# Patient Record
Sex: FEMALE | Race: WHITE | NOT HISPANIC OR LATINO | Employment: OTHER | RURAL
[De-identification: names, ages, dates, MRNs, and addresses within clinical notes are randomized per-mention and may not be internally consistent; named-entity substitution may affect disease eponyms.]

---

## 2017-09-20 ENCOUNTER — HISTORICAL (OUTPATIENT)
Dept: ADMINISTRATIVE | Facility: HOSPITAL | Age: 62
End: 2017-09-20

## 2017-09-22 LAB
LAB AP COMMENTS: NORMAL
LAB AP GENERAL CAT - HISTORICAL: NORMAL
LAB AP INTERPRETATION/RESULT - HISTORICAL: NEGATIVE
LAB AP SPECIMEN ADEQUACY - HISTORICAL: NORMAL
LAB AP SPECIMEN SUBMITTED - HISTORICAL: NORMAL

## 2020-02-19 ENCOUNTER — HISTORICAL (OUTPATIENT)
Dept: ADMINISTRATIVE | Facility: HOSPITAL | Age: 65
End: 2020-02-19

## 2020-02-21 LAB
LAB AP GENERAL CAT - HISTORICAL: NORMAL
LAB AP INTERPRETATION/RESULT - HISTORICAL: NEGATIVE
LAB AP SPECIMEN ADEQUACY - HISTORICAL: NORMAL
LAB AP SPECIMEN SUBMITTED - HISTORICAL: NORMAL

## 2020-05-13 ENCOUNTER — HISTORICAL (OUTPATIENT)
Dept: ADMINISTRATIVE | Facility: HOSPITAL | Age: 65
End: 2020-05-13

## 2020-05-19 ENCOUNTER — HISTORICAL (OUTPATIENT)
Dept: ADMINISTRATIVE | Facility: HOSPITAL | Age: 65
End: 2020-05-19

## 2020-05-19 LAB
BILIRUB UR QL STRIP: NEGATIVE MG/DL
BUN SERPL-MCNC: 12 MG/DL (ref 7–18)
CALCIUM SERPL-MCNC: 7.9 MG/DL (ref 8.5–10.1)
CHLORIDE SERPL-SCNC: 104 MMOL/L (ref 98–107)
CLARITY UR: CLEAR
CO2 SERPL-SCNC: 26 MMOL/L (ref 21–32)
COLOR UR: ABNORMAL
CREAT SERPL-MCNC: 0.83 MG/DL (ref 0.55–1.02)
GLUCOSE SERPL-MCNC: 146 MG/DL (ref 74–106)
GLUCOSE UR STRIP-MCNC: NEGATIVE MG/DL
HCT VFR BLD AUTO: 30.5 % (ref 38–47)
HGB BLD-MCNC: 9.8 G/DL (ref 12–16)
KETONES UR STRIP-SCNC: NEGATIVE MG/DL
LEUKOCYTE ESTERASE UR QL STRIP: NEGATIVE LEU/UL
NITRITE UR QL STRIP: NEGATIVE
PH UR STRIP: 6 PH UNITS (ref 5–8)
POTASSIUM SERPL-SCNC: 3.4 MMOL/L (ref 3.5–5.1)
PROT UR QL STRIP: NEGATIVE MG/DL
RBC # UR STRIP: NEGATIVE ERY/UL
SODIUM SERPL-SCNC: 140 MMOL/L (ref 136–145)
SP GR UR STRIP: 1.02 (ref 1–1.03)
UROBILINOGEN UR STRIP-ACNC: 0.2 EU/DL

## 2020-05-20 ENCOUNTER — HISTORICAL (OUTPATIENT)
Dept: ADMINISTRATIVE | Facility: HOSPITAL | Age: 65
End: 2020-05-20

## 2020-05-20 LAB
BASOPHILS # BLD AUTO: 0.01 X10E3/UL (ref 0–0.2)
BASOPHILS NFR BLD AUTO: 0.1 % (ref 0–1)
BUN SERPL-MCNC: 12 MG/DL (ref 7–18)
CALCIUM SERPL-MCNC: 8 MG/DL (ref 8.5–10.1)
CHLORIDE SERPL-SCNC: 103 MMOL/L (ref 98–107)
CO2 SERPL-SCNC: 28 MMOL/L (ref 21–32)
CREAT SERPL-MCNC: 0.98 MG/DL (ref 0.55–1.02)
EOSINOPHIL # BLD AUTO: 0 X10E3/UL (ref 0–0.5)
EOSINOPHIL NFR BLD AUTO: 0 % (ref 1–4)
ERYTHROCYTE [DISTWIDTH] IN BLOOD BY AUTOMATED COUNT: 12.6 % (ref 11.5–14.5)
GLUCOSE SERPL-MCNC: 154 MG/DL (ref 74–106)
HCT VFR BLD AUTO: 30.8 % (ref 38–47)
HGB BLD-MCNC: 9.4 G/DL (ref 12–16)
HYPOCHROMIA BLD QL SMEAR: ABNORMAL
IMM GRANULOCYTES # BLD AUTO: 0.09 X10E3/UL (ref 0–0.04)
IMM GRANULOCYTES NFR BLD: 0.6 % (ref 0–0.4)
LYMPHOCYTES # BLD AUTO: 0.92 X10E3/UL (ref 1–4.8)
LYMPHOCYTES NFR BLD AUTO: 5.9 % (ref 27–41)
LYMPHOCYTES NFR BLD MANUAL: 8 % (ref 27–41)
MCH RBC QN AUTO: 28.7 PG (ref 27–31)
MCHC RBC AUTO-ENTMCNC: 30.5 G/DL (ref 32–36)
MCV RBC AUTO: 94.2 FL (ref 80–96)
MONOCYTES # BLD AUTO: 0.48 X10E3/UL (ref 0–0.8)
MONOCYTES NFR BLD AUTO: 3.1 % (ref 2–6)
MONOCYTES NFR BLD MANUAL: 1 % (ref 2–6)
MPC BLD CALC-MCNC: 11.4 FL (ref 9.4–12.4)
NEUTROPHILS # BLD AUTO: 14.07 X10E3/UL (ref 1.8–7.7)
NEUTROPHILS NFR BLD AUTO: 90.3 % (ref 53–65)
NEUTS BAND NFR BLD MANUAL: 1 % (ref 1–5)
NEUTS SEG NFR BLD MANUAL: 90 % (ref 50–62)
NRBC # BLD AUTO: 0 X10E3/UL (ref 0–0)
NRBC, AUTO (.00): 0 /100 (ref 0–0)
PLATELET # BLD AUTO: 276 X10E3/UL (ref 150–400)
PLATELET MORPHOLOGY: ABNORMAL
POLYCHROMASIA BLD QL SMEAR: ABNORMAL
POTASSIUM SERPL-SCNC: 4.1 MMOL/L (ref 3.5–5.1)
RBC # BLD AUTO: 3.27 X10E6/UL (ref 4.2–5.4)
SODIUM SERPL-SCNC: 139 MMOL/L (ref 136–145)
WBC # BLD AUTO: 15.57 X10E3/UL (ref 4.5–11)

## 2020-12-17 ENCOUNTER — HISTORICAL (OUTPATIENT)
Dept: ADMINISTRATIVE | Facility: HOSPITAL | Age: 65
End: 2020-12-17

## 2021-04-05 ENCOUNTER — TELEPHONE (OUTPATIENT)
Dept: OBSTETRICS AND GYNECOLOGY | Facility: CLINIC | Age: 66
End: 2021-04-05

## 2021-04-13 RX ORDER — LEVOTHYROXINE SODIUM 100 UG/1
100 TABLET ORAL
COMMUNITY

## 2021-04-13 RX ORDER — AZITHROMYCIN 250 MG/1
500 TABLET, FILM COATED ORAL DAILY
COMMUNITY
Start: 2007-08-30 | End: 2021-08-25 | Stop reason: SDUPTHER

## 2021-04-13 RX ORDER — LISINOPRIL 10 MG/1
10 TABLET ORAL DAILY
COMMUNITY
End: 2021-08-25 | Stop reason: CLARIF

## 2021-04-13 RX ORDER — ESTRADIOL 1 MG/1
1 TABLET ORAL DAILY
COMMUNITY
End: 2021-04-14

## 2021-04-13 RX ORDER — METRONIDAZOLE 500 MG/1
500 TABLET ORAL EVERY 12 HOURS
COMMUNITY
Start: 2011-09-20 | End: 2021-08-25 | Stop reason: CLARIF

## 2021-04-13 RX ORDER — OMEPRAZOLE 20 MG/1
20 CAPSULE, DELAYED RELEASE ORAL 2 TIMES DAILY
COMMUNITY
End: 2021-08-25 | Stop reason: CLARIF

## 2021-05-27 ENCOUNTER — OFFICE VISIT (OUTPATIENT)
Dept: OBSTETRICS AND GYNECOLOGY | Facility: CLINIC | Age: 66
End: 2021-05-27
Payer: COMMERCIAL

## 2021-05-27 VITALS
DIASTOLIC BLOOD PRESSURE: 94 MMHG | SYSTOLIC BLOOD PRESSURE: 138 MMHG | HEART RATE: 73 BPM | BODY MASS INDEX: 25.68 KG/M2 | HEIGHT: 69 IN | RESPIRATION RATE: 16 BRPM | WEIGHT: 173.38 LBS | TEMPERATURE: 98 F

## 2021-05-27 DIAGNOSIS — E89.40 SURGICAL MENOPAUSE, ASYMPTOMATIC: ICD-10-CM

## 2021-05-27 DIAGNOSIS — Z01.419 ENCOUNTER FOR ANNUAL ROUTINE GYNECOLOGICAL EXAMINATION: ICD-10-CM

## 2021-05-27 DIAGNOSIS — E03.9 HYPOTHYROIDISM, UNSPECIFIED TYPE: Primary | ICD-10-CM

## 2021-05-27 DIAGNOSIS — K51.00 ULCERATIVE PANCOLITIS WITHOUT COMPLICATION: ICD-10-CM

## 2021-05-27 PROCEDURE — 99396 PREV VISIT EST AGE 40-64: CPT | Mod: ,,, | Performed by: OBSTETRICS & GYNECOLOGY

## 2021-05-27 PROCEDURE — 82270 PR BLOOD OCCULT, BY PEROX, FECES, SINGLE, COLORECT SCRN: ICD-10-PCS | Mod: ,,, | Performed by: OBSTETRICS & GYNECOLOGY

## 2021-05-27 PROCEDURE — 3008F BODY MASS INDEX DOCD: CPT | Mod: ,,, | Performed by: OBSTETRICS & GYNECOLOGY

## 2021-05-27 PROCEDURE — 88142 CYTOPATH C/V THIN LAYER: CPT | Mod: GCY | Performed by: OBSTETRICS & GYNECOLOGY

## 2021-05-27 PROCEDURE — 99396 PR PREVENTIVE VISIT,EST,40-64: ICD-10-PCS | Mod: ,,, | Performed by: OBSTETRICS & GYNECOLOGY

## 2021-05-27 PROCEDURE — 3008F PR BODY MASS INDEX (BMI) DOCUMENTED: ICD-10-PCS | Mod: ,,, | Performed by: OBSTETRICS & GYNECOLOGY

## 2021-05-27 PROCEDURE — 82270 OCCULT BLOOD FECES: CPT | Mod: ,,, | Performed by: OBSTETRICS & GYNECOLOGY

## 2021-05-27 RX ORDER — ROSUVASTATIN CALCIUM 10 MG/1
TABLET, COATED ORAL
COMMUNITY
Start: 2021-02-23

## 2021-05-27 RX ORDER — ESTRADIOL 1 MG/1
1 TABLET ORAL DAILY
Qty: 90 TABLET | Refills: 3 | Status: SHIPPED | OUTPATIENT
Start: 2021-05-27 | End: 2022-10-12

## 2021-05-27 RX ORDER — LISINOPRIL 5 MG/1
5 TABLET ORAL DAILY
COMMUNITY
Start: 2021-03-11

## 2021-05-28 LAB
GH SERPL-MCNC: NORMAL NG/ML
INSULIN SERPL-ACNC: NORMAL U[IU]/ML
LAB AP CLINICAL INFORMATION: NORMAL
LAB AP GYN INTERPRETATION: NEGATIVE
LAB AP PAP DISCLAIMER COMMENTS: NORMAL
RENIN PLAS-CCNC: NORMAL NG/ML/H

## 2021-07-01 ENCOUNTER — PATIENT MESSAGE (OUTPATIENT)
Dept: ADMINISTRATIVE | Facility: OTHER | Age: 66
End: 2021-07-01

## 2021-07-19 ENCOUNTER — HOSPITAL ENCOUNTER (OUTPATIENT)
Dept: RADIOLOGY | Facility: HOSPITAL | Age: 66
Discharge: HOME | End: 2021-07-19
Attending: ORTHOPAEDIC SURGERY
Payer: COMMERCIAL

## 2021-07-19 DIAGNOSIS — Z47.89 ORTHOPEDIC AFTERCARE: ICD-10-CM

## 2021-07-19 PROBLEM — M17.11 PRIMARY OSTEOARTHRITIS OF RIGHT KNEE: Status: ACTIVE | Noted: 2021-07-19

## 2021-07-19 PROCEDURE — 73562 X-RAY EXAM OF KNEE 3: CPT | Mod: TC,50

## 2021-08-23 RX ORDER — MESALAMINE 4 G/60ML
SUSPENSION RECTAL
COMMUNITY
End: 2021-08-25 | Stop reason: CLARIF

## 2021-08-23 RX ORDER — MESALAMINE 800 MG/1
TABLET, DELAYED RELEASE ORAL
COMMUNITY
End: 2021-08-25 | Stop reason: CLARIF

## 2021-08-23 RX ORDER — ALBUTEROL SULFATE 90 UG/1
AEROSOL, METERED RESPIRATORY (INHALATION)
COMMUNITY
Start: 2021-07-07 | End: 2021-08-23

## 2021-08-23 RX ORDER — MELOXICAM 15 MG/1
TABLET ORAL
COMMUNITY
End: 2021-08-25 | Stop reason: CLARIF

## 2021-08-23 RX ORDER — HYDROCODONE BITARTRATE AND ACETAMINOPHEN 7.5; 325 MG/1; MG/1
TABLET ORAL
COMMUNITY
Start: 2021-08-02 | End: 2021-08-25 | Stop reason: CLARIF

## 2021-08-23 RX ORDER — HYDROCORTISONE 100 MG/60ML
SUSPENSION RECTAL
COMMUNITY
End: 2021-08-25 | Stop reason: CLARIF

## 2021-08-23 RX ORDER — TRAMADOL HYDROCHLORIDE 50 MG/1
50 TABLET ORAL 2 TIMES DAILY PRN
COMMUNITY
Start: 2021-07-01 | End: 2021-08-25 | Stop reason: CLARIF

## 2021-08-23 RX ORDER — TRAMADOL HYDROCHLORIDE 50 MG/1
TABLET ORAL
COMMUNITY
End: 2021-08-23

## 2021-08-23 RX ORDER — OMEPRAZOLE 40 MG/1
40 CAPSULE, DELAYED RELEASE ORAL 2 TIMES DAILY
COMMUNITY
Start: 2021-07-01

## 2021-08-23 RX ORDER — OMEPRAZOLE 40 MG/1
CAPSULE, DELAYED RELEASE ORAL
COMMUNITY
End: 2021-08-23

## 2021-08-23 RX ORDER — LISINOPRIL 10 MG/1
TABLET ORAL
COMMUNITY
End: 2021-08-23

## 2021-08-23 RX ORDER — ALBUTEROL SULFATE 90 UG/1
AEROSOL, METERED RESPIRATORY (INHALATION)
COMMUNITY
End: 2021-08-25 | Stop reason: CLARIF

## 2021-08-23 RX ORDER — LEVOTHYROXINE SODIUM 100 UG/1
TABLET ORAL
COMMUNITY
End: 2021-08-23

## 2021-08-23 RX ORDER — CEPHALEXIN 250 MG/1
250 CAPSULE ORAL 2 TIMES DAILY
COMMUNITY
Start: 2021-08-02 | End: 2021-08-25 | Stop reason: CLARIF

## 2021-08-31 ENCOUNTER — ANESTHESIA (OUTPATIENT)
Dept: SURGERY | Facility: HOSPITAL | Age: 66
End: 2021-08-31
Payer: COMMERCIAL

## 2021-08-31 ENCOUNTER — ANESTHESIA EVENT (OUTPATIENT)
Dept: SURGERY | Facility: HOSPITAL | Age: 66
End: 2021-08-31
Payer: COMMERCIAL

## 2021-08-31 ENCOUNTER — HOSPITAL ENCOUNTER (OUTPATIENT)
Facility: HOSPITAL | Age: 66
Discharge: HOME | End: 2021-08-31
Attending: ORTHOPAEDIC SURGERY | Admitting: ORTHOPAEDIC SURGERY
Payer: COMMERCIAL

## 2021-08-31 VITALS
SYSTOLIC BLOOD PRESSURE: 136 MMHG | OXYGEN SATURATION: 96 % | HEART RATE: 75 BPM | RESPIRATION RATE: 16 BRPM | WEIGHT: 164 LBS | DIASTOLIC BLOOD PRESSURE: 80 MMHG | BODY MASS INDEX: 24.22 KG/M2 | TEMPERATURE: 98 F

## 2021-08-31 DIAGNOSIS — M17.11 PRIMARY OSTEOARTHRITIS OF RIGHT KNEE: Primary | ICD-10-CM

## 2021-08-31 LAB
ANION GAP SERPL CALCULATED.3IONS-SCNC: 12 MMOL/L (ref 7–16)
BUN SERPL-MCNC: 8 MG/DL (ref 7–18)
BUN/CREAT SERPL: 11 (ref 6–20)
CALCIUM SERPL-MCNC: 7.9 MG/DL (ref 8.5–10.1)
CHLORIDE SERPL-SCNC: 110 MMOL/L (ref 98–107)
CO2 SERPL-SCNC: 26 MMOL/L (ref 21–32)
CREAT SERPL-MCNC: 0.73 MG/DL (ref 0.55–1.02)
GLUCOSE SERPL-MCNC: 120 MG/DL (ref 74–106)
HCT VFR BLD AUTO: 30.9 % (ref 38–47)
HGB BLD-MCNC: 10 G/DL (ref 12–16)
POTASSIUM SERPL-SCNC: 4.3 MMOL/L (ref 3.5–5.1)
SODIUM SERPL-SCNC: 144 MMOL/L (ref 136–145)

## 2021-08-31 PROCEDURE — 25000003 PHARM REV CODE 250: Performed by: ANESTHESIOLOGY

## 2021-08-31 PROCEDURE — 36000712 HC OR TIME LEV V 1ST 15 MIN: Performed by: ORTHOPAEDIC SURGERY

## 2021-08-31 PROCEDURE — 97165 OT EVAL LOW COMPLEX 30 MIN: CPT

## 2021-08-31 PROCEDURE — 37000008 HC ANESTHESIA 1ST 15 MINUTES: Performed by: ORTHOPAEDIC SURGERY

## 2021-08-31 PROCEDURE — 37000009 HC ANESTHESIA EA ADD 15 MINS: Performed by: ORTHOPAEDIC SURGERY

## 2021-08-31 PROCEDURE — 27000510 HC BLANKET BAIR HUGGER ANY SIZE: Performed by: ANESTHESIOLOGY

## 2021-08-31 PROCEDURE — 36415 COLL VENOUS BLD VENIPUNCTURE: CPT | Performed by: ORTHOPAEDIC SURGERY

## 2021-08-31 PROCEDURE — 71000033 HC RECOVERY, INTIAL HOUR: Performed by: ORTHOPAEDIC SURGERY

## 2021-08-31 PROCEDURE — 71000015 HC POSTOP RECOV 1ST HR: Performed by: ORTHOPAEDIC SURGERY

## 2021-08-31 PROCEDURE — 25000003 PHARM REV CODE 250: Performed by: ORTHOPAEDIC SURGERY

## 2021-08-31 PROCEDURE — 80048 BASIC METABOLIC PNL TOTAL CA: CPT | Performed by: ORTHOPAEDIC SURGERY

## 2021-08-31 PROCEDURE — S0020 INJECTION, BUPIVICAINE HYDRO: HCPCS | Performed by: ANESTHESIOLOGY

## 2021-08-31 PROCEDURE — C1713 ANCHOR/SCREW BN/BN,TIS/BN: HCPCS | Performed by: ORTHOPAEDIC SURGERY

## 2021-08-31 PROCEDURE — 97161 PT EVAL LOW COMPLEX 20 MIN: CPT

## 2021-08-31 PROCEDURE — 71000016 HC POSTOP RECOV ADDL HR: Performed by: ORTHOPAEDIC SURGERY

## 2021-08-31 PROCEDURE — 27100168 OPTIME MED/SURG SUP & DEVICES NON-STERILE SUPPLY: Performed by: ORTHOPAEDIC SURGERY

## 2021-08-31 PROCEDURE — 64447 PR NERVE BLOCK INJ, ANES/STEROID, FEMORAL, INCL IMAG GUIDANCE: ICD-10-PCS | Mod: XU,RT,ICN, | Performed by: ANESTHESIOLOGY

## 2021-08-31 PROCEDURE — C1776 JOINT DEVICE (IMPLANTABLE): HCPCS | Performed by: ORTHOPAEDIC SURGERY

## 2021-08-31 PROCEDURE — 27000716 HC OXISENSOR PROBE, ANY SIZE: Performed by: ANESTHESIOLOGY

## 2021-08-31 PROCEDURE — C1769 GUIDE WIRE: HCPCS | Performed by: ORTHOPAEDIC SURGERY

## 2021-08-31 PROCEDURE — 27201423 OPTIME MED/SURG SUP & DEVICES STERILE SUPPLY: Performed by: ORTHOPAEDIC SURGERY

## 2021-08-31 PROCEDURE — 64447 NJX AA&/STRD FEMORAL NRV IMG: CPT | Mod: XU,RT,ICN, | Performed by: ANESTHESIOLOGY

## 2021-08-31 PROCEDURE — 63600175 PHARM REV CODE 636 W HCPCS: Performed by: ANESTHESIOLOGY

## 2021-08-31 PROCEDURE — D9220A PRA ANESTHESIA: Mod: ICN,,, | Performed by: ANESTHESIOLOGY

## 2021-08-31 PROCEDURE — 27000655: Performed by: ANESTHESIOLOGY

## 2021-08-31 PROCEDURE — 27000177 HC AIRWAY, LARYNGEAL MASK: Performed by: ANESTHESIOLOGY

## 2021-08-31 PROCEDURE — 85018 HEMOGLOBIN: CPT | Performed by: ORTHOPAEDIC SURGERY

## 2021-08-31 PROCEDURE — 85014 HEMATOCRIT: CPT | Performed by: ORTHOPAEDIC SURGERY

## 2021-08-31 PROCEDURE — 25000003 PHARM REV CODE 250: Performed by: NURSE ANESTHETIST, CERTIFIED REGISTERED

## 2021-08-31 PROCEDURE — D9220A PRA ANESTHESIA: ICD-10-PCS | Mod: ICN,,, | Performed by: ANESTHESIOLOGY

## 2021-08-31 PROCEDURE — 27201960 HC SPINAL TRAY: Performed by: ANESTHESIOLOGY

## 2021-08-31 PROCEDURE — 63600175 PHARM REV CODE 636 W HCPCS: Performed by: NURSE ANESTHETIST, CERTIFIED REGISTERED

## 2021-08-31 PROCEDURE — 36000713 HC OR TIME LEV V EA ADD 15 MIN: Performed by: ORTHOPAEDIC SURGERY

## 2021-08-31 DEVICE — IMP COMP TIB TRIATHLON SZ4: Type: IMPLANTABLE DEVICE | Site: KNEE | Status: FUNCTIONAL

## 2021-08-31 DEVICE — CEMENT BONE SMARTSET HV 40G: Type: IMPLANTABLE DEVICE | Site: KNEE | Status: FUNCTIONAL

## 2021-08-31 DEVICE — IMPLANTABLE DEVICE: Type: IMPLANTABLE DEVICE | Site: KNEE | Status: FUNCTIONAL

## 2021-08-31 DEVICE — IMP INSERT TIBIAL BEARING 4X9MM: Type: IMPLANTABLE DEVICE | Site: KNEE | Status: FUNCTIONAL

## 2021-08-31 RX ORDER — OXYCODONE HYDROCHLORIDE 5 MG/1
5 TABLET ORAL
Status: DISCONTINUED | OUTPATIENT
Start: 2021-08-31 | End: 2021-08-31 | Stop reason: HOSPADM

## 2021-08-31 RX ORDER — CEFAZOLIN SODIUM 2 G/50ML
2 SOLUTION INTRAVENOUS
Status: DISCONTINUED | OUTPATIENT
Start: 2021-08-31 | End: 2021-08-31 | Stop reason: HOSPADM

## 2021-08-31 RX ORDER — KETOROLAC TROMETHAMINE 30 MG/ML
INJECTION, SOLUTION INTRAMUSCULAR; INTRAVENOUS
Status: DISCONTINUED | OUTPATIENT
Start: 2021-08-31 | End: 2021-08-31

## 2021-08-31 RX ORDER — FENTANYL CITRATE 50 UG/ML
INJECTION, SOLUTION INTRAMUSCULAR; INTRAVENOUS
Status: DISCONTINUED | OUTPATIENT
Start: 2021-08-31 | End: 2021-08-31

## 2021-08-31 RX ORDER — LIDOCAINE HYDROCHLORIDE 20 MG/ML
INJECTION INTRAVENOUS
Status: DISCONTINUED | OUTPATIENT
Start: 2021-08-31 | End: 2021-08-31

## 2021-08-31 RX ORDER — EPHEDRINE SULFATE 50 MG/ML
INJECTION, SOLUTION INTRAVENOUS
Status: DISCONTINUED | OUTPATIENT
Start: 2021-08-31 | End: 2021-08-31

## 2021-08-31 RX ORDER — CEFAZOLIN SODIUM 1 G/3ML
INJECTION, POWDER, FOR SOLUTION INTRAMUSCULAR; INTRAVENOUS
Status: DISCONTINUED | OUTPATIENT
Start: 2021-08-31 | End: 2021-08-31

## 2021-08-31 RX ORDER — ONDANSETRON 2 MG/ML
4 INJECTION INTRAMUSCULAR; INTRAVENOUS DAILY PRN
Status: DISCONTINUED | OUTPATIENT
Start: 2021-08-31 | End: 2021-08-31 | Stop reason: HOSPADM

## 2021-08-31 RX ORDER — MORPHINE SULFATE 10 MG/ML
4 INJECTION INTRAMUSCULAR; INTRAVENOUS; SUBCUTANEOUS EVERY 5 MIN PRN
Status: DISCONTINUED | OUTPATIENT
Start: 2021-08-31 | End: 2021-08-31 | Stop reason: HOSPADM

## 2021-08-31 RX ORDER — TRANEXAMIC ACID 100 MG/ML
INJECTION, SOLUTION INTRAVENOUS
Status: DISCONTINUED | OUTPATIENT
Start: 2021-08-31 | End: 2021-08-31

## 2021-08-31 RX ORDER — HYDROCODONE BITARTRATE AND ACETAMINOPHEN 10; 325 MG/1; MG/1
1 TABLET ORAL EVERY 4 HOURS PRN
Qty: 30 TABLET | Refills: 0 | Status: SHIPPED | OUTPATIENT
Start: 2021-08-31 | End: 2021-09-08 | Stop reason: DRUGHIGH

## 2021-08-31 RX ORDER — SODIUM CHLORIDE 9 MG/ML
INJECTION, SOLUTION INTRAVENOUS CONTINUOUS
Status: DISCONTINUED | OUTPATIENT
Start: 2021-08-31 | End: 2021-08-31 | Stop reason: HOSPADM

## 2021-08-31 RX ORDER — SCOLOPAMINE TRANSDERMAL SYSTEM 1 MG/1
1 PATCH, EXTENDED RELEASE TRANSDERMAL
Status: DISCONTINUED | OUTPATIENT
Start: 2021-08-31 | End: 2021-08-31 | Stop reason: HOSPADM

## 2021-08-31 RX ORDER — HYDROMORPHONE HYDROCHLORIDE 2 MG/ML
0.5 INJECTION, SOLUTION INTRAMUSCULAR; INTRAVENOUS; SUBCUTANEOUS EVERY 5 MIN PRN
Status: DISCONTINUED | OUTPATIENT
Start: 2021-08-31 | End: 2021-08-31 | Stop reason: HOSPADM

## 2021-08-31 RX ORDER — MEPERIDINE HYDROCHLORIDE 25 MG/ML
25 INJECTION INTRAMUSCULAR; INTRAVENOUS; SUBCUTANEOUS EVERY 10 MIN PRN
Status: DISCONTINUED | OUTPATIENT
Start: 2021-08-31 | End: 2021-08-31 | Stop reason: HOSPADM

## 2021-08-31 RX ORDER — DIPHENHYDRAMINE HYDROCHLORIDE 50 MG/ML
25 INJECTION INTRAMUSCULAR; INTRAVENOUS EVERY 6 HOURS PRN
Status: DISCONTINUED | OUTPATIENT
Start: 2021-08-31 | End: 2021-08-31 | Stop reason: HOSPADM

## 2021-08-31 RX ORDER — MIDAZOLAM HYDROCHLORIDE 1 MG/ML
INJECTION INTRAMUSCULAR; INTRAVENOUS
Status: DISCONTINUED | OUTPATIENT
Start: 2021-08-31 | End: 2021-08-31

## 2021-08-31 RX ORDER — PROPOFOL 10 MG/ML
VIAL (ML) INTRAVENOUS
Status: DISCONTINUED | OUTPATIENT
Start: 2021-08-31 | End: 2021-08-31

## 2021-08-31 RX ADMIN — EPHEDRINE SULFATE 10 MG: 50 INJECTION INTRAVENOUS at 09:08

## 2021-08-31 RX ADMIN — SODIUM CHLORIDE: 9 INJECTION, SOLUTION INTRAVENOUS at 06:08

## 2021-08-31 RX ADMIN — FENTANYL CITRATE 50 MCG: 50 INJECTION INTRAMUSCULAR; INTRAVENOUS at 08:08

## 2021-08-31 RX ADMIN — PROPOFOL 50 MG: 10 INJECTION, EMULSION INTRAVENOUS at 08:08

## 2021-08-31 RX ADMIN — EPHEDRINE SULFATE 10 MG: 50 INJECTION INTRAVENOUS at 08:08

## 2021-08-31 RX ADMIN — BUPIVACAINE HYDROCHLORIDE 1.4 ML: 7.5 INJECTION, SOLUTION EPIDURAL; RETROBULBAR at 08:08

## 2021-08-31 RX ADMIN — MIDAZOLAM HYDROCHLORIDE 2 MG: 1 INJECTION, SOLUTION INTRAMUSCULAR; INTRAVENOUS at 08:08

## 2021-08-31 RX ADMIN — ROPIVACAINE HYDROCHLORIDE 20 ML: 7.5 INJECTION, SOLUTION EPIDURAL; PERINEURAL at 08:08

## 2021-08-31 RX ADMIN — CEFAZOLIN 2 G: 1 INJECTION, POWDER, FOR SOLUTION INTRAMUSCULAR; INTRAVENOUS; PARENTERAL at 08:08

## 2021-08-31 RX ADMIN — DEXAMETHASONE SODIUM PHOSPHATE 10 MG: 10 INJECTION, SOLUTION INTRAMUSCULAR; INTRAVENOUS at 08:08

## 2021-08-31 RX ADMIN — SODIUM CHLORIDE: 9 INJECTION, SOLUTION INTRAVENOUS at 08:08

## 2021-08-31 RX ADMIN — PROPOFOL 125 MG: 10 INJECTION, EMULSION INTRAVENOUS at 08:08

## 2021-08-31 RX ADMIN — SCOPALAMINE 1 PATCH: 1 PATCH, EXTENDED RELEASE TRANSDERMAL at 07:08

## 2021-08-31 RX ADMIN — TRANEXAMIC ACID 1000 MG: 100 INJECTION, SOLUTION INTRAVENOUS at 08:08

## 2021-08-31 RX ADMIN — TRANEXAMIC ACID 1000 MG: 100 INJECTION, SOLUTION INTRAVENOUS at 09:08

## 2021-08-31 RX ADMIN — LIDOCAINE HYDROCHLORIDE 50 MG: 20 INJECTION, SOLUTION INTRAVENOUS at 08:08

## 2021-08-31 RX ADMIN — KETOROLAC TROMETHAMINE 30 MG: 30 INJECTION, SOLUTION INTRAMUSCULAR at 09:08

## 2021-08-31 RX ADMIN — VANCOMYCIN HYDROCHLORIDE 1000 MG: 1 INJECTION, POWDER, LYOPHILIZED, FOR SOLUTION INTRAVENOUS at 08:08

## 2021-09-03 RX ORDER — ROPIVACAINE HYDROCHLORIDE 7.5 MG/ML
INJECTION, SOLUTION EPIDURAL; PERINEURAL
Status: DISCONTINUED | OUTPATIENT
Start: 2021-08-31 | End: 2021-09-03

## 2021-09-03 RX ORDER — DEXAMETHASONE SODIUM PHOSPHATE 10 MG/ML
INJECTION INTRAMUSCULAR; INTRAVENOUS
Status: DISCONTINUED | OUTPATIENT
Start: 2021-08-31 | End: 2021-09-03

## 2021-09-03 RX ORDER — BUPIVACAINE HYDROCHLORIDE 7.5 MG/ML
INJECTION, SOLUTION EPIDURAL; RETROBULBAR
Status: DISCONTINUED | OUTPATIENT
Start: 2021-08-31 | End: 2021-09-03

## 2021-09-08 DIAGNOSIS — Z96.651 STATUS POST TOTAL RIGHT KNEE REPLACEMENT: Primary | ICD-10-CM

## 2021-09-08 RX ORDER — HYDROCODONE BITARTRATE AND ACETAMINOPHEN 10; 325 MG/1; MG/1
1 TABLET ORAL EVERY 6 HOURS PRN
Qty: 24 TABLET | Refills: 0 | Status: SHIPPED | OUTPATIENT
Start: 2021-09-08 | End: 2021-09-15

## 2021-09-15 PROBLEM — Z09 POSTOP CHECK: Status: ACTIVE | Noted: 2021-09-15

## 2021-10-13 ENCOUNTER — HOSPITAL ENCOUNTER (OUTPATIENT)
Dept: RADIOLOGY | Facility: HOSPITAL | Age: 66
Discharge: HOME | End: 2021-10-13
Attending: ORTHOPAEDIC SURGERY
Payer: COMMERCIAL

## 2021-10-13 DIAGNOSIS — Z47.89 ORTHOPEDIC AFTERCARE: ICD-10-CM

## 2021-10-13 PROBLEM — Z96.651 HISTORY OF TOTAL KNEE ARTHROPLASTY, RIGHT: Status: ACTIVE | Noted: 2021-10-13

## 2021-10-13 PROCEDURE — 73562 X-RAY EXAM OF KNEE 3: CPT | Mod: TC,RT

## 2021-12-03 ENCOUNTER — HOSPITAL ENCOUNTER (OUTPATIENT)
Dept: RADIOLOGY | Facility: HOSPITAL | Age: 66
Discharge: HOME | End: 2021-12-03
Attending: ORTHOPAEDIC SURGERY
Payer: MEDICARE

## 2021-12-03 DIAGNOSIS — Z47.89 ORTHOPEDIC AFTERCARE: ICD-10-CM

## 2021-12-03 PROCEDURE — 73562 X-RAY EXAM OF KNEE 3: CPT | Mod: TC,RT

## 2021-12-20 PROBLEM — Z09 POSTOP CHECK: Status: RESOLVED | Noted: 2021-09-15 | Resolved: 2021-12-20

## 2021-12-21 ENCOUNTER — OFFICE VISIT (OUTPATIENT)
Dept: GASTROENTEROLOGY | Facility: CLINIC | Age: 66
End: 2021-12-21
Payer: MEDICARE

## 2021-12-21 VITALS
HEART RATE: 85 BPM | HEIGHT: 68 IN | BODY MASS INDEX: 24.25 KG/M2 | SYSTOLIC BLOOD PRESSURE: 134 MMHG | DIASTOLIC BLOOD PRESSURE: 76 MMHG | WEIGHT: 160 LBS | RESPIRATION RATE: 14 BRPM | OXYGEN SATURATION: 99 %

## 2021-12-21 DIAGNOSIS — K92.1 BLOOD IN STOOL: ICD-10-CM

## 2021-12-21 DIAGNOSIS — K51.311 ULCERATIVE RECTOSIGMOIDITIS WITH RECTAL BLEEDING: Primary | ICD-10-CM

## 2021-12-21 DIAGNOSIS — D64.9 ANEMIA, UNSPECIFIED TYPE: ICD-10-CM

## 2021-12-21 DIAGNOSIS — R19.7 DIARRHEA, UNSPECIFIED TYPE: ICD-10-CM

## 2021-12-21 LAB
C COLI+JEJ+UPSA DNA STL QL NAA+NON-PROBE: NEGATIVE
E COLI SXT1 STL QL IA: NEGATIVE
E COLI SXT2 STL QL IA: NEGATIVE
NOROVIRUS GI+II RNA STL QL NAA+NON-PROBE: NEGATIVE
RVA RNA STL QL NAA+NON-PROBE: NEGATIVE
S ENT+BONG DNA STL QL NAA+NON-PROBE: NEGATIVE
SHIGELLA SPECIES NAT: NEGATIVE
V CHOL+PARA+VUL DNA STL QL NAA+NON-PROBE: NEGATIVE
Y ENTEROCOL DNA STL QL NAA+NON-PROBE: NEGATIVE

## 2021-12-21 PROCEDURE — 87506 ENTERIC PATHOGEN PANEL: ICD-10-PCS | Mod: ,,, | Performed by: CLINICAL MEDICAL LABORATORY

## 2021-12-21 PROCEDURE — 99204 OFFICE O/P NEW MOD 45 MIN: CPT | Mod: ,,, | Performed by: NURSE PRACTITIONER

## 2021-12-21 PROCEDURE — 83993 CALPROTECTIN, STOOL: ICD-10-PCS | Mod: 90,,, | Performed by: CLINICAL MEDICAL LABORATORY

## 2021-12-21 PROCEDURE — 87493 C DIFF AMPLIFIED PROBE: CPT | Mod: 59,,, | Performed by: CLINICAL MEDICAL LABORATORY

## 2021-12-21 PROCEDURE — 85610 PROTHROMBIN TIME: CPT | Performed by: NURSE PRACTITIONER

## 2021-12-21 PROCEDURE — 87506 IADNA-DNA/RNA PROBE TQ 6-11: CPT | Mod: ,,, | Performed by: CLINICAL MEDICAL LABORATORY

## 2021-12-21 PROCEDURE — 99204 PR OFFICE/OUTPT VISIT, NEW, LEVL IV, 45-59 MIN: ICD-10-PCS | Mod: ,,, | Performed by: NURSE PRACTITIONER

## 2021-12-21 PROCEDURE — 87493 C DIFF TOXIN BY PCR: ICD-10-PCS | Mod: 59,,, | Performed by: CLINICAL MEDICAL LABORATORY

## 2021-12-21 PROCEDURE — 83993 ASSAY FOR CALPROTECTIN FECAL: CPT | Mod: 90,,, | Performed by: CLINICAL MEDICAL LABORATORY

## 2021-12-22 LAB — C DIFF TOX A+B STL IA-ACNC: NEGATIVE

## 2021-12-24 LAB — CALPROTECTIN STL-MCNT: 1543 MCG/G

## 2022-01-05 ENCOUNTER — TELEPHONE (OUTPATIENT)
Dept: GASTROENTEROLOGY | Facility: CLINIC | Age: 67
End: 2022-01-05
Payer: MEDICARE

## 2022-01-05 ENCOUNTER — PATIENT MESSAGE (OUTPATIENT)
Dept: GASTROENTEROLOGY | Facility: CLINIC | Age: 67
End: 2022-01-05
Payer: MEDICARE

## 2022-01-05 NOTE — TELEPHONE ENCOUNTER
After talking with AB, will call in steriods for pt considering symptoms, will call in to pharmacy. [facility] at [facility] working on getting zeposia approved. Pt notified and voiced understanding.

## 2022-02-23 ENCOUNTER — TELEPHONE (OUTPATIENT)
Dept: GASTROENTEROLOGY | Facility: CLINIC | Age: 67
End: 2022-02-23
Payer: MEDICARE

## 2022-02-23 DIAGNOSIS — K51.819 OTHER ULCERATIVE COLITIS WITH COMPLICATION: Primary | ICD-10-CM

## 2022-02-23 NOTE — TELEPHONE ENCOUNTER
Pt calls and states zepozia is getting delivered any day now. ekg is ordered and pt can go any day from 9am-3pm. Pt voiced understanding.

## 2022-02-25 ENCOUNTER — HOSPITAL ENCOUNTER (OUTPATIENT)
Dept: CARDIOLOGY | Facility: HOSPITAL | Age: 67
Discharge: HOME | End: 2022-02-25
Attending: NURSE PRACTITIONER
Payer: MEDICARE

## 2022-02-25 DIAGNOSIS — K51.819 OTHER ULCERATIVE COLITIS WITH COMPLICATION: ICD-10-CM

## 2022-02-25 PROCEDURE — 93005 ELECTROCARDIOGRAM TRACING: CPT

## 2022-02-25 PROCEDURE — 93010 ELECTROCARDIOGRAM REPORT: CPT | Mod: ,,, | Performed by: INTERNAL MEDICINE

## 2022-02-25 PROCEDURE — 93010 EKG 12-LEAD: ICD-10-PCS | Mod: ,,, | Performed by: INTERNAL MEDICINE

## 2022-03-03 ENCOUNTER — HOSPITAL ENCOUNTER (OUTPATIENT)
Dept: RADIOLOGY | Facility: HOSPITAL | Age: 67
Discharge: HOME | End: 2022-03-03
Attending: NURSE PRACTITIONER
Payer: MEDICARE

## 2022-03-03 DIAGNOSIS — Z96.651 STATUS POST TOTAL RIGHT KNEE REPLACEMENT: ICD-10-CM

## 2022-03-03 PROCEDURE — 73562 X-RAY EXAM OF KNEE 3: CPT | Mod: TC,RT

## 2022-03-03 PROCEDURE — 73562 XR KNEE AP LAT WITH SUNRISE RIGHT: ICD-10-PCS | Mod: 26,RT,, | Performed by: STUDENT IN AN ORGANIZED HEALTH CARE EDUCATION/TRAINING PROGRAM

## 2022-03-03 PROCEDURE — 73562 X-RAY EXAM OF KNEE 3: CPT | Mod: 26,RT,, | Performed by: STUDENT IN AN ORGANIZED HEALTH CARE EDUCATION/TRAINING PROGRAM

## 2022-03-21 ENCOUNTER — OFFICE VISIT (OUTPATIENT)
Dept: GASTROENTEROLOGY | Facility: CLINIC | Age: 67
End: 2022-03-21
Payer: MEDICARE

## 2022-03-21 VITALS
HEIGHT: 69 IN | BODY MASS INDEX: 24.44 KG/M2 | SYSTOLIC BLOOD PRESSURE: 154 MMHG | WEIGHT: 165 LBS | RESPIRATION RATE: 14 BRPM | OXYGEN SATURATION: 97 % | HEART RATE: 71 BPM | DIASTOLIC BLOOD PRESSURE: 89 MMHG

## 2022-03-21 DIAGNOSIS — Z86.2 HISTORY OF IRON DEFICIENCY ANEMIA: ICD-10-CM

## 2022-03-21 DIAGNOSIS — K51.311 ULCERATIVE RECTOSIGMOIDITIS WITH RECTAL BLEEDING: Primary | ICD-10-CM

## 2022-03-21 DIAGNOSIS — M25.473 ANKLE SWELLING, UNSPECIFIED LATERALITY: ICD-10-CM

## 2022-03-21 PROCEDURE — 99213 PR OFFICE/OUTPT VISIT, EST, LEVL III, 20-29 MIN: ICD-10-PCS | Mod: ,,, | Performed by: NURSE PRACTITIONER

## 2022-03-21 PROCEDURE — 99213 OFFICE O/P EST LOW 20 MIN: CPT | Mod: ,,, | Performed by: NURSE PRACTITIONER

## 2022-03-21 NOTE — PROGRESS NOTES
Pt here for UC f/u. Doing well after high dose tapering steroids for flare. No further abd pain, rectal bleeding, or diarrhea. She is on week 3 of Zeposia. No cardiac or CVA hx per pt or chart. Had NSR EKG (spoke with heart station, who states cardiologist on call read the EKG when it was done- scanned under card proc tab) & CBC, LFTs prior to tx starting. No macular edema, uveitis, or DM hx. C/o ankle swelling after starting Zeposia but spontaneously resolved within about a week or two. Otherwise, doing great with no symptoms of IBD currently or other side effects.  Previous hx of failed mesalamine NV/enemas & Asacol PO when dx about 3 yrs ago @ [facility].   Has not had colonoscopy since 2019 - full colon 2019 and flex sig 2020 - by Tony (full reports scanned in media tab).    Past Medical History:   Diagnosis Date    Bacterial vaginosis     Cystocele with rectocele 11/28/2007    1st degree cystocele; 2nd degree rectocele    Cystocele, midline 12/12/2007    midline    Depressive disorder 09/2004    mild    Dysmenorrhea 2006    severe first day and getting worse    Esophageal reflux     Granulation tissue 08/12/2008    5 - 8 mm long lower post repair granulation tissue - treated with sliver nitrate cautery    Hypercholesterolemia 09/20/2004    cholesterol is 219 mg/dl    Hyperlipidemia 10/26/2011    mile;  TG  71 mg/dl;  HDL  67 mg/dl;  LDL  136 mg/dl;  total cholesterol  217 mg/dl.  10/13/2017:  LDL  163 mg/dl;  total cholesterol  260 mg/dl; triglycerides  186 mg/dl;  HDL  66 mg/dl    Hypertension     Hypertriglyceridemia 10/04/2010    216 mg/dl;  [id number]    Hypertriglyceridemia 10/04/2010    216  mg/dll  02/01/2013 - recent kevek us 238 mg/dl    IBD (inflammatory bowel disease)     Large uterus     Menorrhagia     Polymenorrhea     PONV (postoperative nausea and vomiting)     Post-hysterectomy menopause     Rectocele     Stress incontinence     Ulcerative colitis 08/2020    Ulcerative  colitis confirmed by colonoscopy by Dr. Ho Capellan    Uterine prolapse     Vaginal discharge        Review of Systems   Constitutional: Negative for chills and fever.   Respiratory: Negative for shortness of breath.    Cardiovascular: Negative for chest pain.   Gastrointestinal: Negative for abdominal pain, blood in stool, constipation, diarrhea, melena, nausea and vomiting.   Skin: Negative for rash.   Neurological: Negative for weakness.       Physical Exam  Vitals reviewed. Exam conducted with a chaperone present.   Constitutional:       General: She is not in acute distress.     Appearance: Normal appearance.   HENT:      Head: Normocephalic.   Eyes:      General: No scleral icterus.     Pupils: Pupils are equal, round, and reactive to light.   Cardiovascular:      Rate and Rhythm: Normal rate.   Pulmonary:      Effort: Pulmonary effort is normal.   Abdominal:      General: There is no distension.      Palpations: Abdomen is soft.      Tenderness: There is no abdominal tenderness. There is no guarding or rebound.   Skin:     General: Skin is warm and dry.   Neurological:      General: No focal deficit present.      Mental Status: She is alert and oriented to person, place, and time. Mental status is at baseline.   Psychiatric:         Mood and Affect: Mood normal.         Behavior: Behavior normal.         Thought Content: Thought content normal.         Judgment: Judgment normal.       Plan  -continue Zeposia as directed  -avoid high tyramine foods  -reports any side effects immediately especially any cardiac, infectious, ophthalmic, or hepatic related symptoms  -recheck CBC/CMP at follow up  -repeat colonoscopy in 6 months for surveillance  -recheck inflammatory markers for comparison (was flaring at last check, now not having symptoms)  -echo for h/o ankle swelling after starting Zeposia  -consider ophthalmology follow up if not already established with one for screenings  -RTC 3 months, sooner PRN

## 2022-03-30 ENCOUNTER — HOSPITAL ENCOUNTER (OUTPATIENT)
Dept: CARDIOLOGY | Facility: HOSPITAL | Age: 67
Discharge: HOME | End: 2022-03-30
Attending: NURSE PRACTITIONER
Payer: MEDICARE

## 2022-03-30 VITALS — BODY MASS INDEX: 24.44 KG/M2 | WEIGHT: 165 LBS | HEIGHT: 69 IN

## 2022-03-30 DIAGNOSIS — M25.473 ANKLE SWELLING, UNSPECIFIED LATERALITY: ICD-10-CM

## 2022-03-30 LAB
AV INDEX (PROSTH): 0.67
AV MEAN GRADIENT: 4 MMHG
AV PEAK GRADIENT: 7 MMHG
AV VALVE AREA: 2.48 CM2
AV VELOCITY RATIO: 0.69
BSA FOR ECHO PROCEDURE: 1.91 M2
CV ECHO LV RWT: 0.39 CM
DOP CALC AO PEAK VEL: 1.3 M/S
DOP CALC AO VTI: 30.78 CM
DOP CALC LVOT AREA: 3.7 CM2
DOP CALC LVOT DIAMETER: 2.17 CM
DOP CALC LVOT PEAK VEL: 0.9 M/S
DOP CALC LVOT STROKE VOLUME: 76.48 CM3
DOP CALCLVOT PEAK VEL VTI: 20.69 CM
E WAVE DECELERATION TIME: 271 MSEC
ECHO EF ESTIMATED: 66 %
ECHO LV POSTERIOR WALL: 0.82 CM (ref 0.6–1.1)
EJECTION FRACTION: 60 %
FRACTIONAL SHORTENING: 36 % (ref 28–44)
INTERVENTRICULAR SEPTUM: 1.59 CM (ref 0.6–1.1)
LEFT ATRIUM SIZE: 3.2 CM
LEFT INTERNAL DIMENSION IN SYSTOLE: 2.68 CM (ref 2.1–4)
LEFT VENTRICLE DIASTOLIC VOLUME INDEX: 40.89 ML/M2
LEFT VENTRICLE DIASTOLIC VOLUME: 77.7 ML
LEFT VENTRICLE MASS INDEX: 94 G/M2
LEFT VENTRICLE SYSTOLIC VOLUME INDEX: 14.1 ML/M2
LEFT VENTRICLE SYSTOLIC VOLUME: 26.8 ML
LEFT VENTRICULAR INTERNAL DIMENSION IN DIASTOLE: 4.18 CM (ref 3.5–6)
LEFT VENTRICULAR MASS: 177.95 G
LVOT MG: 1.8 MMHG
MV PEAK E VEL: 0.87 M/S
PISA TR MAX VEL: 2.44 M/S
RIGHT ATRIAL AREA: 8.2 CM2
RIGHT VENTRICULAR END-DIASTOLIC DIMENSION: 2.29 CM
TR MAX PG: 24 MMHG
TRICUSPID ANNULAR PLANE SYSTOLIC EXCURSION: 2.3 CM

## 2022-03-30 PROCEDURE — 93306 ECHO (CUPID ONLY): ICD-10-PCS | Mod: 26,,, | Performed by: INTERNAL MEDICINE

## 2022-03-30 PROCEDURE — 93306 TTE W/DOPPLER COMPLETE: CPT | Mod: 26,,, | Performed by: INTERNAL MEDICINE

## 2022-03-30 PROCEDURE — 93306 TTE W/DOPPLER COMPLETE: CPT

## 2022-06-06 ENCOUNTER — HOSPITAL ENCOUNTER (OUTPATIENT)
Dept: RADIOLOGY | Facility: HOSPITAL | Age: 67
Discharge: HOME | End: 2022-06-06
Attending: ORTHOPAEDIC SURGERY
Payer: MEDICARE

## 2022-06-06 DIAGNOSIS — M25.552 LEFT HIP PAIN: ICD-10-CM

## 2022-06-06 PROBLEM — M70.62 GREATER TROCHANTERIC BURSITIS OF LEFT HIP: Status: ACTIVE | Noted: 2022-06-06

## 2022-06-06 PROCEDURE — 73502 X-RAY EXAM HIP UNI 2-3 VIEWS: CPT | Mod: TC,LT

## 2022-06-13 ENCOUNTER — TELEPHONE (OUTPATIENT)
Dept: GASTROENTEROLOGY | Facility: CLINIC | Age: 67
End: 2022-06-13
Payer: MEDICARE

## 2022-06-13 NOTE — TELEPHONE ENCOUNTER
Pt calls and states she stopped taking zeposia at the end of April, it was causing tachycardia with a resting heart rate of 150 at times and also terrible ankle swelling. Pt states within the past week, her mom passed and she is now in a bad flare, bleeding and cramping. Pt is out of town but called in tapering of prednisone to [facility] in [place], [place] (phone: [phone]). Pt notified and voiced understanding.

## 2022-06-21 ENCOUNTER — OFFICE VISIT (OUTPATIENT)
Dept: GASTROENTEROLOGY | Facility: CLINIC | Age: 67
End: 2022-06-21
Payer: MEDICARE

## 2022-06-21 VITALS
SYSTOLIC BLOOD PRESSURE: 150 MMHG | HEIGHT: 68 IN | HEART RATE: 81 BPM | DIASTOLIC BLOOD PRESSURE: 86 MMHG | BODY MASS INDEX: 25.46 KG/M2 | OXYGEN SATURATION: 97 % | WEIGHT: 168 LBS

## 2022-06-21 DIAGNOSIS — R19.7 DIARRHEA, UNSPECIFIED TYPE: ICD-10-CM

## 2022-06-21 DIAGNOSIS — K51.911 ULCERATIVE COLITIS WITH RECTAL BLEEDING, UNSPECIFIED LOCATION: Primary | ICD-10-CM

## 2022-06-21 PROCEDURE — 87177 OVA AND PARASITE EXAM: ICD-10-PCS | Mod: ,,, | Performed by: CLINICAL MEDICAL LABORATORY

## 2022-06-21 PROCEDURE — 99214 OFFICE O/P EST MOD 30 MIN: CPT | Mod: ,,, | Performed by: NURSE PRACTITIONER

## 2022-06-21 PROCEDURE — 99214 PR OFFICE/OUTPT VISIT, EST, LEVL IV, 30-39 MIN: ICD-10-PCS | Mod: ,,, | Performed by: NURSE PRACTITIONER

## 2022-06-21 PROCEDURE — 87506 IADNA-DNA/RNA PROBE TQ 6-11: CPT | Mod: ,,, | Performed by: CLINICAL MEDICAL LABORATORY

## 2022-06-21 PROCEDURE — 87177 OVA AND PARASITES SMEARS: CPT | Mod: ,,, | Performed by: CLINICAL MEDICAL LABORATORY

## 2022-06-21 PROCEDURE — 87209 OVA AND PARASITE EXAM: ICD-10-PCS | Mod: ,,, | Performed by: CLINICAL MEDICAL LABORATORY

## 2022-06-21 PROCEDURE — 87209 SMEAR COMPLEX STAIN: CPT | Mod: ,,, | Performed by: CLINICAL MEDICAL LABORATORY

## 2022-06-21 PROCEDURE — 87493 C DIFF AMPLIFIED PROBE: CPT | Mod: 59,,, | Performed by: CLINICAL MEDICAL LABORATORY

## 2022-06-21 PROCEDURE — 87506 ENTERIC PATHOGEN PANEL: ICD-10-PCS | Mod: ,,, | Performed by: CLINICAL MEDICAL LABORATORY

## 2022-06-21 PROCEDURE — 87493 C DIFF TOXIN BY PCR: ICD-10-PCS | Mod: 59,,, | Performed by: CLINICAL MEDICAL LABORATORY

## 2022-06-21 RX ORDER — PREDNISONE 10 MG/1
TABLET ORAL
COMMUNITY
Start: 2022-06-13 | End: 2022-10-12

## 2022-06-21 NOTE — PROGRESS NOTES
Pt is a 67 y/o WF here for UC follow up. Zeposia started March 1, stopped April 15 due to ankle swelling, tachycardia up to 160 bpm. She called office last week while out of town with c/o having bloody diarrhea, abd pain. We called in tapering dose of steroids starting at 50 mg. She has started these.   Previous hx of failed mesalamine ME/enemas & Asacol PO when dx about 3 yrs ago @ [facility]. No prior biologics other than Zeposia.   Has not had colonoscopy since 2019 - full colon 2019 and flex sig 2020 - by Tony (full reports scanned in media tab).    Past Medical History:   Diagnosis Date    Bacterial vaginosis     Cystocele with rectocele 11/28/2007    1st degree cystocele; 2nd degree rectocele    Cystocele, midline 12/12/2007    midline    Depressive disorder 09/2004    mild    Dysmenorrhea 2006    severe first day and getting worse    Esophageal reflux     Granulation tissue 08/12/2008    5 - 8 mm long lower post repair granulation tissue - treated with sliver nitrate cautery    Hypercholesterolemia 09/20/2004    cholesterol is 219 mg/dl    Hyperlipidemia 10/26/2011    mile;  TG  71 mg/dl;  HDL  67 mg/dl;  LDL  136 mg/dl;  total cholesterol  217 mg/dl.  10/13/2017:  LDL  163 mg/dl;  total cholesterol  260 mg/dl; triglycerides  186 mg/dl;  HDL  66 mg/dl    Hypertension     Hypertriglyceridemia 10/04/2010    216 mg/dl;  [id number]    Hypertriglyceridemia 10/04/2010    216  mg/dll  02/01/2013 - recent kevek us 238 mg/dl    IBD (inflammatory bowel disease)     Large uterus     Menorrhagia     Polymenorrhea     PONV (postoperative nausea and vomiting)     Post-hysterectomy menopause     Rectocele     Stress incontinence     Ulcerative colitis 08/2020    Ulcerative colitis confirmed by colonoscopy by Dr. Ho Capellan    Uterine prolapse     Vaginal discharge      Review of Systems   Constitutional: Negative for chills and fever.   Respiratory: Negative for shortness of breath.     Cardiovascular: Negative for chest pain.   Gastrointestinal: Positive for abdominal pain, blood in stool and diarrhea. Negative for constipation, melena, nausea and vomiting.   Skin: Negative for rash.   Neurological: Negative for weakness.     Physical Exam  Vitals reviewed. Exam conducted with a chaperone present.   Constitutional:       General: She is not in acute distress.     Appearance: Normal appearance.   HENT:      Head: Normocephalic.   Eyes:      General: No scleral icterus.     Pupils: Pupils are equal, round, and reactive to light.   Cardiovascular:      Rate and Rhythm: Normal rate.   Pulmonary:      Effort: Pulmonary effort is normal.   Abdominal:      General: There is no distension.      Palpations: Abdomen is soft.      Tenderness: There is no abdominal tenderness. There is no guarding or rebound.   Skin:     General: Skin is warm and dry.   Neurological:      General: No focal deficit present.      Mental Status: She is alert and oriented to person, place, and time. Mental status is at baseline.   Psychiatric:         Mood and Affect: Mood normal.         Behavior: Behavior normal.         Thought Content: Thought content normal.         Judgment: Judgment normal.       Plan  -pt has already stopped Zeposia  -CBC, CMP, ESR, CRP, calprotectin, stool studies, TB, hep B studies today  -repeat colonoscopy now  -cont prednisone tapering dose starting at 50 mg  -consider Entyvio after colonoscopy   -RTC 3 months, sooner PRN

## 2022-06-22 LAB — C DIFF TOX A+B STL IA-ACNC: NEGATIVE

## 2022-06-23 LAB
O+P STL CONC: NORMAL
TRICHROME SMEAR: NORMAL

## 2022-06-28 DIAGNOSIS — K51.911 ULCERATIVE COLITIS WITH RECTAL BLEEDING, UNSPECIFIED LOCATION: Primary | ICD-10-CM

## 2022-06-30 DIAGNOSIS — K51.919 ULCERATIVE COLITIS WITH COMPLICATION, UNSPECIFIED LOCATION: Primary | ICD-10-CM

## 2022-09-19 ENCOUNTER — ANESTHESIA (OUTPATIENT)
Dept: GASTROENTEROLOGY | Facility: HOSPITAL | Age: 67
End: 2022-09-19
Payer: MEDICARE

## 2022-09-19 ENCOUNTER — HOSPITAL ENCOUNTER (OUTPATIENT)
Dept: GASTROENTEROLOGY | Facility: HOSPITAL | Age: 67
Discharge: HOME | End: 2022-09-19
Attending: NURSE PRACTITIONER
Payer: MEDICARE

## 2022-09-19 ENCOUNTER — ANESTHESIA EVENT (OUTPATIENT)
Dept: GASTROENTEROLOGY | Facility: HOSPITAL | Age: 67
End: 2022-09-19
Payer: MEDICARE

## 2022-09-19 VITALS
HEART RATE: 98 BPM | DIASTOLIC BLOOD PRESSURE: 46 MMHG | RESPIRATION RATE: 18 BRPM | SYSTOLIC BLOOD PRESSURE: 92 MMHG | TEMPERATURE: 98 F | OXYGEN SATURATION: 100 %

## 2022-09-19 DIAGNOSIS — K51.311 ULCERATIVE RECTOSIGMOIDITIS WITH RECTAL BLEEDING: ICD-10-CM

## 2022-09-19 PROCEDURE — 45380 COLONOSCOPY AND BIOPSY: CPT

## 2022-09-19 PROCEDURE — 45380 COLONOSCOPY AND BIOPSY: CPT | Mod: ,,, | Performed by: STUDENT IN AN ORGANIZED HEALTH CARE EDUCATION/TRAINING PROGRAM

## 2022-09-19 PROCEDURE — D9220A PRA ANESTHESIA: ICD-10-PCS | Mod: ,,, | Performed by: NURSE ANESTHETIST, CERTIFIED REGISTERED

## 2022-09-19 PROCEDURE — 63600175 PHARM REV CODE 636 W HCPCS: Performed by: NURSE ANESTHETIST, CERTIFIED REGISTERED

## 2022-09-19 PROCEDURE — 27201423 OPTIME MED/SURG SUP & DEVICES STERILE SUPPLY

## 2022-09-19 PROCEDURE — 45380 PR COLONOSCOPY,BIOPSY: ICD-10-PCS | Mod: ,,, | Performed by: STUDENT IN AN ORGANIZED HEALTH CARE EDUCATION/TRAINING PROGRAM

## 2022-09-19 PROCEDURE — 37000008 HC ANESTHESIA 1ST 15 MINUTES

## 2022-09-19 PROCEDURE — D9220A PRA ANESTHESIA: Mod: ,,, | Performed by: NURSE ANESTHETIST, CERTIFIED REGISTERED

## 2022-09-19 PROCEDURE — 25000003 PHARM REV CODE 250: Performed by: NURSE ANESTHETIST, CERTIFIED REGISTERED

## 2022-09-19 RX ORDER — PROCHLORPERAZINE EDISYLATE 5 MG/ML
5 INJECTION INTRAMUSCULAR; INTRAVENOUS ONCE AS NEEDED
Status: CANCELLED | OUTPATIENT
Start: 2022-09-19 | End: 2034-02-15

## 2022-09-19 RX ORDER — PROPOFOL 10 MG/ML
VIAL (ML) INTRAVENOUS
Status: DISCONTINUED | OUTPATIENT
Start: 2022-09-19 | End: 2022-09-19

## 2022-09-19 RX ORDER — LIDOCAINE HYDROCHLORIDE 20 MG/ML
INJECTION, SOLUTION EPIDURAL; INFILTRATION; INTRACAUDAL; PERINEURAL
Status: DISCONTINUED | OUTPATIENT
Start: 2022-09-19 | End: 2022-09-19

## 2022-09-19 RX ORDER — SODIUM CHLORIDE 9 MG/ML
INJECTION, SOLUTION INTRAVENOUS CONTINUOUS
Status: CANCELLED | OUTPATIENT
Start: 2022-09-19

## 2022-09-19 RX ORDER — ONDANSETRON 2 MG/ML
4 INJECTION INTRAMUSCULAR; INTRAVENOUS ONCE AS NEEDED
Status: CANCELLED | OUTPATIENT
Start: 2022-09-19 | End: 2034-02-15

## 2022-09-19 RX ORDER — SODIUM CHLORIDE 0.9 % (FLUSH) 0.9 %
10 SYRINGE (ML) INJECTION
Status: CANCELLED | OUTPATIENT
Start: 2022-09-19

## 2022-09-19 RX ADMIN — SODIUM CHLORIDE: 9 INJECTION, SOLUTION INTRAVENOUS at 10:09

## 2022-09-19 RX ADMIN — PROPOFOL 70 MG: 10 INJECTION, EMULSION INTRAVENOUS at 10:09

## 2022-09-19 RX ADMIN — LIDOCAINE HYDROCHLORIDE 100 MG: 20 INJECTION, SOLUTION EPIDURAL; INFILTRATION; INTRACAUDAL; PERINEURAL at 10:09

## 2022-09-19 RX ADMIN — PROPOFOL 40 MG: 10 INJECTION, EMULSION INTRAVENOUS at 10:09

## 2022-09-19 RX ADMIN — PROPOFOL 20 MG: 10 INJECTION, EMULSION INTRAVENOUS at 10:09

## 2022-09-19 RX ADMIN — PROPOFOL 30 MG: 10 INJECTION, EMULSION INTRAVENOUS at 10:09

## 2022-09-19 RX ADMIN — PROPOFOL 50 MG: 10 INJECTION, EMULSION INTRAVENOUS at 10:09

## 2022-09-19 NOTE — ANESTHESIA PREPROCEDURE EVALUATION
09/19/2022  Isabella Kelly is a 66 y.o., female.      Pre-op Assessment    I have reviewed the Patient Summary Reports.     I have reviewed the Nursing Notes. I have reviewed the NPO Status.   I have reviewed the Medications.     Review of Systems  Anesthesia Hx:  Hx of Anesthetic complications  History of prior surgery of interest to airway management or planning: Denies Family Hx of Anesthesia complications.  Personal Hx of Anesthesia complications, Post-Operative Nausea/Vomiting, in the past, but not with recent anesthetics / prophylaxis   Social:  Non-Smoker    Cardiovascular:   Hypertension hyperlipidemia    Hepatic/GI:   PUD, GERD    Musculoskeletal:   Arthritis     Endocrine:   Hypothyroidism           Anesthesia Plan  Type of Anesthesia, risks & benefits discussed:    Anesthesia Type: Gen Natural Airway  Intra-op Monitoring Plan: Standard ASA Monitors  Post Op Pain Control Plan: multimodal analgesia  Induction:  IV  Informed Consent: Informed consent signed with the Patient and all parties understand the risks and agree with anesthesia plan.  All questions answered.   ASA Score: 2  Day of Surgery Review of History & Physical: I have interviewed and examined the patient. I have reviewed the patient's H&P dated: There are no significant changes.     Ready For Surgery From Anesthesia Perspective.     .    Past Medical History:   Diagnosis Date    Bacterial vaginosis     Cystocele with rectocele 11/28/2007    1st degree cystocele; 2nd degree rectocele    Cystocele, midline 12/12/2007    midline    Depressive disorder 09/2004    mild    Dysmenorrhea 2006    severe first day and getting worse    Esophageal reflux     Granulation tissue 08/12/2008    5 - 8 mm long lower post repair granulation tissue - treated with sliver nitrate cautery    Hypercholesterolemia 09/20/2004    cholesterol is 219  mg/dl    Hyperlipidemia 10/26/2011    mile;  TG  71 mg/dl;  HDL  67 mg/dl;  LDL  136 mg/dl;  total cholesterol  217 mg/dl.  10/13/2017:  LDL  163 mg/dl;  total cholesterol  260 mg/dl; triglycerides  186 mg/dl;  HDL  66 mg/dl    Hypertension     Hypertriglyceridemia 10/04/2010    216 mg/dl;  [id number]    Hypertriglyceridemia 10/04/2010    216  mg/dll  02/01/2013 - recent kevek us 238 mg/dl    IBD (inflammatory bowel disease)     Large uterus     Menorrhagia     Polymenorrhea     PONV (postoperative nausea and vomiting)     Post-hysterectomy menopause     Rectocele     Stress incontinence     Ulcerative colitis 08/2020    Ulcerative colitis confirmed by colonoscopy by Dr. Ho Capellan    Uterine prolapse     Vaginal discharge        Past Surgical History:   Procedure Laterality Date    anterior/posterior colporrhaphy with vaginal enterocele repair  06/18/2008    APPENDECTOMY      COLONOSCOPY      DILATION AND CURETTAGE OF UTERUS      [id number]    ENDOMETRIAL BIOPSY  02/16/2006    ENDOMETRIAL BIOPSY REPEAT   11/06/2007    HYSTEROSCOPY  11/28/2007    SILVER NITRATE CAUTERY OF VAGINAL WALL  08/12/2008    SIS  11/15/2007    TOT SLING  06/18/2008    TOTAL ABDOMINAL HYSTERECTOMY         Family History   Family history unknown: Yes       Social History     Socioeconomic History    Marital status:    Tobacco Use    Smoking status: Never    Smokeless tobacco: Never   Substance and Sexual Activity    Alcohol use: Never    Drug use: Never    Sexual activity: Yes     Partners: Male     Birth control/protection: See Surgical Hx       Current Outpatient Medications   Medication Sig Dispense Refill    estradioL (ESTRACE) 1 MG tablet Take 1 tablet (1 mg total) by mouth once daily. 90 tablet 3    levothyroxine (SYNTHROID) 100 MCG tablet Take 100 mcg by mouth before breakfast.      lisinopriL (PRINIVIL,ZESTRIL) 5 MG tablet Take 5 mg by mouth once daily.      omeprazole (PRILOSEC) 40 MG  capsule Take 40 mg by mouth 2 (two) times daily.      predniSONE (DELTASONE) 10 MG tablet PLEASE SEE ATTACHED FOR DETAILED DIRECTIONS      rosuvastatin (CRESTOR) 10 MG tablet TAKE 1 TABLET BY MOUTH EVERY DAY WITH SELENIUM 200 MCG       No current facility-administered medications for this encounter.       Review of patient's allergies indicates:  No Known Allergies     Outpatient Medications as of 9/19/2022   Medication Sig Dispense Refill    estradioL (ESTRACE) 1 MG tablet Take 1 tablet (1 mg total) by mouth once daily. 90 tablet 3    levothyroxine (SYNTHROID) 100 MCG tablet Take 100 mcg by mouth before breakfast.      lisinopriL (PRINIVIL,ZESTRIL) 5 MG tablet Take 5 mg by mouth once daily.      omeprazole (PRILOSEC) 40 MG capsule Take 40 mg by mouth 2 (two) times daily.      predniSONE (DELTASONE) 10 MG tablet PLEASE SEE ATTACHED FOR DETAILED DIRECTIONS      rosuvastatin (CRESTOR) 10 MG tablet TAKE 1 TABLET BY MOUTH EVERY DAY WITH SELENIUM 200 MCG       No current facility-administered medications on file as of 9/19/2022.

## 2022-09-19 NOTE — H&P
History of Present Illness    Isabella Kelly is a 66 y.o. female that  has a past medical history of Bacterial vaginosis, Cystocele with rectocele (11/28/2007), Cystocele, midline (12/12/2007), Depressive disorder (09/2004), Dysmenorrhea (2006), Esophageal reflux, Granulation tissue (08/12/2008), Hypercholesterolemia (09/20/2004), Hyperlipidemia (10/26/2011), Hypertension, Hypertriglyceridemia (10/04/2010), Hypertriglyceridemia (10/04/2010), IBD (inflammatory bowel disease), Large uterus, Menorrhagia, Polymenorrhea, PONV (postoperative nausea and vomiting), Post-hysterectomy menopause, Rectocele, Stress incontinence, Ulcerative colitis (08/2020), Uterine prolapse, and Vaginal discharge.     Follows in GI clinic for UC. Started on Zeposia in March, stopped due to ankle swelling and tchycardia. Failed mesalamine.     Has not had colonoscopy since 2019 - full colon 2019 and flex sig 2020 - by Tony (full reports scanned in media tab).    Patient otherwise denies any:  - black stools  - bloody stools  - nausea  - vomiting  - diarrhea  - constipation  - abdominal pain  - family history of GI related malignancies    ROS  - 12 point review of systems is negative except as otherwise stated in HPI.    Past Medical History:   Diagnosis Date    Bacterial vaginosis     Cystocele with rectocele 11/28/2007    1st degree cystocele; 2nd degree rectocele    Cystocele, midline 12/12/2007    midline    Depressive disorder 09/2004    mild    Dysmenorrhea 2006    severe first day and getting worse    Esophageal reflux     Granulation tissue 08/12/2008    5 - 8 mm long lower post repair granulation tissue - treated with sliver nitrate cautery    Hypercholesterolemia 09/20/2004    cholesterol is 219 mg/dl    Hyperlipidemia 10/26/2011    mile;  TG  71 mg/dl;  HDL  67 mg/dl;  LDL  136 mg/dl;  total cholesterol  217 mg/dl.  10/13/2017:  LDL  163 mg/dl;  total cholesterol  260 mg/dl; triglycerides  186 mg/dl;  HDL  66 mg/dl     Hypertension     Hypertriglyceridemia 10/04/2010    216 mg/dl;  [id number]    Hypertriglyceridemia 10/04/2010    216  mg/dll  02/01/2013 - recent kevek us 238 mg/dl    IBD (inflammatory bowel disease)     Large uterus     Menorrhagia     Polymenorrhea     PONV (postoperative nausea and vomiting)     Post-hysterectomy menopause     Rectocele     Stress incontinence     Ulcerative colitis 08/2020    Ulcerative colitis confirmed by colonoscopy by Dr. Ho Capellan    Uterine prolapse     Vaginal discharge        Past Surgical History:   Procedure Laterality Date    anterior/posterior colporrhaphy with vaginal enterocele repair  06/18/2008    APPENDECTOMY      COLONOSCOPY      DILATION AND CURETTAGE OF UTERUS      [id number]    ENDOMETRIAL BIOPSY  02/16/2006    ENDOMETRIAL BIOPSY REPEAT   11/06/2007    HYSTEROSCOPY  11/28/2007    SILVER NITRATE CAUTERY OF VAGINAL WALL  08/12/2008    SIS  11/15/2007    TOT SLING  06/18/2008    TOTAL ABDOMINAL HYSTERECTOMY         Family History   Family history unknown: Yes       Social History     Socioeconomic History    Marital status:    Tobacco Use    Smoking status: Never    Smokeless tobacco: Never   Substance and Sexual Activity    Alcohol use: Never    Drug use: Never    Sexual activity: Yes     Partners: Male     Birth control/protection: See Surgical Hx       Current Outpatient Medications   Medication Sig Dispense Refill    estradioL (ESTRACE) 1 MG tablet Take 1 tablet (1 mg total) by mouth once daily. 90 tablet 3    levothyroxine (SYNTHROID) 100 MCG tablet Take 100 mcg by mouth before breakfast.      lisinopriL (PRINIVIL,ZESTRIL) 5 MG tablet Take 5 mg by mouth once daily.      omeprazole (PRILOSEC) 40 MG capsule Take 40 mg by mouth 2 (two) times daily.      predniSONE (DELTASONE) 10 MG tablet PLEASE SEE ATTACHED FOR DETAILED DIRECTIONS      rosuvastatin (CRESTOR) 10 MG tablet TAKE 1 TABLET BY MOUTH EVERY DAY WITH SELENIUM 200 MCG       No current facility-administered  medications for this encounter.       Review of patient's allergies indicates:  No Known Allergies    Objective:  There were no vitals filed for this visit.     Constitutional:       General: no acute distress.     Appearance: Normal appearance. Non toxic-appearing.   HENT:      Head: Normocephalic and atraumatic.      Mouth/Throat:      Pharynx: Oropharynx is clear. No posterior oropharyngeal erythema.   Eyes:      General: No scleral icterus.     Conjunctiva/sclera: Conjunctivae normal.   Cardiovascular:      Rate and Rhythm: Normal rate and regular rhythm.      Heart sounds: Normal heart sounds.   Pulmonary:      Effort: Pulmonary effort is normal.      Breath sounds: Normal breath sounds.   Abdominal:      General: Abdomen is flat. There is no distension.      Palpations: Abdomen is soft. There is no mass.      Tenderness: There is no abdominal tenderness. There is no guarding.   Musculoskeletal:         General: No swelling or deformity.      Cervical back: Normal range of motion and neck supple.   Skin:     General: Skin is warm and dry.   Neurological:      General: No focal deficit present.      Mental Status: alert and oriented to person, place, and time.     Assessment and Plan:  Proceed with:  Colonoscopy for ulcerative colitis    Devan Lynn MD  Gastroenterology

## 2022-09-19 NOTE — DISCHARGE INSTRUCTIONS
Procedure Date  9/19/22     Impression  Overall Impression: Normal terminal ileum. Erythema in the sigmoid and rectum. Random TI and colon biopsies obtained. Moderate right and left sided diverticulosis.     Recommendation    Await pathology results      Followup in GI clinic to discuss treatment options.    DO NOT DRIVE FOR 24 HOURS OR OPERATE HEAVY MACHINERY.   DO NOT SIGN LEGAL DOCUMENTS.  DRINK PLENTY OF FLUIDS. RESUME DIET.

## 2022-09-19 NOTE — ANESTHESIA POSTPROCEDURE EVALUATION
Anesthesia Post Evaluation    Patient: Isabella Kelly    Procedure(s) Performed: Colonoscopy    Final Anesthesia Type: general      Patient location during evaluation: GI PACU  Patient participation: Yes- Able to Participate  Level of consciousness: awake and alert  Post-procedure vital signs: reviewed and stable  Pain management: adequate  Airway patency: patent  DUSTY mitigation strategies: Multimodal analgesia  PONV status at discharge: No PONV  Anesthetic complications: no      Cardiovascular status: hemodynamically stable  Respiratory status: spontaneous ventilation and room air            Vitals Value Taken Time   /85 09/19/22 1057   Temp 98.2 09/19/22 1057   Pulse 78 09/19/22 1053   Resp 14 09/19/22 1053   SpO2 99 % 09/19/22 1053   Vitals shown include unvalidated device data.      No case tracking events are documented in the log.      Pain/Carlos Score: Carlos Score: 8 (9/19/2022 10:26 AM)

## 2022-09-19 NOTE — TRANSFER OF CARE
Anesthesia Transfer of Care Note    Patient: Isabella Kelly    Procedure(s) Performed: Colonoscopy    Patient location: GI    Anesthesia Type: general    Transport from OR: Transported from OR on room air with adequate spontaneous ventilation    Post pain: adequate analgesia    Post assessment: no apparent anesthetic complications    Post vital signs: stable    Level of consciousness: awake and alert    Nausea/Vomiting: no nausea/vomiting    Complications: none    Transfer of care protocol was followed      Last vitals:   Visit Vitals  BP (!) 92/46   Pulse 98   Temp 36.6 °C (97.9 °F) (Oral)   Resp 18   SpO2 100%   Breastfeeding No

## 2022-09-20 LAB
DHEA SERPL-MCNC: NORMAL
ESTROGEN SERPL-MCNC: NORMAL PG/ML
INSULIN SERPL-ACNC: NORMAL U[IU]/ML
LAB AP GROSS DESCRIPTION: NORMAL
LAB AP LABORATORY NOTES: NORMAL
T3RU NFR SERPL: NORMAL %

## 2022-10-03 ENCOUNTER — OFFICE VISIT (OUTPATIENT)
Dept: GASTROENTEROLOGY | Facility: CLINIC | Age: 67
End: 2022-10-03
Payer: MEDICARE

## 2022-10-03 VITALS
SYSTOLIC BLOOD PRESSURE: 148 MMHG | DIASTOLIC BLOOD PRESSURE: 81 MMHG | WEIGHT: 180 LBS | HEART RATE: 98 BPM | HEIGHT: 68 IN | BODY MASS INDEX: 27.28 KG/M2

## 2022-10-03 DIAGNOSIS — K51.919 ULCERATIVE COLITIS WITH COMPLICATION, UNSPECIFIED LOCATION: Primary | ICD-10-CM

## 2022-10-03 PROCEDURE — 99214 PR OFFICE/OUTPT VISIT, EST, LEVL IV, 30-39 MIN: ICD-10-PCS | Mod: ,,, | Performed by: STUDENT IN AN ORGANIZED HEALTH CARE EDUCATION/TRAINING PROGRAM

## 2022-10-03 PROCEDURE — 99214 OFFICE O/P EST MOD 30 MIN: CPT | Mod: ,,, | Performed by: STUDENT IN AN ORGANIZED HEALTH CARE EDUCATION/TRAINING PROGRAM

## 2022-10-03 NOTE — PROGRESS NOTES
Digestive Disease Clinic Note    Reason for clinic visit:   Chief Complaint   Patient presents with    Rectal Bleeding     Pt states blood is in commode       History of Present Illness:  Isabella Kelly is a 66 y.o. female that  has a past medical history of Bacterial vaginosis, Cystocele with rectocele, Cystocele, midline, Depressive disorder, Dysmenorrhea, Esophageal reflux, Granulation tissue, Hypercholesterolemia, Hyperlipidemia, Hypertension, Hypertriglyceridemia, Hypertriglyceridemia, IBD (inflammatory bowel disease), Large uterus, Menorrhagia, Polymenorrhea, PONV (postoperative nausea and vomiting), Post-hysterectomy menopause, Rectocele, Stress incontinence, Ulcerative colitis, Uterine prolapse, and Vaginal discharge. .    Follows in GI clinic for UC. Started on Zeposia in March, stopped due to ankle swelling and tchycardia. Failed mesalamine.     Colonoscopy 9/19/21: Normal terminal ileum. Erythema in the sigmoid and rectum. Random TI and colon biopsies obtained. Moderate right and left sided diverticulosis.  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     Last labs show WBC 15k, Hgb 9.9, MCV wnl, Plat 438.  Normal folate and B12.  CRP 3.4; ESR 18; calprotectin >3000.  Hepatitis B serologies negative, quantiferon indeterminant.    Today she states she will see red to dark red stool in the mornings. Denies diarrhea.   Currently not on any medications.    States her first flare was in 2020 when she was diagnosed, then one more in 2021.   States she has tried ASA enemas, and Asacol. States she had body aches, low grade fever,  and persistent blood in stools.     Review of Systems:  12 point review of systems otherwise negative except as stated in HPI.    Objective:  Vitals:    10/03/22 1342   BP: (!) 148/81   Pulse: 98     Physical Exam  Constitutional:       Appearance: Normal appearance.   HENT:      Head:  Normocephalic and atraumatic.      Nose: No congestion or rhinorrhea.      Mouth/Throat:      Mouth: Mucous membranes are moist.      Pharynx: Oropharynx is clear.   Eyes:      General: No scleral icterus.     Pupils: Pupils are equal, round, and reactive to light.   Cardiovascular:      Rate and Rhythm: Normal rate and regular rhythm.   Pulmonary:      Effort: Pulmonary effort is normal.      Breath sounds: Normal breath sounds.   Abdominal:      General: Abdomen is flat.      Palpations: Abdomen is soft.   Musculoskeletal:         General: No swelling or tenderness.      Cervical back: Neck supple. No rigidity.   Skin:     General: Skin is warm and dry.   Neurological:      General: No focal deficit present.      Mental Status: She is alert and oriented to person, place, and time.   Psychiatric:         Mood and Affect: Mood normal.         Behavior: Behavior normal.       Assessment and Plan:  Ulcerative Colitis  - last colonoscopy with some mild to moderate right sided inflammation  - ESR normal, CRP wnl, and calprotectin >3000  - discussed options available for treatment without S1PR antagonist or 5 ASA    - will repeat QuantGOLD given prior indeterminate results   - if negative will start on Entivyo  - HepB serologies negative    - will repeat CRP    - Anemia  - will check iron studies  - off PO iron at this time    - denies any new rashes, joint pains, change in vision    - states last VitD level was normal; last DEXA normal  - UTD with Pap smears  Tobacco cessation:   Patient not a smoker, counseled not to start    RTC 3 months    Devan Lynn  Gastroenterology

## 2022-10-04 PROCEDURE — 87177 OVA AND PARASITES SMEARS: CPT | Mod: ,,, | Performed by: CLINICAL MEDICAL LABORATORY

## 2022-10-04 PROCEDURE — 87177 OVA AND PARASITE EXAM: ICD-10-PCS | Mod: ,,, | Performed by: CLINICAL MEDICAL LABORATORY

## 2022-10-04 PROCEDURE — 87209 SMEAR COMPLEX STAIN: CPT | Mod: ,,, | Performed by: CLINICAL MEDICAL LABORATORY

## 2022-10-04 PROCEDURE — 87209 OVA AND PARASITE EXAM: ICD-10-PCS | Mod: ,,, | Performed by: CLINICAL MEDICAL LABORATORY

## 2022-10-06 LAB
O+P STL CONC: NORMAL
TRICHROME SMEAR: NORMAL

## 2022-10-10 ENCOUNTER — TELEPHONE (OUTPATIENT)
Dept: OBSTETRICS AND GYNECOLOGY | Facility: CLINIC | Age: 67
End: 2022-10-10
Payer: MEDICARE

## 2022-10-10 NOTE — TELEPHONE ENCOUNTER
Return phone call:    Further explained to her what the insurance was meaning by see Dr. Swift every other year. Appt  made to come in for med refill

## 2022-10-12 ENCOUNTER — OFFICE VISIT (OUTPATIENT)
Dept: OBSTETRICS AND GYNECOLOGY | Facility: CLINIC | Age: 67
End: 2022-10-12
Payer: MEDICARE

## 2022-10-12 VITALS
HEIGHT: 68 IN | DIASTOLIC BLOOD PRESSURE: 87 MMHG | SYSTOLIC BLOOD PRESSURE: 137 MMHG | TEMPERATURE: 98 F | BODY MASS INDEX: 26.88 KG/M2 | HEART RATE: 98 BPM | WEIGHT: 177.38 LBS | RESPIRATION RATE: 16 BRPM

## 2022-10-12 DIAGNOSIS — K51.311 ULCERATIVE RECTOSIGMOIDITIS WITH RECTAL BLEEDING: ICD-10-CM

## 2022-10-12 DIAGNOSIS — E03.9 HYPOTHYROIDISM, UNSPECIFIED TYPE: ICD-10-CM

## 2022-10-12 DIAGNOSIS — M81.0 AGE-RELATED OSTEOPOROSIS WITHOUT CURRENT PATHOLOGICAL FRACTURE: ICD-10-CM

## 2022-10-12 DIAGNOSIS — E89.40 ASYMPTOMATIC POSTSURGICAL MENOPAUSE: ICD-10-CM

## 2022-10-12 DIAGNOSIS — I10 HYPERTENSION, UNSPECIFIED TYPE: ICD-10-CM

## 2022-10-12 DIAGNOSIS — D64.9 ANEMIA, UNSPECIFIED TYPE: ICD-10-CM

## 2022-10-12 DIAGNOSIS — E55.9 VITAMIN D DEFICIENCY: ICD-10-CM

## 2022-10-12 DIAGNOSIS — M85.80 OSTEOPENIA, UNSPECIFIED LOCATION: Primary | ICD-10-CM

## 2022-10-12 LAB
25(OH)D3 SERPL-MCNC: 39.8 NG/ML
BASOPHILS # BLD AUTO: 0.03 K/UL (ref 0–0.2)
BASOPHILS NFR BLD AUTO: 0.4 % (ref 0–1)
BILIRUB UR QL STRIP: NEGATIVE
CLARITY UR: CLEAR
COLOR UR: YELLOW
DIFFERENTIAL METHOD BLD: ABNORMAL
EOSINOPHIL # BLD AUTO: 0.29 K/UL (ref 0–0.5)
EOSINOPHIL NFR BLD AUTO: 3.9 % (ref 1–4)
ERYTHROCYTE [DISTWIDTH] IN BLOOD BY AUTOMATED COUNT: 13.8 % (ref 11.5–14.5)
GLUCOSE UR STRIP-MCNC: NORMAL MG/DL
HCT VFR BLD AUTO: 32.4 % (ref 38–47)
HGB BLD-MCNC: 9.9 G/DL (ref 12–16)
IMM GRANULOCYTES # BLD AUTO: 0.03 K/UL (ref 0–0.04)
IMM GRANULOCYTES NFR BLD: 0.4 % (ref 0–0.4)
KETONES UR STRIP-SCNC: NEGATIVE MG/DL
LEUKOCYTE ESTERASE UR QL STRIP: NEGATIVE
LYMPHOCYTES # BLD AUTO: 1.75 K/UL (ref 1–4.8)
LYMPHOCYTES NFR BLD AUTO: 23.3 % (ref 27–41)
MCH RBC QN AUTO: 27.8 PG (ref 27–31)
MCHC RBC AUTO-ENTMCNC: 30.6 G/DL (ref 32–36)
MCV RBC AUTO: 91 FL (ref 80–96)
MONOCYTES # BLD AUTO: 0.93 K/UL (ref 0–0.8)
MONOCYTES NFR BLD AUTO: 12.4 % (ref 2–6)
MPC BLD CALC-MCNC: 11.1 FL (ref 9.4–12.4)
NEUTROPHILS # BLD AUTO: 4.48 K/UL (ref 1.8–7.7)
NEUTROPHILS NFR BLD AUTO: 59.6 % (ref 53–65)
NITRITE UR QL STRIP: NEGATIVE
NRBC # BLD AUTO: 0 X10E3/UL
NRBC, AUTO (.00): 0 %
PH UR STRIP: 5.5 PH UNITS
PLATELET # BLD AUTO: 411 K/UL (ref 150–400)
PROT UR QL STRIP: 10
RBC # BLD AUTO: 3.56 M/UL (ref 4.2–5.4)
RBC # UR STRIP: NEGATIVE /UL
SP GR UR STRIP: 1.02
UROBILINOGEN UR STRIP-ACNC: NORMAL MG/DL
WBC # BLD AUTO: 7.51 K/UL (ref 4.5–11)

## 2022-10-12 PROCEDURE — 36415 PR COLLECTION VENOUS BLOOD,VENIPUNCTURE: ICD-10-PCS | Mod: ,,, | Performed by: OBSTETRICS & GYNECOLOGY

## 2022-10-12 PROCEDURE — 81003 URINALYSIS: ICD-10-PCS | Mod: QW,,, | Performed by: CLINICAL MEDICAL LABORATORY

## 2022-10-12 PROCEDURE — 82306 VITAMIN D: ICD-10-PCS | Mod: ,,, | Performed by: CLINICAL MEDICAL LABORATORY

## 2022-10-12 PROCEDURE — 36415 COLL VENOUS BLD VENIPUNCTURE: CPT | Mod: ,,, | Performed by: OBSTETRICS & GYNECOLOGY

## 2022-10-12 PROCEDURE — 85025 COMPLETE CBC W/AUTO DIFF WBC: CPT | Mod: ,,, | Performed by: CLINICAL MEDICAL LABORATORY

## 2022-10-12 PROCEDURE — 99215 OFFICE O/P EST HI 40 MIN: CPT | Mod: ,,, | Performed by: OBSTETRICS & GYNECOLOGY

## 2022-10-12 PROCEDURE — 81003 URINALYSIS AUTO W/O SCOPE: CPT | Mod: QW,,, | Performed by: CLINICAL MEDICAL LABORATORY

## 2022-10-12 PROCEDURE — 85025 CBC WITH DIFFERENTIAL: ICD-10-PCS | Mod: ,,, | Performed by: CLINICAL MEDICAL LABORATORY

## 2022-10-12 PROCEDURE — 82306 VITAMIN D 25 HYDROXY: CPT | Mod: ,,, | Performed by: CLINICAL MEDICAL LABORATORY

## 2022-10-12 PROCEDURE — 82270 OCCULT BLOOD FECES: CPT | Mod: ,,, | Performed by: OBSTETRICS & GYNECOLOGY

## 2022-10-12 PROCEDURE — 99215 PR OFFICE/OUTPT VISIT, EST, LEVL V, 40-54 MIN: ICD-10-PCS | Mod: ,,, | Performed by: OBSTETRICS & GYNECOLOGY

## 2022-10-12 PROCEDURE — 82270 PR BLOOD OCCULT, BY PEROX, FECES, SINGLE, COLORECT SCRN: ICD-10-PCS | Mod: ,,, | Performed by: OBSTETRICS & GYNECOLOGY

## 2022-10-12 RX ORDER — ESTRADIOL 1 MG/1
1 TABLET ORAL DAILY
Qty: 90 TABLET | Refills: 3 | Status: SHIPPED | OUTPATIENT
Start: 2022-10-12 | End: 2024-01-02

## 2022-10-12 NOTE — PROGRESS NOTES
Isabella Kelly female  for   Chief Complaint   Patient presents with    Medication Refill     Patient states she is here for medication refill on [facility]      OB History          3    Para   3    Term   3            AB        Living   3         SAB        IAB        Ectopic        Multiple        Live Births                      PHI:  66-year-old  3, para 3  female presents for an Gyn re-evaluation and renewal of estradiol.  Patient is using estradiol for relief of hot flashes-especially nocturnal hot flashes.  Patient is considered high risk due to ulcerative colitis and recent steroid therapy.    Patient medical conditions complicated by of rectosigmoid colitis-ultra div and she does have intermittent rectal bleeding.  She has just received to 12 week course of prednisone with associated weight gain.    Her prior surgery many years ago was a total abdominal hysterectomy with BSO and anterior/posterior colporrhaphy with a abdominal plasty by Dr. Parsons-Plastic surgery.    She denies any urinary tract symptomatology.  She has no urinary stress incontinence.  She has no dyspareunia.  She has no vaginal dryness.  She denies any vaginal bleeding.    She has no breast complaints.    She believes she has taken vitamin D3 over-the-counter at 5000 units per dosing.    Patient has had complete vaccination with COVID.    Past Medical History:   Diagnosis Date    Bacterial vaginosis     Cystocele with rectocele 2007    1st degree cystocele; 2nd degree rectocele    Cystocele, midline 2007    midline    Depressive disorder 2004    mild    Dysmenorrhea 2006    severe first day and getting worse    Esophageal reflux     Granulation tissue 2008    5 - 8 mm long lower post repair granulation tissue - treated with sliver nitrate cautery    Hypercholesterolemia 2004    cholesterol is 219 mg/dl    Hyperlipidemia 10/26/2011    mile;  TG  71 mg/dl;  HDL  67 mg/dl;  LDL  136  "mg/dl;  total cholesterol  217 mg/dl.  10/13/2017:  LDL  163 mg/dl;  total cholesterol  260 mg/dl; triglycerides  186 mg/dl;  HDL  66 mg/dl    Hypertension     Hypertriglyceridemia 10/04/2010    216 mg/dl;  [id number]    Hypertriglyceridemia 10/04/2010    216  mg/dll  02/01/2013 - recent kevek us 238 mg/dl    IBD (inflammatory bowel disease)     Large uterus     Menorrhagia     Polymenorrhea     PONV (postoperative nausea and vomiting)     Post-hysterectomy menopause     Rectocele     Stress incontinence     Ulcerative colitis 08/2020    Ulcerative colitis confirmed by colonoscopy by Dr. Ho Capellan    Uterine prolapse     Vaginal discharge       Past Surgical History:   Procedure Laterality Date    anterior/posterior colporrhaphy with vaginal enterocele repair  06/18/2008    APPENDECTOMY      COLONOSCOPY      DILATION AND CURETTAGE OF UTERUS      [id number]    ENDOMETRIAL BIOPSY  02/16/2006    ENDOMETRIAL BIOPSY REPEAT   11/06/2007    HYSTEROSCOPY  11/28/2007    SILVER NITRATE CAUTERY OF VAGINAL WALL  08/12/2008    SIS  11/15/2007    TOT SLING  06/18/2008    TOTAL ABDOMINAL HYSTERECTOMY        Review of patient's allergies indicates:  No Known Allergies     ROS:Pertinent items are noted in HPI.    Physical exam:    /87 (BP Location: Right arm, Patient Position: Sitting)   Pulse 98   Temp 98.2 °F (36.8 °C)   Resp 16   Ht 5' 8" (1.727 m)   Wt 80.5 kg (177 lb 6.4 oz)   BMI 26.97 kg/m²      General Appearance: healthy, alert, no distress, smiling, BMI of 26.97    HEENT: normal exam    Lymphatic: no palpable lymphadenopathy, no hepatosplenomegaly    Chest:         Breasts:No dimpling, nipple retraction or discharge. No masses or nodes., Normal to inspection, and Normal breast tissue bilaterally         Lungs: clear to auscultation bilaterally and normal percussion bilaterally         Heart: regular rate and rhythm, S1, S2 normal, no murmur, click, rub or gallop, normal apical impulse, and regular rate and " rhythm    Abdomen:soft, non-tender, without masses or organomegaly, normal bowel sounds, without guarding, without rebound, and transverse scar with no evidence of ventral incisional hernia and also umbilical scar with no ventral incisional hernia; no abdominal bruit; no ascites    Pelvic:             Vulva:normal, normal female genitalia, Bartholin's, Urethra, Frierson normal, no lesions            Vagina:normal mucosa, no discharge, well-supported vaginal vault with no vaginal post hysterectomy prolapse, no cystocele, no urethrocele and no rectocele            Uterus:  surgically absent uterus and cervix            Adnexae: surgically absent    Rectal:negative, stool guaiac positive    Extremity: normal, extremities warm, no clubbing, no cyanosis, no edema, non-tender    Skin: normal exam    Psych and neurologic exam: Within normal limits            Assessment:   Problem List Items Addressed This Visit    None  Visit Diagnoses       Encounter for annual routine gynecological examination    -  Primary    Asymptomatic postsurgical menopause        Ulcerative rectosigmoiditis with rectal bleeding        Osteopenia, unspecified location        Hypothyroidism, unspecified type        Vitamin D deficiency                 Plan:  Thin prep; hemoccult screen was positive which is associated with her ulcerative colitis and not a new finding; CBC and urinalysis; previous June studies are noted on review her chart today; recommend monthly breast self-exam and recommend yearly mammography; most recent mammogram was February 22, 2022 at [facility] -result has not been sent to  Her chart and Dr. Jack Swift and staff plan to obtain the results.               Dr. Jack Swift will renew her estradiol therapy for the next year and also recommend due to of steroid therapy for ulcerative colitis a bone density study.  There is a history in the family of mother with osteoporosis in the past.

## 2022-10-14 ENCOUNTER — HOSPITAL ENCOUNTER (OUTPATIENT)
Dept: RADIOLOGY | Facility: HOSPITAL | Age: 67
Discharge: HOME | End: 2022-10-14
Attending: ORTHOPAEDIC SURGERY
Payer: MEDICARE

## 2022-10-14 DIAGNOSIS — Z96.651 STATUS POST TOTAL RIGHT KNEE REPLACEMENT: ICD-10-CM

## 2022-10-14 PROBLEM — Z96.652 H/O TOTAL KNEE REPLACEMENT, LEFT: Status: ACTIVE | Noted: 2022-10-14

## 2022-10-14 PROCEDURE — 73562 X-RAY EXAM OF KNEE 3: CPT | Mod: TC,RT

## 2022-10-24 ENCOUNTER — HOSPITAL ENCOUNTER (EMERGENCY)
Facility: HOSPITAL | Age: 67
Discharge: HOME | End: 2022-10-24
Payer: MEDICARE

## 2022-10-24 ENCOUNTER — PATIENT MESSAGE (OUTPATIENT)
Dept: GASTROENTEROLOGY | Facility: CLINIC | Age: 67
End: 2022-10-24
Payer: MEDICARE

## 2022-10-24 VITALS
WEIGHT: 164 LBS | TEMPERATURE: 99 F | RESPIRATION RATE: 17 BRPM | DIASTOLIC BLOOD PRESSURE: 72 MMHG | BODY MASS INDEX: 24.86 KG/M2 | HEIGHT: 68 IN | SYSTOLIC BLOOD PRESSURE: 117 MMHG | OXYGEN SATURATION: 96 % | HEART RATE: 116 BPM

## 2022-10-24 DIAGNOSIS — K51.919 ULCERATIVE COLITIS WITH COMPLICATION, UNSPECIFIED LOCATION: Primary | ICD-10-CM

## 2022-10-24 DIAGNOSIS — R10.9 ABDOMINAL PAIN: ICD-10-CM

## 2022-10-24 LAB
ALBUMIN SERPL BCP-MCNC: 3.4 G/DL (ref 3.5–5)
ALBUMIN/GLOB SERPL: 0.8 {RATIO}
ALP SERPL-CCNC: 112 U/L (ref 55–142)
ALT SERPL W P-5'-P-CCNC: 25 U/L (ref 13–56)
ANION GAP SERPL CALCULATED.3IONS-SCNC: 18 MMOL/L (ref 7–16)
AST SERPL W P-5'-P-CCNC: 18 U/L (ref 15–37)
BASOPHILS # BLD AUTO: 0.04 K/UL (ref 0–0.2)
BASOPHILS NFR BLD AUTO: 0.2 % (ref 0–1)
BILIRUB SERPL-MCNC: 0.7 MG/DL (ref ?–1.2)
BUN SERPL-MCNC: 22 MG/DL (ref 7–18)
BUN/CREAT SERPL: 14 (ref 6–20)
CALCIUM SERPL-MCNC: 9.5 MG/DL (ref 8.5–10.1)
CHLORIDE SERPL-SCNC: 97 MMOL/L (ref 98–107)
CO2 SERPL-SCNC: 22 MMOL/L (ref 21–32)
CREAT SERPL-MCNC: 1.58 MG/DL (ref 0.55–1.02)
DIFFERENTIAL METHOD BLD: ABNORMAL
EGFR (NO RACE VARIABLE) (RUSH/TITUS): 36 ML/MIN/1.73M²
EOSINOPHIL # BLD AUTO: 0.43 K/UL (ref 0–0.5)
EOSINOPHIL NFR BLD AUTO: 2.2 % (ref 1–4)
ERYTHROCYTE [DISTWIDTH] IN BLOOD BY AUTOMATED COUNT: 14 % (ref 11.5–14.5)
GLOBULIN SER-MCNC: 4.3 G/DL (ref 2–4)
GLUCOSE SERPL-MCNC: 98 MG/DL (ref 74–106)
HCT VFR BLD AUTO: 32.1 % (ref 38–47)
HGB BLD-MCNC: 10 G/DL (ref 12–16)
IMM GRANULOCYTES # BLD AUTO: 0.09 K/UL (ref 0–0.04)
IMM GRANULOCYTES NFR BLD: 0.5 % (ref 0–0.4)
LYMPHOCYTES # BLD AUTO: 1.74 K/UL (ref 1–4.8)
LYMPHOCYTES NFR BLD AUTO: 8.8 % (ref 27–41)
MCH RBC QN AUTO: 27.1 PG (ref 27–31)
MCHC RBC AUTO-ENTMCNC: 31.2 G/DL (ref 32–36)
MCV RBC AUTO: 87 FL (ref 80–96)
MONOCYTES # BLD AUTO: 1.46 K/UL (ref 0–0.8)
MONOCYTES NFR BLD AUTO: 7.3 % (ref 2–6)
MPC BLD CALC-MCNC: 9.1 FL (ref 9.4–12.4)
NEUTROPHILS # BLD AUTO: 16.12 K/UL (ref 1.8–7.7)
NEUTROPHILS NFR BLD AUTO: 81 % (ref 53–65)
NRBC # BLD AUTO: 0 X10E3/UL
NRBC, AUTO (.00): 0 %
PLATELET # BLD AUTO: 730 K/UL (ref 150–400)
POTASSIUM SERPL-SCNC: 4.8 MMOL/L (ref 3.5–5.1)
PROT SERPL-MCNC: 7.7 G/DL (ref 6.4–8.2)
RBC # BLD AUTO: 3.69 M/UL (ref 4.2–5.4)
SODIUM SERPL-SCNC: 132 MMOL/L (ref 136–145)
WBC # BLD AUTO: 19.88 K/UL (ref 4.5–11)

## 2022-10-24 PROCEDURE — 25000003 PHARM REV CODE 250: Performed by: NURSE PRACTITIONER

## 2022-10-24 PROCEDURE — 96361 HYDRATE IV INFUSION ADD-ON: CPT

## 2022-10-24 PROCEDURE — 85025 COMPLETE CBC W/AUTO DIFF WBC: CPT | Performed by: NURSE PRACTITIONER

## 2022-10-24 PROCEDURE — 96376 TX/PRO/DX INJ SAME DRUG ADON: CPT

## 2022-10-24 PROCEDURE — 36415 COLL VENOUS BLD VENIPUNCTURE: CPT | Performed by: NURSE PRACTITIONER

## 2022-10-24 PROCEDURE — 63600175 PHARM REV CODE 636 W HCPCS: Performed by: NURSE PRACTITIONER

## 2022-10-24 PROCEDURE — 80053 COMPREHEN METABOLIC PANEL: CPT | Performed by: NURSE PRACTITIONER

## 2022-10-24 PROCEDURE — 99284 PR EMERGENCY DEPT VISIT,LEVEL IV: ICD-10-PCS | Mod: ,,, | Performed by: NURSE PRACTITIONER

## 2022-10-24 PROCEDURE — 99285 EMERGENCY DEPT VISIT HI MDM: CPT | Mod: 25

## 2022-10-24 PROCEDURE — 99284 EMERGENCY DEPT VISIT MOD MDM: CPT | Mod: ,,, | Performed by: NURSE PRACTITIONER

## 2022-10-24 PROCEDURE — 96374 THER/PROPH/DIAG INJ IV PUSH: CPT

## 2022-10-24 RX ORDER — ONDANSETRON 2 MG/ML
4 INJECTION INTRAMUSCULAR; INTRAVENOUS ONCE
Status: COMPLETED | OUTPATIENT
Start: 2022-10-24 | End: 2022-10-24

## 2022-10-24 RX ORDER — PROMETHAZINE HYDROCHLORIDE 25 MG/1
25 TABLET ORAL EVERY 6 HOURS PRN
Qty: 15 TABLET | Refills: 0 | Status: SHIPPED | OUTPATIENT
Start: 2022-10-24 | End: 2022-12-19

## 2022-10-24 RX ORDER — ONDANSETRON 4 MG/1
4 TABLET, FILM COATED ORAL EVERY 6 HOURS
Qty: 12 TABLET | Refills: 0 | Status: SHIPPED | OUTPATIENT
Start: 2022-10-24 | End: 2022-12-19

## 2022-10-24 RX ORDER — ONDANSETRON 2 MG/ML
4 INJECTION INTRAMUSCULAR; INTRAVENOUS
Status: COMPLETED | OUTPATIENT
Start: 2022-10-24 | End: 2022-10-24

## 2022-10-24 RX ADMIN — SODIUM CHLORIDE 1000 ML: 9 INJECTION, SOLUTION INTRAVENOUS at 01:10

## 2022-10-24 RX ADMIN — ONDANSETRON HYDROCHLORIDE 4 MG: 2 SOLUTION INTRAMUSCULAR; INTRAVENOUS at 11:10

## 2022-10-24 RX ADMIN — ONDANSETRON HYDROCHLORIDE 4 MG: 2 SOLUTION INTRAMUSCULAR; INTRAVENOUS at 03:10

## 2022-10-24 RX ADMIN — SODIUM CHLORIDE 1000 ML: 9 INJECTION, SOLUTION INTRAVENOUS at 11:10

## 2022-10-24 NOTE — DISCHARGE INSTRUCTIONS
Drink plenty of fluids. Follow up with Erma Awan NP tomorrow. Return to the emergency department for any increase in symptoms or for any other new or worrisome symptoms. You need to follow up with your PCP to get your platelet count rechecked.

## 2022-10-24 NOTE — PROVIDER PROGRESS NOTES - EMERGENCY DEPT.
Encounter Date: 10/24/2022    ED Physician Progress Notes        Discussed with Erma Awan NP who has also discussed with . They recommend entyvio ASAP, and their office is working on getting it started.

## 2022-10-24 NOTE — ED PROVIDER NOTES
Encounter Date: 10/24/2022       History     Chief Complaint   Patient presents with    Diarrhea    Vomiting     66 year old female presents to the emergency department to be evaluated for diarrhea, nausea and abdominal pain that began 5 days ago. She has vomited a couple of times. She has a history of ulcerative colitis and has an appointment with her gastroenterologist tomorrow. She came to the ED requesting fluids because she feels like she may be getting dehydrated.    The history is provided by the patient.   Diarrhea   This is a recurrent problem. The current episode started several days ago. Associated symptoms include abdominal pain and vomiting. Pertinent negatives include no arthralgias, bloating, chills, coughing, fever, headaches, increased  flatus, myalgias, sweats, URI or weight loss.   Emesis   This is a new problem. Associated symptoms include abdominal pain and diarrhea. Pertinent negatives include no arthralgias, no chills, no cough, no fever, no headaches, no myalgias, no sweats and no URI.   Review of patient's allergies indicates:  No Known Allergies  Past Medical History:   Diagnosis Date    Bacterial vaginosis     Cystocele with rectocele 11/28/2007    1st degree cystocele; 2nd degree rectocele    Cystocele, midline 12/12/2007    midline    Depressive disorder 09/2004    mild    Dysmenorrhea 2006    severe first day and getting worse    Esophageal reflux     Granulation tissue 08/12/2008    5 - 8 mm long lower post repair granulation tissue - treated with sliver nitrate cautery    Hypercholesterolemia 09/20/2004    cholesterol is 219 mg/dl    Hyperlipidemia 10/26/2011    mile;  TG  71 mg/dl;  HDL  67 mg/dl;  LDL  136 mg/dl;  total cholesterol  217 mg/dl.  10/13/2017:  LDL  163 mg/dl;  total cholesterol  260 mg/dl; triglycerides  186 mg/dl;  HDL  66 mg/dl    Hypertension     Hypertriglyceridemia 10/04/2010    216 mg/dl;  [id number]    Hypertriglyceridemia 10/04/2010    216  mg/dll  02/01/2013 -  recent kevek us 238 mg/dl    IBD (inflammatory bowel disease)     Large uterus     Menorrhagia     Polymenorrhea     PONV (postoperative nausea and vomiting)     Post-hysterectomy menopause     Rectocele     Stress incontinence     Ulcerative colitis 08/2020    Ulcerative colitis confirmed by colonoscopy by Dr. Ho Capellan    Uterine prolapse     Vaginal discharge      Past Surgical History:   Procedure Laterality Date    anterior/posterior colporrhaphy with vaginal enterocele repair  06/18/2008    APPENDECTOMY      COLONOSCOPY      DILATION AND CURETTAGE OF UTERUS      [id number]    ENDOMETRIAL BIOPSY  02/16/2006    ENDOMETRIAL BIOPSY REPEAT   11/06/2007    HYSTEROSCOPY  11/28/2007    SILVER NITRATE CAUTERY OF VAGINAL WALL  08/12/2008    SIS  11/15/2007    TOT SLING  06/18/2008    TOTAL ABDOMINAL HYSTERECTOMY       Family History   Family history unknown: Yes     Social History     Tobacco Use    Smoking status: Never    Smokeless tobacco: Never   Substance Use Topics    Alcohol use: Never    Drug use: Never     Review of Systems   Constitutional:  Negative for chills, fever and weight loss.   Respiratory:  Negative for cough.    Gastrointestinal:  Positive for abdominal pain, diarrhea, nausea and vomiting. Negative for bloating and flatus.   Musculoskeletal:  Negative for arthralgias and myalgias.   Neurological:  Negative for headaches.   All other systems reviewed and are negative.    Physical Exam     Initial Vitals   BP Pulse Resp Temp SpO2   10/24/22 1057 10/24/22 1053 10/24/22 1053 10/24/22 1053 10/24/22 1053   102/64 (!) 114 16 97.8 °F (36.6 °C) 100 %      MAP       --                Physical Exam    Vitals reviewed.  Constitutional: She appears well-developed and well-nourished.   Neck: Neck supple.   Cardiovascular:  Normal rate and regular rhythm.           Pulmonary/Chest: Breath sounds normal.   Abdominal: Abdomen is soft. Bowel sounds are normal. She exhibits no distension and no mass. There is  abdominal tenderness (moderate tenderness left lower abdomen). There is no rebound and no guarding.   Musculoskeletal:         General: Normal range of motion.      Cervical back: Neck supple.     Neurological: She is alert and oriented to person, place, and time. She has normal strength. GCS score is 15. GCS eye subscore is 4. GCS verbal subscore is 5. GCS motor subscore is 6.   Skin: Skin is warm and dry. Capillary refill takes less than 2 seconds.   Psychiatric: She has a normal mood and affect.       Medical Screening Exam   See Full Note    ED Course   Procedures  Labs Reviewed   COMPREHENSIVE METABOLIC PANEL - Abnormal; Notable for the following components:       Result Value    Sodium 132 (*)     Chloride 97 (*)     Anion Gap 18 (*)     BUN 22 (*)     Creatinine 1.58 (*)     Albumin 3.4 (*)     Globulin 4.3 (*)     eGFR 36 (*)     All other components within normal limits   CBC WITH DIFFERENTIAL - Abnormal; Notable for the following components:    WBC 19.88 (*)     RBC 3.69 (*)     Hemoglobin 10.0 (*)     Hematocrit 32.1 (*)     MCHC 31.2 (*)     Platelet Count 730 (*)     MPV 9.1 (*)     Neutrophils % 81.0 (*)     Lymphocytes % 8.8 (*)     Monocytes % 7.3 (*)     Immature Granulocytes % 0.5 (*)     Neutrophils, Abs 16.12 (*)     Monocytes, Absolute 1.46 (*)     Immature Granulocytes, Absolute 0.09 (*)     All other components within normal limits   CBC W/ AUTO DIFFERENTIAL    Narrative:     The following orders were created for panel order CBC auto differential.  Procedure                               Abnormality         Status                     ---------                               -----------         ------                     CBC with Differential[[id number]]        Abnormal            Final result                 Please view results for these tests on the individual orders.          Imaging Results              CT Abdomen Pelvis  Without Contrast (Final result)  Result time 10/24/22 13:16:21       Final result by Adrien Hernandez DO (10/24/22 13:16:21)                   Impression:      There is wall thickening of the rectosigmoid as well as distal descending colon with surrounding inflammatory fat stranding and several darci-sigmoid nodular densities suspicious for lymph nodes with the largest measuring up to 1.1 cm. Findings are suspicious for sequela of colitis. Recommend follow-up endoscopy to exclude neoplasm.  Small hiatal hernia.  Prior hysterectomy.  Other/detailed findings as above.    The CT exam was performed using one or more of the following dose    reduction techniques- Automated exposure control, adjustment of the mA    and/or kV according to patient size, and/or use of iterative    reconstructed technique.    Point of Service: [facility]      Electronically signed by: Adrien Hernandez  Date:    10/24/2022  Time:    13:16               Narrative:    EXAMINATION:  CT ABDOMEN PELVIS WITHOUT CONTRAST    CLINICAL HISTORY:  abdominal pain;    COMPARISON:  None    TECHNIQUE:  Multiple axial tomographic images of the abdomen and pelvis were obtained without the use of intravenous contrast.    FINDINGS:  Mild dependent changes of the lungs noted.    No worrisome focal hepatic abnormality demonstrated on submitted images.  Visualized gallbladder grossly unremarkable.  Visualized pancreas appears unremarkable.  Spleen grossly unremarkable.    Bilateral adrenal glands grossly unremarkable.  Bilateral kidneys appear grossly unremarkable.  Urinary bladder incompletely distended.  Prior hysterectomy.    There is wall thickening of the rectosigmoid as well as distal descending colon with surrounding inflammatory fat stranding and several darci-sigmoid nodular densities suspicious for lymph nodes with the largest measuring up to 1.1 cm.  Findings are suspicious for sequela of colitis.  Recommend follow-up endoscopy to exclude neoplasm.  Small hiatal hernia.  Vasculature grossly unremarkable.  Scattered  skeletal degenerative change.  Study limited secondary to lack of intravenous and oral contrast.                                       X-Ray Abdomen Flat And Erect (Final result)  Result time 10/24/22 11:37:48      Final result by Adrien Hernandez DO (10/24/22 11:37:48)                   Impression:      Nonspecific bowel gas pattern.  Suspect cholelithiasis.  Consider right upper quadrant ultrasound.    Point of Service: [facility]      Electronically signed by: Adrien Hernandez  Date:    10/24/2022  Time:    11:37               Narrative:    EXAMINATION:  XR ABDOMEN FLAT AND ERECT    CLINICAL HISTORY:  Unspecified abdominal pain    COMPARISON:  None    TECHNIQUE:  Frontal views of the abdomen in the supine and upright position.    FINDINGS:  Nonspecific nonobstructive bowel gas pattern.  No free intraperitoneal air demonstrated.  Visualized osseous and surrounding soft tissue structures demonstrate no acute abnormality.  Right upper quadrant calcifications suspicious for cholelithiasis.                                       Medications   ondansetron injection 4 mg (has no administration in time range)   sodium chloride 0.9% bolus 1,000 mL (0 mLs Intravenous Stopped 10/24/22 1411)   ondansetron injection 4 mg (4 mg Intravenous Given 10/24/22 1122)   sodium chloride 0.9% bolus 1,000 mL (0 mLs Intravenous Stopped 10/24/22 1552)                       Clinical Impression:   Final diagnoses:  [R10.9] Abdominal pain  [K51.919] Ulcerative colitis with complication, unspecified location (Primary)      ED Disposition Condition    Discharge Stable          ED Prescriptions       Medication Sig Dispense Start Date End Date Auth. Provider    ondansetron (ZOFRAN) 4 MG tablet Take 1 tablet (4 mg total) by mouth every 6 (six) hours. 12 tablet 10/24/2022 -- SAUL Bob    promethazine (PHENERGAN) 25 MG tablet Take 1 tablet (25 mg total) by mouth every 6 (six) hours as needed for Nausea. 15 tablet 10/24/2022 --  Rosanna Hebert, SAUL          Follow-up Information    None          Rosanna Hebert, SAUL  10/24/22 1552       Rosanna Hebert, SAUL  10/24/22 155

## 2022-10-25 ENCOUNTER — OFFICE VISIT (OUTPATIENT)
Dept: GASTROENTEROLOGY | Facility: CLINIC | Age: 67
End: 2022-10-25
Payer: MEDICARE

## 2022-10-25 VITALS — BODY MASS INDEX: 25.36 KG/M2 | WEIGHT: 166.81 LBS

## 2022-10-25 DIAGNOSIS — K51.919 ULCERATIVE COLITIS WITH COMPLICATION, UNSPECIFIED LOCATION: Primary | ICD-10-CM

## 2022-10-25 PROCEDURE — 99214 OFFICE O/P EST MOD 30 MIN: CPT | Mod: ,,, | Performed by: NURSE PRACTITIONER

## 2022-10-25 PROCEDURE — 99214 PR OFFICE/OUTPT VISIT, EST, LEVL IV, 30-39 MIN: ICD-10-PCS | Mod: ,,, | Performed by: NURSE PRACTITIONER

## 2022-10-25 NOTE — PROGRESS NOTES
Pt is a 67 y/o WF here for 1 day f/u after ER visit for UC flare- abd cramping, sludgy bloody stool, denies diarrhea. Awaiting Entyvio start. WBC 19.88 and fever every night up to 101 F. Last calprotectin >3000 on 6/30/22. Failed mesalamine, had to stop Zeposia earlier this yr 2/2 ankle swelling, tachycardia.    Colonoscopy 9/19/21: Normal terminal ileum. Erythema in the sigmoid and rectum. Random TI and colon biopsies obtained. Moderate right and left sided diverticulosis.  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    States her first flare was in 2020 when she was diagnosed, then one more in 2021.   States she has tried ASA enemas, and Asacol. States she had body aches, low grade fever,  and persistent blood in stools.     Past Medical History:   Diagnosis Date    Bacterial vaginosis     Cystocele with rectocele 11/28/2007    1st degree cystocele; 2nd degree rectocele    Cystocele, midline 12/12/2007    midline    Depressive disorder 09/2004    mild    Dysmenorrhea 2006    severe first day and getting worse    Esophageal reflux     Granulation tissue 08/12/2008    5 - 8 mm long lower post repair granulation tissue - treated with sliver nitrate cautery    Hypercholesterolemia 09/20/2004    cholesterol is 219 mg/dl    Hyperlipidemia 10/26/2011    mile;  TG  71 mg/dl;  HDL  67 mg/dl;  LDL  136 mg/dl;  total cholesterol  217 mg/dl.  10/13/2017:  LDL  163 mg/dl;  total cholesterol  260 mg/dl; triglycerides  186 mg/dl;  HDL  66 mg/dl    Hypertension     Hypertriglyceridemia 10/04/2010    216 mg/dl;  [id number]    Hypertriglyceridemia 10/04/2010    216  mg/dll  02/01/2013 - recent kevek us 238 mg/dl    IBD (inflammatory bowel disease)     Large uterus     Menorrhagia     Polymenorrhea     PONV (postoperative nausea and vomiting)     Post-hysterectomy menopause     Rectocele     Stress incontinence     Ulcerative colitis  08/2020    Ulcerative colitis confirmed by colonoscopy by Dr. Ho Capellan    Uterine prolapse     Vaginal discharge      Review of Systems   Constitutional:  Negative for chills and fever.   Respiratory:  Negative for shortness of breath.    Cardiovascular:  Negative for chest pain.   Gastrointestinal:  Positive for abdominal pain, blood in stool, nausea and vomiting. Negative for constipation, diarrhea and melena.   Skin:  Negative for rash.   Neurological:  Negative for weakness.     Physical Exam  Vitals reviewed. Exam conducted with a chaperone present.   Constitutional:       General: She is not in acute distress.     Appearance: Normal appearance.   HENT:      Head: Normocephalic.   Eyes:      General: No scleral icterus.     Pupils: Pupils are equal, round, and reactive to light.   Cardiovascular:      Rate and Rhythm: Normal rate.   Pulmonary:      Effort: Pulmonary effort is normal.   Abdominal:      General: There is no distension.      Palpations: Abdomen is soft.      Tenderness: There is no abdominal tenderness. There is no guarding or rebound.   Skin:     General: Skin is warm and dry.   Neurological:      General: No focal deficit present.      Mental Status: She is alert and oriented to person, place, and time. Mental status is at baseline.   Psychiatric:         Mood and Affect: Mood normal.         Behavior: Behavior normal.         Thought Content: Thought content normal.         Judgment: Judgment normal.     Plan  - last colonoscopy with some mild to moderate right sided inflammation  - ESR normal, CRP wnl at last check, and calprotectin >3000  - discussed options available for treatment without S1PR antagonist or 5 ASA     - neg QuantGOLD given prior indeterminate results              - will start on Entivyo  - HepB serologies negative     - Anemia  - iron studies checked at last visit - ferritin 5, iron 15  - restarted PO iron at this time     - denies any new rashes, joint pains, change  in vision - gets yearly ophthalmology visits & has pending DEXA with PCP/OBGYN     - states last VitD level was normal; last DEXA normal  - UTD with Pap smears  Tobacco cessation:   Patient not a smoker, counseled not to start     - RTC 3 months

## 2022-10-30 ENCOUNTER — HOSPITAL ENCOUNTER (EMERGENCY)
Facility: HOSPITAL | Age: 67
Discharge: HOME | End: 2022-10-30
Payer: MEDICARE

## 2022-10-30 VITALS
TEMPERATURE: 100 F | DIASTOLIC BLOOD PRESSURE: 68 MMHG | SYSTOLIC BLOOD PRESSURE: 119 MMHG | HEIGHT: 68 IN | BODY MASS INDEX: 23.79 KG/M2 | HEART RATE: 110 BPM | RESPIRATION RATE: 18 BRPM | WEIGHT: 157 LBS | OXYGEN SATURATION: 95 %

## 2022-10-30 DIAGNOSIS — K51.818 OTHER ULCERATIVE COLITIS WITH OTHER COMPLICATION: Primary | ICD-10-CM

## 2022-10-30 DIAGNOSIS — K12.0 APHTHOUS ULCER OF MOUTH: ICD-10-CM

## 2022-10-30 LAB
ALBUMIN SERPL BCP-MCNC: 2.6 G/DL (ref 3.5–5)
ALBUMIN/GLOB SERPL: 0.7 {RATIO}
ALP SERPL-CCNC: 108 U/L (ref 55–142)
ALT SERPL W P-5'-P-CCNC: 39 U/L (ref 13–56)
ANION GAP SERPL CALCULATED.3IONS-SCNC: 21 MMOL/L (ref 7–16)
AST SERPL W P-5'-P-CCNC: 45 U/L (ref 15–37)
BASOPHILS # BLD AUTO: 0.03 K/UL (ref 0–0.2)
BASOPHILS NFR BLD AUTO: 0.3 % (ref 0–1)
BILIRUB SERPL-MCNC: 0.4 MG/DL (ref ?–1.2)
BUN SERPL-MCNC: 12 MG/DL (ref 7–18)
BUN/CREAT SERPL: 20 (ref 6–20)
CALCIUM SERPL-MCNC: 8.6 MG/DL (ref 8.5–10.1)
CHLORIDE SERPL-SCNC: 98 MMOL/L (ref 98–107)
CO2 SERPL-SCNC: 24 MMOL/L (ref 21–32)
CREAT SERPL-MCNC: 0.6 MG/DL (ref 0.55–1.02)
DIFFERENTIAL METHOD BLD: ABNORMAL
EGFR (NO RACE VARIABLE) (RUSH/TITUS): 100 ML/MIN/1.73M²
EOSINOPHIL # BLD AUTO: 0.15 K/UL (ref 0–0.5)
EOSINOPHIL NFR BLD AUTO: 1.3 % (ref 1–4)
ERYTHROCYTE [DISTWIDTH] IN BLOOD BY AUTOMATED COUNT: 14.5 % (ref 11.5–14.5)
GLOBULIN SER-MCNC: 4 G/DL (ref 2–4)
GLUCOSE SERPL-MCNC: 96 MG/DL (ref 74–106)
HCT VFR BLD AUTO: 27.1 % (ref 38–47)
HGB BLD-MCNC: 8.5 G/DL (ref 12–16)
IMM GRANULOCYTES # BLD AUTO: 0.08 K/UL (ref 0–0.04)
IMM GRANULOCYTES NFR BLD: 0.7 % (ref 0–0.4)
LYMPHOCYTES # BLD AUTO: 0.87 K/UL (ref 1–4.8)
LYMPHOCYTES NFR BLD AUTO: 7.3 % (ref 27–41)
MCH RBC QN AUTO: 27 PG (ref 27–31)
MCHC RBC AUTO-ENTMCNC: 31.4 G/DL (ref 32–36)
MCV RBC AUTO: 86 FL (ref 80–96)
MONOCYTES # BLD AUTO: 1.32 K/UL (ref 0–0.8)
MONOCYTES NFR BLD AUTO: 11 % (ref 2–6)
MPC BLD CALC-MCNC: 8.9 FL (ref 9.4–12.4)
NEUTROPHILS # BLD AUTO: 9.54 K/UL (ref 1.8–7.7)
NEUTROPHILS NFR BLD AUTO: 79.4 % (ref 53–65)
NRBC # BLD AUTO: 0 X10E3/UL
NRBC, AUTO (.00): 0 %
PLATELET # BLD AUTO: 702 K/UL (ref 150–400)
POC OCCULT BLOOD STOOL: POSITIVE
POTASSIUM SERPL-SCNC: 4.9 MMOL/L (ref 3.5–5.1)
PROT SERPL-MCNC: 6.6 G/DL (ref 6.4–8.2)
RBC # BLD AUTO: 3.15 M/UL (ref 4.2–5.4)
SODIUM SERPL-SCNC: 138 MMOL/L (ref 136–145)
WBC # BLD AUTO: 11.99 K/UL (ref 4.5–11)

## 2022-10-30 PROCEDURE — 80053 COMPREHEN METABOLIC PANEL: CPT | Performed by: NURSE PRACTITIONER

## 2022-10-30 PROCEDURE — 85025 COMPLETE CBC W/AUTO DIFF WBC: CPT | Performed by: NURSE PRACTITIONER

## 2022-10-30 PROCEDURE — C9113 INJ PANTOPRAZOLE SODIUM, VIA: HCPCS | Performed by: NURSE PRACTITIONER

## 2022-10-30 PROCEDURE — 96374 THER/PROPH/DIAG INJ IV PUSH: CPT

## 2022-10-30 PROCEDURE — 25000003 PHARM REV CODE 250: Performed by: NURSE PRACTITIONER

## 2022-10-30 PROCEDURE — 99284 PR EMERGENCY DEPT VISIT,LEVEL IV: ICD-10-PCS | Mod: ,,, | Performed by: NURSE PRACTITIONER

## 2022-10-30 PROCEDURE — 96361 HYDRATE IV INFUSION ADD-ON: CPT

## 2022-10-30 PROCEDURE — 63600175 PHARM REV CODE 636 W HCPCS: Performed by: NURSE PRACTITIONER

## 2022-10-30 PROCEDURE — 96375 TX/PRO/DX INJ NEW DRUG ADDON: CPT

## 2022-10-30 PROCEDURE — 36415 COLL VENOUS BLD VENIPUNCTURE: CPT | Performed by: NURSE PRACTITIONER

## 2022-10-30 PROCEDURE — 99284 EMERGENCY DEPT VISIT MOD MDM: CPT

## 2022-10-30 PROCEDURE — 82272 OCCULT BLD FECES 1-3 TESTS: CPT

## 2022-10-30 PROCEDURE — 99284 EMERGENCY DEPT VISIT MOD MDM: CPT | Mod: ,,, | Performed by: NURSE PRACTITIONER

## 2022-10-30 RX ORDER — MORPHINE SULFATE 2 MG/ML
2 INJECTION, SOLUTION INTRAMUSCULAR; INTRAVENOUS
Status: COMPLETED | OUTPATIENT
Start: 2022-10-30 | End: 2022-10-30

## 2022-10-30 RX ORDER — METHYLPREDNISOLONE SOD SUCC 125 MG
125 VIAL (EA) INJECTION
Status: COMPLETED | OUTPATIENT
Start: 2022-10-30 | End: 2022-10-30

## 2022-10-30 RX ORDER — PREDNISONE 20 MG/1
40 TABLET ORAL DAILY
Qty: 10 TABLET | Refills: 0 | Status: SHIPPED | OUTPATIENT
Start: 2022-10-30 | End: 2022-11-04

## 2022-10-30 RX ORDER — LIDOCAINE HYDROCHLORIDE 20 MG/ML
SOLUTION OROPHARYNGEAL EVERY 6 HOURS PRN
Qty: 100 ML | Refills: 1 | Status: SHIPPED | OUTPATIENT
Start: 2022-10-30 | End: 2022-12-19

## 2022-10-30 RX ORDER — PANTOPRAZOLE SODIUM 40 MG/10ML
40 INJECTION, POWDER, LYOPHILIZED, FOR SOLUTION INTRAVENOUS
Status: COMPLETED | OUTPATIENT
Start: 2022-10-30 | End: 2022-10-30

## 2022-10-30 RX ORDER — ONDANSETRON 2 MG/ML
4 INJECTION INTRAMUSCULAR; INTRAVENOUS
Status: COMPLETED | OUTPATIENT
Start: 2022-10-30 | End: 2022-10-30

## 2022-10-30 RX ORDER — HYDROCODONE BITARTRATE AND ACETAMINOPHEN 7.5; 325 MG/1; MG/1
TABLET ORAL
Qty: 12 TABLET | Refills: 0 | Status: SHIPPED | OUTPATIENT
Start: 2022-10-30 | End: 2022-12-19

## 2022-10-30 RX ADMIN — METHYLPREDNISOLONE SODIUM SUCCINATE 125 MG: 125 INJECTION, POWDER, FOR SOLUTION INTRAMUSCULAR; INTRAVENOUS at 03:10

## 2022-10-30 RX ADMIN — SODIUM CHLORIDE 1000 ML: 9 INJECTION, SOLUTION INTRAVENOUS at 02:10

## 2022-10-30 RX ADMIN — ONDANSETRON HYDROCHLORIDE 4 MG: 2 SOLUTION INTRAMUSCULAR; INTRAVENOUS at 03:10

## 2022-10-30 RX ADMIN — PANTOPRAZOLE SODIUM 40 MG: 40 INJECTION, POWDER, FOR SOLUTION INTRAVENOUS at 03:10

## 2022-10-30 RX ADMIN — MORPHINE SULFATE 2 MG: 2 INJECTION, SOLUTION INTRAMUSCULAR; INTRAVENOUS at 03:10

## 2022-10-30 NOTE — ED PROVIDER NOTES
Encounter Date: 10/30/2022       History     Chief Complaint   Patient presents with    Ulcerative Colitis     66-year-old female presents ambulatory to the emergency department with history of fever that began 2 weeks ago.  Also nausea with vomiting and diarrhea.  Diarrhea has been bloody.  She does have a history of ulcerative colitis.  States is not thrown up in the last 2 days.  She does feel weak and.  She feels she is having muscle spasms as well.  Also oral ulcerations noted.  Approximately 5 mm ulceration noted on the tip of her tongue.  No other located in the left cheek of her mouth.    Review of patient's allergies indicates:  No Known Allergies  Past Medical History:   Diagnosis Date    Bacterial vaginosis     Cystocele with rectocele 11/28/2007    1st degree cystocele; 2nd degree rectocele    Cystocele, midline 12/12/2007    midline    Depressive disorder 09/2004    mild    Dysmenorrhea 2006    severe first day and getting worse    Esophageal reflux     Granulation tissue 08/12/2008    5 - 8 mm long lower post repair granulation tissue - treated with sliver nitrate cautery    Hypercholesterolemia 09/20/2004    cholesterol is 219 mg/dl    Hyperlipidemia 10/26/2011    mile;  TG  71 mg/dl;  HDL  67 mg/dl;  LDL  136 mg/dl;  total cholesterol  217 mg/dl.  10/13/2017:  LDL  163 mg/dl;  total cholesterol  260 mg/dl; triglycerides  186 mg/dl;  HDL  66 mg/dl    Hypertension     Hypertriglyceridemia 10/04/2010    216 mg/dl;  [id number]    Hypertriglyceridemia 10/04/2010    216  mg/dll  02/01/2013 - recent kevek us 238 mg/dl    IBD (inflammatory bowel disease)     Large uterus     Menorrhagia     Polymenorrhea     PONV (postoperative nausea and vomiting)     Post-hysterectomy menopause     Rectocele     Stress incontinence     Ulcerative colitis 08/2020    Ulcerative colitis confirmed by colonoscopy by Dr. Ho Capellan    Uterine prolapse     Vaginal discharge      Past Surgical History:   Procedure Laterality  Date    anterior/posterior colporrhaphy with vaginal enterocele repair  06/18/2008    APPENDECTOMY      COLONOSCOPY      DILATION AND CURETTAGE OF UTERUS      [id number]    ENDOMETRIAL BIOPSY  02/16/2006    ENDOMETRIAL BIOPSY REPEAT   11/06/2007    HYSTEROSCOPY  11/28/2007    SILVER NITRATE CAUTERY OF VAGINAL WALL  08/12/2008    SIS  11/15/2007    TOT SLING  06/18/2008    TOTAL ABDOMINAL HYSTERECTOMY       Family History   Family history unknown: Yes     Social History     Tobacco Use    Smoking status: Never    Smokeless tobacco: Never   Substance Use Topics    Alcohol use: Never    Drug use: Never     Review of Systems   Constitutional:  Positive for chills, fatigue and fever. Negative for activity change.   HENT:  Positive for sore throat.    Respiratory:  Negative for cough, chest tightness and shortness of breath.    Cardiovascular:  Negative for chest pain, palpitations and leg swelling.   Gastrointestinal:  Positive for abdominal pain, blood in stool, diarrhea, nausea and vomiting. Negative for abdominal distention and constipation.   Genitourinary:  Negative for difficulty urinating, dysuria, flank pain and urgency.   Musculoskeletal:  Positive for arthralgias. Negative for back pain, gait problem and joint swelling.   Neurological:  Positive for weakness. Negative for dizziness, syncope, light-headedness and headaches.     Physical Exam     Initial Vitals [10/30/22 1405]   BP Pulse Resp Temp SpO2   101/62 (!) 132 20 99.6 °F (37.6 °C) 98 %      MAP       --         Physical Exam    Constitutional: She appears well-developed and well-nourished.   HENT:   Head: Normocephalic and atraumatic.   Eyes: Conjunctivae are normal.   Neck: Neck supple. No JVD present.   Cardiovascular:  Regular rhythm, normal heart sounds and intact distal pulses.           Pulmonary/Chest: Breath sounds normal. No stridor.   Abdominal: Abdomen is soft. Bowel sounds are normal. There is abdominal tenderness.   Generalized abdominal  tenderness without rebound   Musculoskeletal:         General: Tenderness present.      Cervical back: Neck supple.     Neurological: She is alert and oriented to person, place, and time. GCS score is 15. GCS eye subscore is 4. GCS verbal subscore is 5. GCS motor subscore is 6.   Skin: Skin is warm and dry. Capillary refill takes less than 2 seconds.   Psychiatric: She has a normal mood and affect. Thought content normal.       Medical Screening Exam   See Full Note    ED Course   Procedures  Labs Reviewed   COMPREHENSIVE METABOLIC PANEL - Abnormal; Notable for the following components:       Result Value    Anion Gap 21 (*)     Albumin 2.6 (*)     AST 45 (*)     All other components within normal limits   CBC WITH DIFFERENTIAL - Abnormal; Notable for the following components:    WBC 11.99 (*)     RBC 3.15 (*)     Hemoglobin 8.5 (*)     Hematocrit 27.1 (*)     MCHC 31.4 (*)     Platelet Count 702 (*)     MPV 8.9 (*)     Neutrophils % 79.4 (*)     Lymphocytes % 7.3 (*)     Monocytes % 11.0 (*)     Immature Granulocytes % 0.7 (*)     Neutrophils, Abs 9.54 (*)     Lymphocytes, Absolute 0.87 (*)     Monocytes, Absolute 1.32 (*)     Immature Granulocytes, Absolute 0.08 (*)     All other components within normal limits   CBC W/ AUTO DIFFERENTIAL    Narrative:     The following orders were created for panel order CBC auto differential.  Procedure                               Abnormality         Status                     ---------                               -----------         ------                     CBC with Differential[[id number]]        Abnormal            Final result                 Please view results for these tests on the individual orders.   URINALYSIS, REFLEX TO URINE CULTURE   EXTRA TUBES    Narrative:     The following orders were created for panel order EXTRA TUBES.  Procedure                               Abnormality         Status                     ---------                               -----------          ------                     Light Green Top Hold[[id number]]                             In process                 Pink Top Hold[[id number]]                                                                 Please view results for these tests on the individual orders.   LIGHT GREEN TOP HOLD   POCT OCCULT BLOOD (STOOL)          Imaging Results    None          Medications   sodium chloride 0.9% bolus 1,000 mL (1,000 mLs Intravenous New Bag 10/30/22 1438)   morphine injection 2 mg (2 mg Intravenous Given 10/30/22 1517)   ondansetron injection 4 mg (4 mg Intravenous Given 10/30/22 1517)   pantoprazole injection 40 mg (40 mg Intravenous Given 10/30/22 1516)   methylPREDNISolone sodium succinate injection 125 mg (125 mg Intravenous Given 10/30/22 1516)                 ED Course as of 10/30/22 1558   Sun Oct 30, 2022   1547 Patient reports having a sore on labia.  Chaperone present.  She has a 5 mm full-thickness ulceration on left labia without sign or symptom of infection.  No drainage noted. [CM]      ED Course User Index  [CM] SAUL Everett          Clinical Impression:   Final diagnoses:  [K51.818] Other ulcerative colitis with other complication (Primary)  [K12.0] Aphthous ulcer of mouth      ED Disposition Condition    Discharge Stable          ED Prescriptions       Medication Sig Dispense Start Date End Date Auth. Provider    predniSONE (DELTASONE) 20 MG tablet Take 2 tablets (40 mg total) by mouth once daily. for 5 days 10 tablet 10/30/2022 11/4/2022 SAUL Everett    LIDOcaine HCl 2% (XYLOCAINE) 2 % Soln by Mucous Membrane route every 6 (six) hours as needed (Mouth ulcers). 100 mL 10/30/2022 -- SAUL Everett    HYDROcodone-acetaminophen (NORCO) 7.5-325 mg per tablet 1/2-1 p.o. Q 6 hours p.r.n. ulcer pain 12 tablet 10/30/2022 -- SAUL Everett          Follow-up Information       Follow up With Specialties Details Why Contact Info    Jack Swift MD Gynecology Call in 1  day  [address]  [facility]  [place] [address]  [phone]               Edwardo Munguia, ANGELICAP  10/30/22 [id number]

## 2022-10-30 NOTE — DISCHARGE INSTRUCTIONS
Mix viscous xylocaine in a 1:1: 1 ratio of Mylanta time and liquid Benadryl.  Swish and spit QA see and at HS as needed.  Medications as labeled.  Call primary care provider in the a.m. 10/31/2022.

## 2022-10-31 ENCOUNTER — PATIENT MESSAGE (OUTPATIENT)
Dept: GASTROENTEROLOGY | Facility: CLINIC | Age: 67
End: 2022-10-31
Payer: MEDICARE

## 2022-11-03 ENCOUNTER — TELEPHONE (OUTPATIENT)
Dept: GASTROENTEROLOGY | Facility: CLINIC | Age: 67
End: 2022-11-03
Payer: MEDICARE

## 2022-11-03 ENCOUNTER — PATIENT MESSAGE (OUTPATIENT)
Dept: GASTROENTEROLOGY | Facility: CLINIC | Age: 67
End: 2022-11-03
Payer: MEDICARE

## 2022-11-03 RX ORDER — SODIUM CHLORIDE 0.9 % (FLUSH) 0.9 %
10 SYRINGE (ML) INJECTION
Status: CANCELLED | OUTPATIENT
Start: 2022-11-04

## 2022-11-03 RX ORDER — DIPHENHYDRAMINE HYDROCHLORIDE 50 MG/ML
25 INJECTION INTRAMUSCULAR; INTRAVENOUS
Status: CANCELLED | OUTPATIENT
Start: 2022-11-04

## 2022-11-03 RX ORDER — METHYLPREDNISOLONE SOD SUCC 125 MG
40 VIAL (EA) INJECTION
Status: CANCELLED | OUTPATIENT
Start: 2022-11-04

## 2022-11-03 RX ORDER — CETIRIZINE HYDROCHLORIDE 10 MG/1
10 TABLET ORAL ONCE
Status: CANCELLED | OUTPATIENT
Start: 2022-11-04 | End: 2022-11-04

## 2022-11-03 RX ORDER — ACETAMINOPHEN 325 MG/1
650 TABLET ORAL ONCE
Status: CANCELLED | OUTPATIENT
Start: 2022-11-04 | End: 2022-11-04

## 2022-11-03 RX ORDER — IPRATROPIUM BROMIDE AND ALBUTEROL SULFATE 2.5; .5 MG/3ML; MG/3ML
3 SOLUTION RESPIRATORY (INHALATION)
Status: CANCELLED | OUTPATIENT
Start: 2022-11-04

## 2022-11-03 RX ORDER — EPINEPHRINE 1 MG/ML
0.3 INJECTION, SOLUTION, CONCENTRATE INTRAVENOUS
Status: CANCELLED | OUTPATIENT
Start: 2022-11-04

## 2022-11-03 RX ORDER — ACETAMINOPHEN 325 MG/1
650 TABLET ORAL
Status: CANCELLED | OUTPATIENT
Start: 2022-11-04

## 2022-11-03 NOTE — TELEPHONE ENCOUNTER
Called patient to notify her that I spoke with Landen Hurley who is over the infusion center about getting her Entyvio infusions ordered, since she cannot go through [facility] due to having medicare part B. Informed her that the infusion center will put in the order and once pre cert gets the medication approved they will give her a call to schedule her infusions. Patient verbalized understanding.

## 2022-11-14 ENCOUNTER — TELEPHONE (OUTPATIENT)
Dept: GASTROENTEROLOGY | Facility: CLINIC | Age: 67
End: 2022-11-14
Payer: MEDICARE

## 2022-11-14 NOTE — TELEPHONE ENCOUNTER
Spoke with Landen Maldonado at infusion center to check on status of patients Entyvio. States patient is set up for her first infusion on 11/22/22.

## 2022-11-14 NOTE — TELEPHONE ENCOUNTER
Spoke with Sofia at [facility]. States they were checking PA for Entyvio. Informed her that we were told that she couldn't go through [facility] due to her medicare B, so we were setting her up through the infusion center here at [facility]. Verbalized understanding.            ----- Message from Yasmin Muñiz sent at 11/14/2022 11:40 AM CST -----  [facility] called checking on a PA. Patient of Shane..

## 2022-11-15 ENCOUNTER — TELEPHONE (OUTPATIENT)
Dept: GASTROENTEROLOGY | Facility: CLINIC | Age: 67
End: 2022-11-15
Payer: MEDICARE

## 2022-11-15 NOTE — TELEPHONE ENCOUNTER
Patient called asking if her iron levels could be checked due to fatigue and sob on exertion she has been experiencing over the past week. She states she has been unable to tolerate her oral iron since having a UC flare. Informed patient that I would speak with Erma Awan NP when she returns to clinic in the morning. Verbalized understanding.

## 2022-11-16 ENCOUNTER — PATIENT MESSAGE (OUTPATIENT)
Dept: GASTROENTEROLOGY | Facility: CLINIC | Age: 67
End: 2022-11-16
Payer: MEDICARE

## 2022-11-16 DIAGNOSIS — D50.0 IRON DEFICIENCY ANEMIA DUE TO CHRONIC BLOOD LOSS: Primary | ICD-10-CM

## 2022-11-16 DIAGNOSIS — K51.919 ULCERATIVE COLITIS WITH COMPLICATION, UNSPECIFIED LOCATION: ICD-10-CM

## 2022-11-18 ENCOUNTER — TELEPHONE (OUTPATIENT)
Dept: GASTROENTEROLOGY | Facility: CLINIC | Age: 67
End: 2022-11-18
Payer: MEDICARE

## 2022-11-18 DIAGNOSIS — D50.0 IRON DEFICIENCY ANEMIA DUE TO CHRONIC BLOOD LOSS: Primary | ICD-10-CM

## 2022-11-18 RX ORDER — HYDROCODONE BITARTRATE AND ACETAMINOPHEN 500; 5 MG/1; MG/1
TABLET ORAL ONCE
Status: CANCELLED | OUTPATIENT
Start: 2022-11-18 | End: 2022-11-18

## 2022-11-18 NOTE — TELEPHONE ENCOUNTER
Spoke with outpatient infusion center to set up blood transfusion. States that the patient is scheduled for her Entyvio infusion for Tuesday and did Erma Awan NP want her to have the blood instead on that day, or did she want to have the blood transfusion Monday then Entyvio on Tuesday due to not being able to have both on the same day. Informed nurse that I would speak with Erma Awan NP and let her know.

## 2022-11-21 ENCOUNTER — INFUSION (OUTPATIENT)
Dept: INFUSION THERAPY | Facility: HOSPITAL | Age: 67
End: 2022-11-21
Attending: OBSTETRICS & GYNECOLOGY
Payer: MEDICARE

## 2022-11-21 VITALS
RESPIRATION RATE: 20 BRPM | SYSTOLIC BLOOD PRESSURE: 123 MMHG | TEMPERATURE: 98 F | HEART RATE: 107 BPM | OXYGEN SATURATION: 100 % | DIASTOLIC BLOOD PRESSURE: 75 MMHG

## 2022-11-21 DIAGNOSIS — D50.0 IRON DEFICIENCY ANEMIA DUE TO CHRONIC BLOOD LOSS: ICD-10-CM

## 2022-11-21 LAB
ABO + RH BLD: NORMAL
BLD PROD TYP BPU: NORMAL
BLOOD UNIT EXPIRATION DATE: NORMAL
BLOOD UNIT TYPE CODE: 6200
CROSSMATCH INTERPRETATION: NORMAL
DISPENSE STATUS: NORMAL
INDIRECT COOMBS: NORMAL
RH BLD: NORMAL
UNIT NUMBER: NORMAL

## 2022-11-21 PROCEDURE — 36430 TRANSFUSION BLD/BLD COMPNT: CPT

## 2022-11-21 PROCEDURE — 25000003 PHARM REV CODE 250: Performed by: NURSE PRACTITIONER

## 2022-11-21 PROCEDURE — 86923 COMPATIBILITY TEST ELECTRIC: CPT | Performed by: NURSE PRACTITIONER

## 2022-11-21 PROCEDURE — 86850 RBC ANTIBODY SCREEN: CPT | Performed by: NURSE PRACTITIONER

## 2022-11-21 PROCEDURE — 36415 COLL VENOUS BLD VENIPUNCTURE: CPT

## 2022-11-21 PROCEDURE — P9016 RBC LEUKOCYTES REDUCED: HCPCS | Performed by: NURSE PRACTITIONER

## 2022-11-21 RX ORDER — HYDROCODONE BITARTRATE AND ACETAMINOPHEN 500; 5 MG/1; MG/1
TABLET ORAL ONCE
Status: COMPLETED | OUTPATIENT
Start: 2022-11-21 | End: 2022-11-21

## 2022-11-21 RX ADMIN — SODIUM CHLORIDE: 9 INJECTION, SOLUTION INTRAVENOUS at 09:11

## 2022-11-21 NOTE — PROGRESS NOTES
0800: Pt here blood released for lab.  0810: Blood drawn and sent to lab for T&S.  Lab notified of blood and sent via tubes.  0910: blood ready today and verified with second nurse.  0915: Blood infusing at present voices no needs. Reminded patient of adverse reactions to notify nurse if any. Voiced understanding.  1000: Pt is doing well per her words. No needs at present. Will continue to monitor.  1030: Voices no needs a present. Will continue to monitor.  1100: Pt talking on her phone at present. Will cont to monitor.  1145: VSS asked pt if she needed anything to drink or eat. States she is fine and does not need anything.  1158: PRBC complete. Voices no needs at present. No sxs if adverse reactions. Pt states she is to return tomorrow for her entivyo infusion.  1218: Dc'd #20 ga IV cath pt stated she did not want to go home with her INT today and she would just rather have it restarted in AM when she comes for next infusion. Dc'd home ambul

## 2022-11-22 ENCOUNTER — INFUSION (OUTPATIENT)
Dept: INFUSION THERAPY | Facility: HOSPITAL | Age: 67
End: 2022-11-22
Attending: NURSE PRACTITIONER
Payer: MEDICARE

## 2022-11-22 ENCOUNTER — OFFICE VISIT (OUTPATIENT)
Dept: OTOLARYNGOLOGY | Facility: CLINIC | Age: 67
End: 2022-11-22
Payer: MEDICARE

## 2022-11-22 VITALS — BODY MASS INDEX: 23.34 KG/M2 | WEIGHT: 154 LBS | HEIGHT: 68 IN

## 2022-11-22 VITALS
OXYGEN SATURATION: 96 % | WEIGHT: 154 LBS | DIASTOLIC BLOOD PRESSURE: 82 MMHG | SYSTOLIC BLOOD PRESSURE: 119 MMHG | HEART RATE: 97 BPM | RESPIRATION RATE: 19 BRPM | TEMPERATURE: 99 F | HEIGHT: 68 IN | BODY MASS INDEX: 23.34 KG/M2

## 2022-11-22 DIAGNOSIS — Z96.652 H/O TOTAL KNEE REPLACEMENT, LEFT: Primary | ICD-10-CM

## 2022-11-22 DIAGNOSIS — D50.0 IRON DEFICIENCY ANEMIA DUE TO CHRONIC BLOOD LOSS: Primary | ICD-10-CM

## 2022-11-22 DIAGNOSIS — J34.0 CELLULITIS OF EXTERNAL NOSE: Primary | ICD-10-CM

## 2022-11-22 DIAGNOSIS — R59.1 LYMPHADENOPATHY: ICD-10-CM

## 2022-11-22 PROCEDURE — 63600175 PHARM REV CODE 636 W HCPCS: Mod: JG | Performed by: NURSE PRACTITIONER

## 2022-11-22 PROCEDURE — 96365 THER/PROPH/DIAG IV INF INIT: CPT

## 2022-11-22 PROCEDURE — 25000003 PHARM REV CODE 250: Performed by: NURSE PRACTITIONER

## 2022-11-22 PROCEDURE — 99204 OFFICE O/P NEW MOD 45 MIN: CPT | Mod: S$PBB,,, | Performed by: OTOLARYNGOLOGY

## 2022-11-22 PROCEDURE — 99213 OFFICE O/P EST LOW 20 MIN: CPT | Mod: PBBFAC,25 | Performed by: OTOLARYNGOLOGY

## 2022-11-22 PROCEDURE — 99204 PR OFFICE/OUTPT VISIT, NEW, LEVL IV, 45-59 MIN: ICD-10-PCS | Mod: S$PBB,,, | Performed by: OTOLARYNGOLOGY

## 2022-11-22 RX ORDER — EPINEPHRINE 1 MG/ML
0.3 INJECTION, SOLUTION, CONCENTRATE INTRAVENOUS
Status: CANCELLED | OUTPATIENT
Start: 2023-01-11

## 2022-11-22 RX ORDER — CETIRIZINE HYDROCHLORIDE 10 MG/1
10 TABLET ORAL ONCE
Status: DISCONTINUED | OUTPATIENT
Start: 2022-11-22 | End: 2022-11-22 | Stop reason: HOSPADM

## 2022-11-22 RX ORDER — IPRATROPIUM BROMIDE AND ALBUTEROL SULFATE 2.5; .5 MG/3ML; MG/3ML
3 SOLUTION RESPIRATORY (INHALATION)
Status: CANCELLED | OUTPATIENT
Start: 2023-01-11

## 2022-11-22 RX ORDER — DIPHENHYDRAMINE HYDROCHLORIDE 50 MG/ML
25 INJECTION INTRAMUSCULAR; INTRAVENOUS
Status: CANCELLED | OUTPATIENT
Start: 2023-01-11

## 2022-11-22 RX ORDER — METHYLPREDNISOLONE SOD SUCC 125 MG
40 VIAL (EA) INJECTION
Status: CANCELLED | OUTPATIENT
Start: 2023-01-11

## 2022-11-22 RX ORDER — ACETAMINOPHEN 325 MG/1
650 TABLET ORAL ONCE
Status: DISCONTINUED | OUTPATIENT
Start: 2022-11-22 | End: 2022-11-22 | Stop reason: HOSPADM

## 2022-11-22 RX ORDER — CETIRIZINE HYDROCHLORIDE 10 MG/1
10 TABLET ORAL ONCE
Status: CANCELLED | OUTPATIENT
Start: 2023-01-11 | End: 2023-01-11

## 2022-11-22 RX ORDER — DIPHENHYDRAMINE HYDROCHLORIDE 50 MG/ML
25 INJECTION INTRAMUSCULAR; INTRAVENOUS ONCE
Status: DISCONTINUED | OUTPATIENT
Start: 2022-11-22 | End: 2022-12-19

## 2022-11-22 RX ORDER — CLINDAMYCIN HYDROCHLORIDE 300 MG/1
300 CAPSULE ORAL 2 TIMES DAILY
Qty: 28 CAPSULE | Refills: 0 | Status: SHIPPED | OUTPATIENT
Start: 2022-11-22 | End: 2022-11-23 | Stop reason: ALTCHOICE

## 2022-11-22 RX ORDER — ACETAMINOPHEN 325 MG/1
650 TABLET ORAL ONCE
Status: CANCELLED | OUTPATIENT
Start: 2023-01-11 | End: 2023-01-11

## 2022-11-22 RX ORDER — SODIUM CHLORIDE 0.9 % (FLUSH) 0.9 %
10 SYRINGE (ML) INJECTION
Status: DISCONTINUED | OUTPATIENT
Start: 2022-11-22 | End: 2022-11-22 | Stop reason: HOSPADM

## 2022-11-22 RX ORDER — ACETAMINOPHEN 325 MG/1
650 TABLET ORAL
Status: CANCELLED | OUTPATIENT
Start: 2023-01-11

## 2022-11-22 RX ORDER — SODIUM CHLORIDE 0.9 % (FLUSH) 0.9 %
10 SYRINGE (ML) INJECTION
Status: CANCELLED | OUTPATIENT
Start: 2023-01-11

## 2022-11-22 RX ADMIN — VEDOLIZUMAB 300 MG: 300 INJECTION, POWDER, LYOPHILIZED, FOR SOLUTION INTRAVENOUS at 08:11

## 2022-11-22 NOTE — PROGRESS NOTES
0810 pt here for infusion, resting in recliner, TP released and pharmacy notified  0824 Entyvio started at 500ml/hr  Appt made for 2 weeks  0900 Infusion complete, tolerated well  0930 pt discharged ambulatory with no adverse reactions, notified to go to ER with any problems

## 2022-11-22 NOTE — PROGRESS NOTES
Subjective:       Patient ID: Isabella Kelly is a 67 y.o. female.    Chief Complaint: Other (Patient presents for a possible nasal abscess in the right nostril. )  Tender on dorsum nose and now has tender right MELISSA nodes no better with augmentin   Has UC   HPI  Review of Systems   HENT:  Positive for facial swelling, sinus pain and sore throat.    All other systems reviewed and are negative.    Objective:      Physical Exam  General: NAD  Head: Normocephalic, atraumatic, no facial asymmetry/normal strength,  Ears: Both auricules normal in appearance, w/o deformities tympanic membranes normal external auditory canals normal  Nose: External nose swollen red normal turbinates no drainage or inflammation  Oral Cavity: Lips, gums, floor of mouth, tongue hard palate, and buccal mucosa without mass/lesion  Oropharynx: Mucosa pink and moist, soft palate, posterior pharynx and oropharyngeal wall without mass/lesion  Neck: Supple, symmetric, trachea midline, no palpable mass/lesion, + right  palpable cervical lymphadenopathy  Skin: Warm and dry, no concerning lesions  Respiratory: Respirations even, unlabored   Assessment:       1. Cellulitis of external nose    2. Lymphadenopathy        Plan:       Switch to cleocin   F/u 10 days

## 2022-11-23 RX ORDER — AMOXICILLIN AND CLAVULANATE POTASSIUM 500; 125 MG/1; MG/1
1 TABLET, FILM COATED ORAL 2 TIMES DAILY
Qty: 28 TABLET | Refills: 0 | Status: SHIPPED | OUTPATIENT
Start: 2022-11-23 | End: 2022-12-07

## 2022-11-23 RX ORDER — METHYLPREDNISOLONE 4 MG/1
TABLET ORAL
Qty: 21 TABLET | Refills: 0 | Status: SHIPPED | OUTPATIENT
Start: 2022-11-23 | End: 2022-12-14

## 2022-12-02 ENCOUNTER — OFFICE VISIT (OUTPATIENT)
Dept: OTOLARYNGOLOGY | Facility: CLINIC | Age: 67
End: 2022-12-02
Payer: MEDICARE

## 2022-12-02 VITALS — BODY MASS INDEX: 23.34 KG/M2 | WEIGHT: 154 LBS | HEIGHT: 68 IN

## 2022-12-02 DIAGNOSIS — J34.0 CELLULITIS OF EXTERNAL NOSE: Primary | ICD-10-CM

## 2022-12-02 PROCEDURE — 99213 OFFICE O/P EST LOW 20 MIN: CPT | Mod: PBBFAC | Performed by: OTOLARYNGOLOGY

## 2022-12-02 PROCEDURE — 99214 OFFICE O/P EST MOD 30 MIN: CPT | Mod: S$PBB,,, | Performed by: OTOLARYNGOLOGY

## 2022-12-02 PROCEDURE — 99214 PR OFFICE/OUTPT VISIT, EST, LEVL IV, 30-39 MIN: ICD-10-PCS | Mod: S$PBB,,, | Performed by: OTOLARYNGOLOGY

## 2022-12-02 NOTE — PROGRESS NOTES
Subjective:       Patient ID: Isabella Kelly is a 67 y.o. female.    Chief Complaint: Other (Pt states cellulitis has moved to the left side now, right side of nose swelling is gone and continues to take her abx. )    HPI  Review of Systems   Constitutional:  Negative for chills, fatigue and fever.   HENT:  Positive for nasal congestion. Negative for ear discharge, ear pain, nosebleeds, postnasal drip, rhinorrhea and sinus pressure/congestion.        Objective:      Physical Exam  Constitutional:       Appearance: Normal appearance.   HENT:      Head: Normocephalic.      Right Ear: Tympanic membrane, ear canal and external ear normal.      Left Ear: Tympanic membrane, ear canal and external ear normal.      Nose: Nose normal.      Mouth/Throat:      Lips: Pink.      Mouth: Mucous membranes are moist.      Pharynx: Oropharynx is clear.   Eyes:      General: Lids are normal.      Pupils: Pupils are equal, round, and reactive to light.   Pulmonary:      Effort: Pulmonary effort is normal.   Musculoskeletal:      Cervical back: Normal range of motion.   Skin:     General: Skin is warm and dry.   Neurological:      Mental Status: She is alert and oriented to person, place, and time.   Psychiatric:         Mood and Affect: Mood normal.         Behavior: Behavior is cooperative.       Assessment:       Problem List Items Addressed This Visit    None  Visit Diagnoses       Cellulitis of external nose    -  Primary          Better   Plan:   RTC as needed. Complete antibiotic.

## 2022-12-06 ENCOUNTER — INFUSION (OUTPATIENT)
Dept: INFUSION THERAPY | Facility: HOSPITAL | Age: 67
End: 2022-12-06
Attending: NURSE PRACTITIONER
Payer: MEDICARE

## 2022-12-06 VITALS
TEMPERATURE: 98 F | RESPIRATION RATE: 18 BRPM | SYSTOLIC BLOOD PRESSURE: 148 MMHG | OXYGEN SATURATION: 100 % | DIASTOLIC BLOOD PRESSURE: 86 MMHG | HEART RATE: 80 BPM

## 2022-12-06 DIAGNOSIS — Z96.652 H/O TOTAL KNEE REPLACEMENT, LEFT: Primary | ICD-10-CM

## 2022-12-06 PROCEDURE — 96365 THER/PROPH/DIAG IV INF INIT: CPT

## 2022-12-06 PROCEDURE — 63600175 PHARM REV CODE 636 W HCPCS: Mod: JG | Performed by: NURSE PRACTITIONER

## 2022-12-06 PROCEDURE — 25000003 PHARM REV CODE 250: Performed by: NURSE PRACTITIONER

## 2022-12-06 RX ORDER — ACETAMINOPHEN 325 MG/1
650 TABLET ORAL
Status: CANCELLED | OUTPATIENT
Start: 2023-01-11

## 2022-12-06 RX ORDER — ACETAMINOPHEN 325 MG/1
650 TABLET ORAL ONCE
Status: DISCONTINUED | OUTPATIENT
Start: 2022-12-06 | End: 2022-12-06 | Stop reason: HOSPADM

## 2022-12-06 RX ORDER — CETIRIZINE HYDROCHLORIDE 10 MG/1
10 TABLET ORAL ONCE
Status: DISCONTINUED | OUTPATIENT
Start: 2022-12-06 | End: 2022-12-06 | Stop reason: HOSPADM

## 2022-12-06 RX ORDER — SODIUM CHLORIDE 0.9 % (FLUSH) 0.9 %
10 SYRINGE (ML) INJECTION
Status: DISCONTINUED | OUTPATIENT
Start: 2022-12-06 | End: 2022-12-06 | Stop reason: HOSPADM

## 2022-12-06 RX ORDER — DIPHENHYDRAMINE HYDROCHLORIDE 50 MG/ML
25 INJECTION INTRAMUSCULAR; INTRAVENOUS
Status: CANCELLED | OUTPATIENT
Start: 2023-01-11

## 2022-12-06 RX ORDER — IPRATROPIUM BROMIDE AND ALBUTEROL SULFATE 2.5; .5 MG/3ML; MG/3ML
3 SOLUTION RESPIRATORY (INHALATION)
Status: CANCELLED | OUTPATIENT
Start: 2023-01-11

## 2022-12-06 RX ORDER — CETIRIZINE HYDROCHLORIDE 10 MG/1
10 TABLET ORAL ONCE
Status: CANCELLED | OUTPATIENT
Start: 2023-01-11 | End: 2023-01-11

## 2022-12-06 RX ORDER — ACETAMINOPHEN 325 MG/1
650 TABLET ORAL ONCE
Status: CANCELLED | OUTPATIENT
Start: 2023-01-11 | End: 2023-01-11

## 2022-12-06 RX ORDER — EPINEPHRINE 1 MG/ML
0.3 INJECTION, SOLUTION, CONCENTRATE INTRAVENOUS
Status: CANCELLED | OUTPATIENT
Start: 2023-01-11

## 2022-12-06 RX ORDER — SODIUM CHLORIDE 0.9 % (FLUSH) 0.9 %
10 SYRINGE (ML) INJECTION
Status: CANCELLED | OUTPATIENT
Start: 2023-01-11

## 2022-12-06 RX ORDER — DIPHENHYDRAMINE HYDROCHLORIDE 50 MG/ML
25 INJECTION INTRAMUSCULAR; INTRAVENOUS ONCE
Status: DISCONTINUED | OUTPATIENT
Start: 2022-12-06 | End: 2022-12-19

## 2022-12-06 RX ORDER — METHYLPREDNISOLONE SOD SUCC 125 MG
40 VIAL (EA) INJECTION
Status: CANCELLED | OUTPATIENT
Start: 2023-01-11

## 2022-12-06 RX ADMIN — VEDOLIZUMAB 300 MG: 300 INJECTION, POWDER, LYOPHILIZED, FOR SOLUTION INTRAVENOUS at 09:12

## 2022-12-06 NOTE — PROGRESS NOTES
0838: Pt here for infusion and TP released and Pharm notified.  0903: Entyvio infusion started. Will cont to monitor. VSS. Pt states her back is hurting and that she moved furniture yesterday.   0938: Infusion complete. VSS no adverse reactions noted at present. Next appt given.  1003: Pt dc'd 22 g Iv cath dc'd and pt amb home.

## 2022-12-12 ENCOUNTER — OFFICE VISIT (OUTPATIENT)
Dept: GASTROENTEROLOGY | Facility: CLINIC | Age: 67
End: 2022-12-12
Payer: MEDICARE

## 2022-12-12 VITALS
WEIGHT: 162.19 LBS | HEART RATE: 93 BPM | DIASTOLIC BLOOD PRESSURE: 81 MMHG | SYSTOLIC BLOOD PRESSURE: 147 MMHG | HEIGHT: 68 IN | OXYGEN SATURATION: 97 % | BODY MASS INDEX: 24.58 KG/M2

## 2022-12-12 DIAGNOSIS — K51.919 ULCERATIVE COLITIS WITH COMPLICATION, UNSPECIFIED LOCATION: Primary | ICD-10-CM

## 2022-12-12 DIAGNOSIS — S81.802A NON-HEALING WOUND OF LEFT LOWER EXTREMITY: ICD-10-CM

## 2022-12-12 PROCEDURE — 99214 PR OFFICE/OUTPT VISIT, EST, LEVL IV, 30-39 MIN: ICD-10-PCS | Mod: ,,, | Performed by: NURSE PRACTITIONER

## 2022-12-12 PROCEDURE — 99214 OFFICE O/P EST MOD 30 MIN: CPT | Mod: ,,, | Performed by: NURSE PRACTITIONER

## 2022-12-12 NOTE — PROGRESS NOTES
"    Isabella Kelly is a 67 y.o. female here for Follow-up and Ulcerative Colitis (Recently changed to Entyvio)        PCP: Jack Swift  Referring Provider: No referring provider defined for this encounter.     HPI:  Presents for follow up UC. Patient has had two Entyvio infusions. States that she has had difficulties with frequent infections including skin lesions, sinus infections, and cellulitis in her nose. She continues to have some blood and mucous in her stool but feels that this has improved slightly. Abdominal cramping with BM in the am. CATRACHITA. Required blood transfusion in November. States that since the blood transfusion that she is feeling much better. Appetite has improved. Failed Mesalamine's. Colonoscopy 9/19/22 reviewed, moderate chronic colitis sigmoid, minimal proctitis. She has a dime size round, draining wound on the back of the LLL. States that this has been there for about a month. Has been putting Mupirocin ointment on the area.Denies injury to the LLL. States that the "bumps" randomly appeared. She recently completed a 2 week course of Augmentin. Discussed referral to derm due to UC and immune compromised state for evaluation.    Follow-up  Pertinent negatives include no abdominal pain, change in bowel habit, chest pain, fatigue, fever, nausea or vomiting.       ROS:  Review of Systems   Constitutional:  Negative for appetite change, fatigue, fever and unexpected weight change.   HENT:  Negative for trouble swallowing.    Respiratory:  Negative for shortness of breath.    Cardiovascular:  Negative for chest pain.   Gastrointestinal:  Positive for blood in stool. Negative for abdominal pain, change in bowel habit, constipation, diarrhea (loose), nausea, vomiting, reflux and change in bowel habit.   Musculoskeletal:  Negative for gait problem.   Integumentary:  Positive for wound. Negative for pallor.   Psychiatric/Behavioral:  The patient is not nervous/anxious.         PMHX:  has a past " medical history of Bacterial vaginosis, Cystocele with rectocele (11/28/2007), Cystocele, midline (12/12/2007), Depressive disorder (09/2004), Dysmenorrhea (2006), Esophageal reflux, Granulation tissue (08/12/2008), Hypercholesterolemia (09/20/2004), Hyperlipidemia (10/26/2011), Hypertension, Hypertriglyceridemia (10/04/2010), Hypertriglyceridemia (10/04/2010), IBD (inflammatory bowel disease), Large uterus, Menorrhagia, Non-healing wound of left lower extremity (12/12/2022), Polymenorrhea, PONV (postoperative nausea and vomiting), Post-hysterectomy menopause, Rectocele, Stress incontinence, Ulcerative colitis (08/2020), Uterine prolapse, and Vaginal discharge.    PSHX:  has a past surgical history that includes anterior/posterior colporrhaphy with vaginal enterocele repair (06/18/2008); Appendectomy; Colonoscopy; Dilation and curettage of uterus; Hysteroscopy (11/28/2007); Endometrial biopsy (02/16/2006); ENDOMETRIAL BIOPSY REPEAT  (11/06/2007); SILVER NITRATE CAUTERY OF VAGINAL WALL (08/12/2008); SIS (11/15/2007); Total abdominal hysterectomy; and TOT SLING (06/18/2008).    PFHX: Family history is unknown by patient.    PSlHX:  reports that she has never smoked. She has never used smokeless tobacco. She reports that she does not drink alcohol and does not use drugs.        Review of patient's allergies indicates:  No Known Allergies    Medication List with Changes/Refills   Current Medications    ESTRADIOL (ESTRACE) 1 MG TABLET    Take 1 tablet (1 mg total) by mouth once daily.    FERROUS SULFATE, DRIED (SLOW FE) 160 MG (50 MG IRON) TBSR    Take 160 mg by mouth 2 (two) times a day.    HYDROCODONE-ACETAMINOPHEN (NORCO) 7.5-325 MG PER TABLET    1/2-1 p.o. Q 6 hours p.r.n. ulcer pain    LEVOTHYROXINE (SYNTHROID) 100 MCG TABLET    Take 100 mcg by mouth before breakfast.    LIDOCAINE HCL 2% (XYLOCAINE) 2 % SOLN    by Mucous Membrane route every 6 (six) hours as needed (Mouth ulcers).    LISINOPRIL (PRINIVIL,ZESTRIL) 5  "MG TABLET    Take 5 mg by mouth once daily.    METHYLPREDNISOLONE (MEDROL DOSEPACK) 4 MG TABLET    use as directed    OMEPRAZOLE (PRILOSEC) 40 MG CAPSULE    Take 40 mg by mouth 2 (two) times daily.    ONDANSETRON (ZOFRAN) 4 MG TABLET    Take 1 tablet (4 mg total) by mouth every 6 (six) hours.    PROMETHAZINE (PHENERGAN) 25 MG TABLET    Take 1 tablet (25 mg total) by mouth every 6 (six) hours as needed for Nausea.    ROSUVASTATIN (CRESTOR) 10 MG TABLET    TAKE 1 TABLET BY MOUTH EVERY DAY WITH SELENIUM 200 MCG        Objective Findings:  Vital Signs:  BP (!) 147/81   Pulse 93   Ht 5' 8" (1.727 m)   Wt 73.6 kg (162 lb 3.2 oz)   SpO2 97%   BMI 24.66 kg/m²  Body mass index is 24.66 kg/m².    Physical Exam:  Physical Exam  Vitals and nursing note reviewed.   Constitutional:       General: She is not in acute distress.     Appearance: Normal appearance. She is not ill-appearing.   HENT:      Mouth/Throat:      Mouth: Mucous membranes are moist.   Cardiovascular:      Rate and Rhythm: Normal rate.   Pulmonary:      Effort: Pulmonary effort is normal.      Breath sounds: No wheezing, rhonchi or rales.   Abdominal:      General: Bowel sounds are normal. There is no distension.      Palpations: Abdomen is soft. There is no mass.      Tenderness: There is no abdominal tenderness. There is no guarding.      Hernia: No hernia is present.   Skin:     General: Skin is warm and dry.      Coloration: Skin is not jaundiced or pale.      Findings: Lesion present. No bruising.   Neurological:      Mental Status: She is alert and oriented to person, place, and time.   Psychiatric:         Mood and Affect: Mood normal.        Labs:  Lab Results   Component Value Date    WBC 12.28 (H) 11/17/2022    HGB 7.3 (L) 11/17/2022    HCT 24.8 (L) 11/17/2022    MCV 88.6 11/17/2022    RDW 15.2 (H) 11/17/2022     (H) 11/17/2022    LYMPH 6.8 (L) 11/17/2022    LYMPH 0.84 (L) 11/17/2022    MONO 4.2 11/17/2022    EOS 0.06 11/17/2022    BASO " 0.04 11/17/2022     Lab Results   Component Value Date     10/30/2022    K 4.9 10/30/2022    CL 98 10/30/2022    CO2 24 10/30/2022    GLU 96 10/30/2022    BUN 12 10/30/2022    CREATININE 0.60 10/30/2022    CALCIUM 8.6 10/30/2022    PROT 6.6 10/30/2022    ALBUMIN 2.6 (L) 10/30/2022    BILITOT 0.4 10/30/2022    ALKPHOS 108 10/30/2022    AST 45 (H) 10/30/2022    ALT 39 10/30/2022         Imaging: No results found.      Assessment:  Isabella Kelly is a 67 y.o. female here with:  1. Ulcerative colitis with complication, unspecified location    2. Non-healing wound of left lower extremity          Recommendations:  1. Continue Entyvio   2. Avoid NSAID's  3. Refer to Derm     Follow up in about 2 months (around 2/12/2023).      Order summary:  Orders Placed This Encounter    CBC Auto Differential    Comprehensive Metabolic Panel    Iron and TIBC    Ferritin    C-Reactive Protein    Ambulatory referral/consult to Dermatology       Thank you for allowing me to participate in the care of Isabella Kelly.      LORA NevilleC

## 2022-12-19 ENCOUNTER — HOSPITAL ENCOUNTER (OUTPATIENT)
Dept: GASTROENTEROLOGY | Facility: HOSPITAL | Age: 67
Discharge: HOME | End: 2022-12-19
Attending: NURSE PRACTITIONER
Payer: MEDICARE

## 2022-12-19 VITALS
SYSTOLIC BLOOD PRESSURE: 148 MMHG | RESPIRATION RATE: 17 BRPM | OXYGEN SATURATION: 97 % | DIASTOLIC BLOOD PRESSURE: 89 MMHG | HEART RATE: 87 BPM

## 2022-12-19 DIAGNOSIS — D50.0 IRON DEFICIENCY ANEMIA DUE TO CHRONIC BLOOD LOSS: ICD-10-CM

## 2022-12-19 DIAGNOSIS — K51.011 ULCERATIVE PANCOLITIS WITH RECTAL BLEEDING: Primary | ICD-10-CM

## 2022-12-19 PROCEDURE — 96365 THER/PROPH/DIAG IV INF INIT: CPT | Performed by: INTERNAL MEDICINE

## 2022-12-19 PROCEDURE — 25000003 PHARM REV CODE 250: Performed by: INTERNAL MEDICINE

## 2022-12-19 PROCEDURE — 63600175 PHARM REV CODE 636 W HCPCS: Performed by: INTERNAL MEDICINE

## 2022-12-19 RX ADMIN — SODIUM CHLORIDE 275 MG: 9 INJECTION, SOLUTION INTRAVENOUS at 01:12

## 2022-12-19 RX ADMIN — SODIUM CHLORIDE 25 MG: 9 INJECTION, SOLUTION INTRAVENOUS at 01:12

## 2022-12-19 NOTE — H&P
[facility]  Gastroenterology  H&P    Patient Name: Isabella Kelly  MRN: [id number]  Admission Date: 12/19/2022  Code Status: Prior    Attending Provider: SAUL Jett   Primary Care Physician: Jack Swift MD  Principal Problem:<principal problem not specified>    Subjective:     History of Present Illness: Pt has UC with bleeding and chronic anemia; for IV iron. Anemia has been treated with pRBC transfusion and oral iron.    Past Medical History:   Diagnosis Date    Bacterial vaginosis     Cystocele with rectocele 11/28/2007    1st degree cystocele; 2nd degree rectocele    Cystocele, midline 12/12/2007    midline    Depressive disorder 09/2004    mild    Dysmenorrhea 2006    severe first day and getting worse    Esophageal reflux     Granulation tissue 08/12/2008    5 - 8 mm long lower post repair granulation tissue - treated with sliver nitrate cautery    Hypercholesterolemia 09/20/2004    cholesterol is 219 mg/dl    Hyperlipidemia 10/26/2011    mile;  TG  71 mg/dl;  HDL  67 mg/dl;  LDL  136 mg/dl;  total cholesterol  217 mg/dl.  10/13/2017:  LDL  163 mg/dl;  total cholesterol  260 mg/dl; triglycerides  186 mg/dl;  HDL  66 mg/dl    Hypertension     Hypertriglyceridemia 10/04/2010    216 mg/dl;  [id number]    Hypertriglyceridemia 10/04/2010    216  mg/dll  02/01/2013 - recent kevek us 238 mg/dl    IBD (inflammatory bowel disease)     Large uterus     Menorrhagia     Non-healing wound of left lower extremity 12/12/2022    Polymenorrhea     PONV (postoperative nausea and vomiting)     Post-hysterectomy menopause     Rectocele     Stress incontinence     Ulcerative colitis 08/2020    Ulcerative colitis confirmed by colonoscopy by Dr. Ho Capellan    Uterine prolapse     Vaginal discharge        Past Surgical History:   Procedure Laterality Date    anterior/posterior colporrhaphy with vaginal enterocele repair  06/18/2008    APPENDECTOMY      COLONOSCOPY      DILATION AND CURETTAGE OF UTERUS       [id number]    ENDOMETRIAL BIOPSY  02/16/2006    ENDOMETRIAL BIOPSY REPEAT   11/06/2007    HYSTEROSCOPY  11/28/2007    SILVER NITRATE CAUTERY OF VAGINAL WALL  08/12/2008    SIS  11/15/2007    TOT SLING  06/18/2008    TOTAL ABDOMINAL HYSTERECTOMY         Review of patient's allergies indicates:  No Known Allergies  Family History       Family history is unknown by patient.          Tobacco Use    Smoking status: Never    Smokeless tobacco: Never   Substance and Sexual Activity    Alcohol use: Never    Drug use: Never    Sexual activity: Yes     Partners: Male     Birth control/protection: See Surgical Hx     Review of Systems   Respiratory: Negative.     Cardiovascular: Negative.    Gastrointestinal:  Positive for blood in stool.   Objective:     Vital Signs (Most Recent):  Pulse: 89 (12/19/22 1317)  Resp: 12 (12/19/22 1317)  BP: (!) 142/83 (12/19/22 1317)   Vital Signs (24h Range):  Pulse:  [89] 89  Resp:  [12] 12  BP: (142)/(83) 142/83        There is no height or weight on file to calculate BMI.    No intake or output data in the 24 hours ending 12/19/22 1337    Lines/Drains/Airways       Peripheral Intravenous Line  Duration                  Peripheral IV - Single Lumen 12/19/22 1316 22 G Anterior;Right Wrist <1 day                    Physical Exam  Vitals reviewed.   Constitutional:       General: She is not in acute distress.     Appearance: Normal appearance. She is well-developed. She is not ill-appearing.   HENT:      Head: Normocephalic and atraumatic.      Nose: Nose normal.   Eyes:      Pupils: Pupils are equal, round, and reactive to light.   Cardiovascular:      Rate and Rhythm: Normal rate and regular rhythm.   Pulmonary:      Effort: Pulmonary effort is normal.      Breath sounds: Normal breath sounds. No wheezing.   Abdominal:      General: Abdomen is flat. Bowel sounds are normal. There is no distension.      Palpations: Abdomen is soft.      Tenderness: There is no abdominal tenderness. There is  no guarding.   Skin:     General: Skin is warm and dry.      Coloration: Skin is not jaundiced.   Neurological:      Mental Status: She is alert.   Psychiatric:         Attention and Perception: Attention normal.         Mood and Affect: Affect normal.         Speech: Speech normal.         Behavior: Behavior is cooperative.      Comments: Pt was calm while speaking.       Significant Labs:  CBC: No results for input(s): WBC, HGB, HCT, PLT in the last 48 hours.  CMP: No results for input(s): GLU, CALCIUM, ALBUMIN, PROT, NA, K, CO2, CL, BUN, CREATININE, ALKPHOS, ALT, AST, BILITOT in the last 48 hours.    Significant Imaging:  Imaging results within the past 24 hours have been reviewed.    Assessment/Plan:     There are no hospital problems to display for this patient.        Imp: UC with bleeding and iron def anemia, now on Entyvio\  Plan: 300mg IV iron dextran today.    Chris Estevez MD  Gastroenterology  [facility]

## 2023-01-02 ENCOUNTER — PATIENT MESSAGE (OUTPATIENT)
Dept: GASTROENTEROLOGY | Facility: CLINIC | Age: 68
End: 2023-01-02
Payer: MEDICARE

## 2023-01-03 ENCOUNTER — TELEPHONE (OUTPATIENT)
Dept: GASTROENTEROLOGY | Facility: CLINIC | Age: 68
End: 2023-01-03
Payer: MEDICARE

## 2023-01-03 DIAGNOSIS — K51.011 ULCERATIVE PANCOLITIS WITH RECTAL BLEEDING: Primary | ICD-10-CM

## 2023-01-03 RX ORDER — BUDESONIDE 3 MG/1
9 CAPSULE, COATED PELLETS ORAL DAILY
Qty: 42 CAPSULE | Refills: 1 | Status: SHIPPED | OUTPATIENT
Start: 2023-01-03 | End: 2023-01-17

## 2023-01-04 ENCOUNTER — INFUSION (OUTPATIENT)
Dept: INFUSION THERAPY | Facility: HOSPITAL | Age: 68
End: 2023-01-04
Attending: NURSE PRACTITIONER
Payer: MEDICARE

## 2023-01-04 VITALS
RESPIRATION RATE: 17 BRPM | OXYGEN SATURATION: 100 % | HEART RATE: 89 BPM | SYSTOLIC BLOOD PRESSURE: 142 MMHG | DIASTOLIC BLOOD PRESSURE: 75 MMHG | TEMPERATURE: 98 F

## 2023-01-04 DIAGNOSIS — Z96.652 H/O TOTAL KNEE REPLACEMENT, LEFT: Primary | ICD-10-CM

## 2023-01-04 PROCEDURE — 25000003 PHARM REV CODE 250: Performed by: NURSE PRACTITIONER

## 2023-01-04 PROCEDURE — 96365 THER/PROPH/DIAG IV INF INIT: CPT

## 2023-01-04 PROCEDURE — 63600175 PHARM REV CODE 636 W HCPCS: Mod: JG | Performed by: NURSE PRACTITIONER

## 2023-01-04 RX ORDER — ACETAMINOPHEN 325 MG/1
650 TABLET ORAL ONCE
Status: CANCELLED | OUTPATIENT
Start: 2023-01-11 | End: 2023-01-11

## 2023-01-04 RX ORDER — SODIUM CHLORIDE 0.9 % (FLUSH) 0.9 %
10 SYRINGE (ML) INJECTION
Status: DISCONTINUED | OUTPATIENT
Start: 2023-01-04 | End: 2023-01-04 | Stop reason: HOSPADM

## 2023-01-04 RX ORDER — IPRATROPIUM BROMIDE AND ALBUTEROL SULFATE 2.5; .5 MG/3ML; MG/3ML
3 SOLUTION RESPIRATORY (INHALATION)
Status: CANCELLED | OUTPATIENT
Start: 2023-01-11

## 2023-01-04 RX ORDER — CETIRIZINE HYDROCHLORIDE 10 MG/1
10 TABLET ORAL ONCE
Status: DISCONTINUED | OUTPATIENT
Start: 2023-01-04 | End: 2023-01-04 | Stop reason: HOSPADM

## 2023-01-04 RX ORDER — DIPHENHYDRAMINE HYDROCHLORIDE 50 MG/ML
25 INJECTION INTRAMUSCULAR; INTRAVENOUS ONCE
Status: ACTIVE | OUTPATIENT
Start: 2023-01-04

## 2023-01-04 RX ORDER — EPINEPHRINE 1 MG/ML
0.3 INJECTION, SOLUTION, CONCENTRATE INTRAVENOUS
Status: CANCELLED | OUTPATIENT
Start: 2023-01-11

## 2023-01-04 RX ORDER — ACETAMINOPHEN 325 MG/1
650 TABLET ORAL ONCE
Status: DISCONTINUED | OUTPATIENT
Start: 2023-01-04 | End: 2023-01-04 | Stop reason: HOSPADM

## 2023-01-04 RX ORDER — DIPHENHYDRAMINE HYDROCHLORIDE 50 MG/ML
25 INJECTION INTRAMUSCULAR; INTRAVENOUS
Status: CANCELLED | OUTPATIENT
Start: 2023-01-11

## 2023-01-04 RX ORDER — SODIUM CHLORIDE 0.9 % (FLUSH) 0.9 %
10 SYRINGE (ML) INJECTION
Status: CANCELLED | OUTPATIENT
Start: 2023-01-11

## 2023-01-04 RX ORDER — METHYLPREDNISOLONE SOD SUCC 125 MG
40 VIAL (EA) INJECTION
Status: CANCELLED | OUTPATIENT
Start: 2023-01-11

## 2023-01-04 RX ORDER — CETIRIZINE HYDROCHLORIDE 10 MG/1
10 TABLET ORAL ONCE
Status: CANCELLED | OUTPATIENT
Start: 2023-01-11 | End: 2023-01-11

## 2023-01-04 RX ORDER — ACETAMINOPHEN 325 MG/1
650 TABLET ORAL
Status: CANCELLED | OUTPATIENT
Start: 2023-01-11

## 2023-01-04 RX ADMIN — VEDOLIZUMAB 300 MG: 300 INJECTION, POWDER, LYOPHILIZED, FOR SOLUTION INTRAVENOUS at 09:01

## 2023-01-04 NOTE — PROGRESS NOTES
0840 Pt here for Entyvio infusion, resting in recliner, TP released and pharmacy notified   0907  Entyvio infusion started  0938 Entyvio infusion complete, tolerated well, will continue to monitor  Follow up appt made for 8 weeks  1000 pt discharged ambulatory with no adverse reaction noted, pt notified to go to ER with any adverse reactions, AVS given

## 2023-01-13 ENCOUNTER — OFFICE VISIT (OUTPATIENT)
Dept: FAMILY MEDICINE | Facility: CLINIC | Age: 68
End: 2023-01-13
Payer: MEDICARE

## 2023-01-13 VITALS
BODY MASS INDEX: 24.94 KG/M2 | SYSTOLIC BLOOD PRESSURE: 131 MMHG | WEIGHT: 164 LBS | TEMPERATURE: 98 F | OXYGEN SATURATION: 97 % | HEART RATE: 102 BPM | DIASTOLIC BLOOD PRESSURE: 93 MMHG

## 2023-01-13 DIAGNOSIS — R09.81 SINUS CONGESTION: ICD-10-CM

## 2023-01-13 DIAGNOSIS — R05.9 COUGH, UNSPECIFIED TYPE: ICD-10-CM

## 2023-01-13 DIAGNOSIS — J11.1 INFLUENZA: Primary | ICD-10-CM

## 2023-01-13 PROBLEM — E78.5 HYPERLIPIDEMIA: Status: ACTIVE | Noted: 2023-01-13

## 2023-01-13 PROBLEM — E03.9 ACQUIRED HYPOTHYROIDISM: Status: ACTIVE | Noted: 2023-01-13

## 2023-01-13 PROBLEM — Z00.00 ENCOUNTER FOR GENERAL ADULT MEDICAL EXAMINATION WITHOUT ABNORMAL FINDINGS: Status: ACTIVE | Noted: 2023-01-13

## 2023-01-13 PROBLEM — G89.29 CHRONIC PAIN: Status: ACTIVE | Noted: 2023-01-13

## 2023-01-13 PROBLEM — F41.9 ANXIETY: Status: ACTIVE | Noted: 2023-01-13

## 2023-01-13 PROBLEM — I10 PRIMARY HYPERTENSION: Status: ACTIVE | Noted: 2023-01-13

## 2023-01-13 PROBLEM — M19.90 ARTHRITIS: Status: ACTIVE | Noted: 2023-01-13

## 2023-01-13 PROBLEM — S13.9XXA NECK SPRAIN: Status: ACTIVE | Noted: 2023-01-13

## 2023-01-13 PROBLEM — K51.30 CHRONIC ULCERATIVE RECTOSIGMOIDITIS: Status: ACTIVE | Noted: 2023-01-13

## 2023-01-13 PROBLEM — K57.30 DIVERTICULOSIS OF COLON: Status: ACTIVE | Noted: 2023-01-13

## 2023-01-13 PROBLEM — M17.9 OSTEOARTHRITIS OF KNEE: Status: ACTIVE | Noted: 2023-01-13

## 2023-01-13 PROBLEM — Z79.890 HORMONE REPLACEMENT THERAPY: Status: ACTIVE | Noted: 2023-01-13

## 2023-01-13 LAB
CTP QC/QA: YES
CTP QC/QA: YES
FLUAV AG NPH QL: POSITIVE
FLUBV AG NPH QL: NEGATIVE
SARS-COV-2 AG RESP QL IA.RAPID: NEGATIVE

## 2023-01-13 PROCEDURE — 87804 INFLUENZA ASSAY W/OPTIC: CPT | Mod: 59,RHCUB | Performed by: NURSE PRACTITIONER

## 2023-01-13 PROCEDURE — 87426 SARSCOV CORONAVIRUS AG IA: CPT | Mod: RHCUB | Performed by: NURSE PRACTITIONER

## 2023-01-13 PROCEDURE — 99203 PR OFFICE/OUTPT VISIT, NEW, LEVL III, 30-44 MIN: ICD-10-PCS | Mod: ,,, | Performed by: NURSE PRACTITIONER

## 2023-01-13 PROCEDURE — 99203 OFFICE O/P NEW LOW 30 MIN: CPT | Mod: ,,, | Performed by: NURSE PRACTITIONER

## 2023-01-13 RX ORDER — OSELTAMIVIR PHOSPHATE 75 MG/1
75 CAPSULE ORAL 2 TIMES DAILY
Qty: 10 CAPSULE | Refills: 0 | Status: SHIPPED | OUTPATIENT
Start: 2023-01-13 | End: 2023-01-18

## 2023-01-13 RX ORDER — PROMETHAZINE HYDROCHLORIDE AND DEXTROMETHORPHAN HYDROBROMIDE 6.25; 15 MG/5ML; MG/5ML
5 SYRUP ORAL EVERY 6 HOURS PRN
Qty: 150 ML | Refills: 0 | Status: SHIPPED | OUTPATIENT
Start: 2023-01-13 | End: 2023-01-23

## 2023-01-13 NOTE — PROGRESS NOTES
Subjective:       Patient ID: Isabella Kelly is a 67 y.o. female.    Chief Complaint: Cough    Fever, cough, and congestion for 2 days.  has flu.     Review of Systems   Constitutional:  Positive for chills, fever and malaise/fatigue.   HENT:  Positive for congestion. Negative for ear pain.    Respiratory:  Positive for cough. Negative for hemoptysis, sputum production, shortness of breath and wheezing.    Cardiovascular: Negative.    Musculoskeletal:  Positive for myalgias.   Neurological:  Positive for headaches.        Reviewed family, medical, surgical, and social history.    Objective:      BP (!) 131/93   Pulse 102   Temp 98.3 °F (36.8 °C)   Wt 74.4 kg (164 lb)   SpO2 97%   BMI 24.94 kg/m²   Physical Exam  Vitals and nursing note reviewed.   Constitutional:       General: She is not in acute distress.     Appearance: Normal appearance. She is not ill-appearing, toxic-appearing or diaphoretic.   HENT:      Head: Normocephalic.      Right Ear: Hearing, tympanic membrane, ear canal and external ear normal.      Left Ear: Hearing, tympanic membrane, ear canal and external ear normal.      Nose: Mucosal edema, congestion and rhinorrhea present. Rhinorrhea is clear.      Right Turbinates: Enlarged and swollen.      Left Turbinates: Enlarged and swollen.      Right Sinus: No maxillary sinus tenderness or frontal sinus tenderness.      Left Sinus: No maxillary sinus tenderness or frontal sinus tenderness.      Mouth/Throat:      Lips: Pink.      Mouth: Mucous membranes are moist.      Pharynx: Uvula midline. Posterior oropharyngeal erythema present. No pharyngeal swelling, oropharyngeal exudate or uvula swelling.      Tonsils: No tonsillar exudate or tonsillar abscesses.   Cardiovascular:      Rate and Rhythm: Normal rate and regular rhythm.      Heart sounds: Normal heart sounds.   Pulmonary:      Effort: Pulmonary effort is normal.      Breath sounds: Normal breath sounds.   Skin:     General: Skin  is warm and dry.   Neurological:      Mental Status: She is alert.   Psychiatric:         Mood and Affect: Mood normal.         Behavior: Behavior normal.         Thought Content: Thought content normal.         Judgment: Judgment normal.          Office Visit on 01/13/2023   Component Date Value Ref Range Status    SARS Coronavirus 2 Antigen 01/13/2023 Negative  Negative Final     Acceptable 01/13/2023 Yes   Final    Rapid Influenza A Ag 01/13/2023 Positive (A)  Negative Final    Rapid Influenza B Ag 01/13/2023 Negative  Negative Final     Acceptable 01/13/2023 Yes   Final      Assessment:       1. Influenza    2. Cough, unspecified type    3. Sinus congestion          Plan:       Influenza  -     oseltamivir (TAMIFLU) 75 MG capsule; Take 1 capsule (75 mg total) by mouth 2 (two) times daily. for 5 days  Dispense: 10 capsule; Refill: 0  -     promethazine-dextromethorphan (PROMETHAZINE-DM) 6.25-15 mg/5 mL Syrp; Take 5 mLs by mouth every 6 (six) hours as needed (cough).  Dispense: 150 mL; Refill: 0    Cough, unspecified type  -     SARS Coronavirus 2 Antigen, POCT  -     POCT Influenza A/B    Sinus congestion  -     SARS Coronavirus 2 Antigen, POCT  -     POCT Influenza A/B    Drink plenty of fluids  OTC meds for fever  RTC PRN          Risks, benefits, and side effects were discussed with the patient. All questions were answered to the fullest satisfaction of the patient, and pt verbalized understanding and agreement to treatment plan. Pt was to call with any new or worsening symptoms, or present to the ER.

## 2023-01-17 ENCOUNTER — PATIENT MESSAGE (OUTPATIENT)
Dept: GASTROENTEROLOGY | Facility: CLINIC | Age: 68
End: 2023-01-17
Payer: MEDICARE

## 2023-02-13 ENCOUNTER — PATIENT MESSAGE (OUTPATIENT)
Dept: GASTROENTEROLOGY | Facility: CLINIC | Age: 68
End: 2023-02-13
Payer: MEDICARE

## 2023-02-13 ENCOUNTER — OFFICE VISIT (OUTPATIENT)
Dept: GASTROENTEROLOGY | Facility: CLINIC | Age: 68
End: 2023-02-13
Payer: MEDICARE

## 2023-02-13 VITALS
WEIGHT: 163.63 LBS | OXYGEN SATURATION: 97 % | BODY MASS INDEX: 24.8 KG/M2 | DIASTOLIC BLOOD PRESSURE: 84 MMHG | HEIGHT: 68 IN | HEART RATE: 98 BPM | SYSTOLIC BLOOD PRESSURE: 131 MMHG

## 2023-02-13 DIAGNOSIS — K51.011 ULCERATIVE PANCOLITIS WITH RECTAL BLEEDING: Primary | ICD-10-CM

## 2023-02-13 DIAGNOSIS — D50.9 IRON DEFICIENCY ANEMIA, UNSPECIFIED IRON DEFICIENCY ANEMIA TYPE: ICD-10-CM

## 2023-02-13 PROCEDURE — 99214 PR OFFICE/OUTPT VISIT, EST, LEVL IV, 30-39 MIN: ICD-10-PCS | Mod: S$PBB,,, | Performed by: NURSE PRACTITIONER

## 2023-02-13 PROCEDURE — 99213 OFFICE O/P EST LOW 20 MIN: CPT | Mod: PBBFAC | Performed by: NURSE PRACTITIONER

## 2023-02-13 PROCEDURE — 99214 OFFICE O/P EST MOD 30 MIN: CPT | Mod: S$PBB,,, | Performed by: NURSE PRACTITIONER

## 2023-02-13 NOTE — PROGRESS NOTES
"Isabella Kelly is a 67 y.o. female here for Follow-up        PCP: Jack Swift  Referring Provider: No referring provider defined for this encounter.     HPI:  Presents for follow up for ulcerative colitis. Patient states that she is doing much better. Reports that she feels like a "normal person". No blood in the stool. Energy is increased. Patient did require 4 weeks of budesonide for breakthrough bleeding and diarrhea. She has had three Entyvio infusions. She is due for the next infusion on March 1. She does have joint pain. Appetite is good. No weight loss. Reports that she has a bowel movement every morning without diarrhea. Takes Metamucil daily. Failed Mesalamine's. Colonoscopy 9/19/22 reviewed, moderate chronic colitis sigmoid, minimal proctitis. Iron deficiency anemia. She was referred to dermatology for a non-healing wound on her leg. Reports that this has now improved and that she did not keep the appointment.    Follow-up  Associated symptoms include arthralgias. Pertinent negatives include no abdominal pain, change in bowel habit, chest pain, fatigue, fever, nausea or vomiting.       ROS:  Review of Systems   Constitutional:  Negative for appetite change, fatigue, fever and unexpected weight change.   HENT:  Negative for trouble swallowing.    Respiratory:  Negative for shortness of breath.    Cardiovascular:  Negative for chest pain.   Gastrointestinal:  Negative for abdominal pain, blood in stool (improved), change in bowel habit, constipation, diarrhea, nausea, vomiting, reflux and change in bowel habit.   Musculoskeletal:  Positive for arthralgias. Negative for gait problem.   Integumentary:  Negative for pallor.   Allergic/Immunologic: Immunocompromised state: on Entyvio.   Neurological:  Negative for dizziness and light-headedness.   Hematological:  Does not bruise/bleed easily.   Psychiatric/Behavioral:  The patient is not nervous/anxious.         PMHX:  has a past medical history of " Acquired hypothyroidism (1/13/2023), Bacterial vaginosis, Cystocele with rectocele (11/28/2007), Cystocele, midline (12/12/2007), Depressive disorder (09/2004), Dysmenorrhea (2006), Esophageal reflux, Granulation tissue (08/12/2008), Hypercholesterolemia (09/20/2004), Hyperlipidemia (10/26/2011), Hypertension, Hypertriglyceridemia (10/04/2010), Hypertriglyceridemia (10/04/2010), IBD (inflammatory bowel disease), Iron deficiency anemia due to chronic blood loss (12/19/2022), Large uterus, Menorrhagia, Non-healing wound of left lower extremity (12/12/2022), Polymenorrhea, PONV (postoperative nausea and vomiting), Post-hysterectomy menopause, Rectocele, Stress incontinence, Ulcerative colitis (08/2020), Uterine prolapse, and Vaginal discharge.    PSHX:  has a past surgical history that includes anterior/posterior colporrhaphy with vaginal enterocele repair (06/18/2008); Appendectomy; Colonoscopy; Dilation and curettage of uterus; Hysteroscopy (11/28/2007); Endometrial biopsy (02/16/2006); ENDOMETRIAL BIOPSY REPEAT  (11/06/2007); SILVER NITRATE CAUTERY OF VAGINAL WALL (08/12/2008); SIS (11/15/2007); Total abdominal hysterectomy; and TOT SLING (06/18/2008).    PFHX: Family history is unknown by patient.    PSlHX:  reports that she has never smoked. She has never used smokeless tobacco. She reports that she does not drink alcohol and does not use drugs.        Review of patient's allergies indicates:  No Known Allergies    Medication List with Changes/Refills   Current Medications    ESTRADIOL (ESTRACE) 1 MG TABLET    Take 1 tablet (1 mg total) by mouth once daily.    LEVOTHYROXINE (SYNTHROID) 100 MCG TABLET    Take 100 mcg by mouth before breakfast.    LISINOPRIL (PRINIVIL,ZESTRIL) 5 MG TABLET    Take 5 mg by mouth once daily.    OMEPRAZOLE (PRILOSEC) 40 MG CAPSULE    Take 40 mg by mouth 2 (two) times daily.    ROSUVASTATIN (CRESTOR) 10 MG TABLET    TAKE 1 TABLET BY MOUTH EVERY DAY WITH SELENIUM 200 MCG        Objective  "Findings:  Vital Signs:  /84   Pulse 98   Ht 5' 8" (1.727 m)   Wt 74.2 kg (163 lb 9.6 oz)   SpO2 97%   BMI 24.88 kg/m²  Body mass index is 24.88 kg/m².    Physical Exam:  Physical Exam  Vitals and nursing note reviewed.   Constitutional:       General: She is not in acute distress.     Appearance: Normal appearance.   HENT:      Mouth/Throat:      Mouth: Mucous membranes are moist.   Cardiovascular:      Rate and Rhythm: Normal rate.   Pulmonary:      Effort: Pulmonary effort is normal.      Breath sounds: No wheezing, rhonchi or rales.   Abdominal:      General: Bowel sounds are normal. There is no distension.      Palpations: Abdomen is soft. There is no mass.      Tenderness: There is no abdominal tenderness.   Skin:     General: Skin is warm and dry.      Coloration: Skin is not jaundiced or pale.   Neurological:      Mental Status: She is alert and oriented to person, place, and time.   Psychiatric:         Mood and Affect: Mood normal.        Labs:  Lab Results   Component Value Date    WBC 10.26 12/12/2022    HGB 8.7 (L) 02/13/2023    HCT 29.8 (L) 02/13/2023    MCV 86.0 12/12/2022    RDW 15.2 (H) 12/12/2022     (H) 12/12/2022    LYMPH 20.4 (L) 12/12/2022    LYMPH 2.09 12/12/2022    MONO 7.4 (H) 12/12/2022    EOS 0.23 12/12/2022    BASO 0.03 12/12/2022     Lab Results   Component Value Date     12/12/2022    K 3.8 12/12/2022     12/12/2022    CO2 28 12/12/2022     12/12/2022    BUN 7 12/12/2022    CREATININE 0.55 12/12/2022    CALCIUM 9.1 12/12/2022    PROT 7.8 12/12/2022    ALBUMIN 3.0 (L) 12/12/2022    BILITOT 0.2 12/12/2022    ALKPHOS 107 12/12/2022    AST 7 (L) 12/12/2022    ALT 13 12/12/2022         Imaging: No results found.      Assessment:  Isabellachito Kelly is a 67 y.o. female here with:  1. Ulcerative pancolitis with rectal bleeding    2. Iron deficiency anemia, unspecified iron deficiency anemia type          Recommendations:  1. Iron studies today, Hgb and " Hct, CRP  2. Avoid NSAID's  3. Continue Entyvio infusions  4. Call back if diarrhea or rectal bleeding returns      Follow up in about 3 months (around 5/13/2023).      Order summary:  Orders Placed This Encounter    Iron and TIBC    Ferritin    Hemoglobin and Hematocrit    C-Reactive Protein       Thank you for allowing me to participate in the care of Isabella MOE Kelly.      LORA NevilleC

## 2023-02-14 ENCOUNTER — PATIENT MESSAGE (OUTPATIENT)
Dept: GASTROENTEROLOGY | Facility: CLINIC | Age: 68
End: 2023-02-14
Payer: MEDICARE

## 2023-02-14 RX ORDER — SODIUM CHLORIDE 9 MG/ML
10 INJECTION, SOLUTION INTRAMUSCULAR; INTRAVENOUS; SUBCUTANEOUS
Status: CANCELLED | OUTPATIENT
Start: 2023-02-14

## 2023-02-14 RX ORDER — HEPARIN 100 UNIT/ML
500 SYRINGE INTRAVENOUS
Status: CANCELLED | OUTPATIENT
Start: 2023-02-14

## 2023-02-23 ENCOUNTER — INFUSION (OUTPATIENT)
Dept: INFUSION THERAPY | Facility: HOSPITAL | Age: 68
End: 2023-02-23
Attending: NURSE PRACTITIONER
Payer: MEDICARE

## 2023-02-23 VITALS
OXYGEN SATURATION: 100 % | HEART RATE: 71 BPM | DIASTOLIC BLOOD PRESSURE: 88 MMHG | TEMPERATURE: 98 F | SYSTOLIC BLOOD PRESSURE: 155 MMHG | RESPIRATION RATE: 18 BRPM

## 2023-02-23 DIAGNOSIS — D50.0 IRON DEFICIENCY ANEMIA DUE TO CHRONIC BLOOD LOSS: Primary | ICD-10-CM

## 2023-02-23 DIAGNOSIS — M25.552 LEFT HIP PAIN: Primary | ICD-10-CM

## 2023-02-23 PROCEDURE — 25000003 PHARM REV CODE 250: Performed by: NURSE PRACTITIONER

## 2023-02-23 PROCEDURE — 63600175 PHARM REV CODE 636 W HCPCS: Mod: JG | Performed by: NURSE PRACTITIONER

## 2023-02-23 PROCEDURE — 96365 THER/PROPH/DIAG IV INF INIT: CPT

## 2023-02-23 PROCEDURE — 96366 THER/PROPH/DIAG IV INF ADDON: CPT

## 2023-02-23 RX ORDER — SODIUM CHLORIDE 9 MG/ML
10 INJECTION, SOLUTION INTRAMUSCULAR; INTRAVENOUS; SUBCUTANEOUS
Status: DISCONTINUED | OUTPATIENT
Start: 2023-02-23 | End: 2023-02-23 | Stop reason: HOSPADM

## 2023-02-23 RX ORDER — SODIUM CHLORIDE 9 MG/ML
10 INJECTION, SOLUTION INTRAMUSCULAR; INTRAVENOUS; SUBCUTANEOUS
Status: CANCELLED | OUTPATIENT
Start: 2023-02-23

## 2023-02-23 RX ORDER — HEPARIN 100 UNIT/ML
500 SYRINGE INTRAVENOUS
Status: CANCELLED | OUTPATIENT
Start: 2023-02-23

## 2023-02-23 RX ADMIN — SODIUM CHLORIDE 25 MG: 9 INJECTION, SOLUTION INTRAVENOUS at 09:02

## 2023-02-23 RX ADMIN — SODIUM CHLORIDE 300 MG: 9 INJECTION, SOLUTION INTRAVENOUS at 10:02

## 2023-02-23 NOTE — PROGRESS NOTES
0900: Pt here for Infed infusion, resting in recliner, TP released and pharmacy notified   0927: Infed test dose infusing at 600 ml/hr over 10 mins per guardrails.  0936: Infed complete. Inst pt to monitor for any adverse reactions. Voiced understanding.  Pharm notified of test dose completion.  1003: no reaction noted at present. Pharm notified that the patient is ready for her full dose.  1018:Infed infusing.  1237:Infed complete.  1253:pt discharged ambulatory with no adverse reaction noted, pt notified to go to ER with any adverse reactions, AVS given along with medication information.

## 2023-02-27 ENCOUNTER — OFFICE VISIT (OUTPATIENT)
Dept: ORTHOPEDICS | Facility: CLINIC | Age: 68
End: 2023-02-27
Payer: MEDICARE

## 2023-02-27 ENCOUNTER — HOSPITAL ENCOUNTER (OUTPATIENT)
Dept: RADIOLOGY | Facility: HOSPITAL | Age: 68
Discharge: HOME | End: 2023-02-27
Attending: ORTHOPAEDIC SURGERY
Payer: MEDICARE

## 2023-02-27 DIAGNOSIS — M70.62 TROCHANTERIC BURSITIS OF LEFT HIP: Primary | ICD-10-CM

## 2023-02-27 DIAGNOSIS — M25.552 LEFT HIP PAIN: ICD-10-CM

## 2023-02-27 PROCEDURE — 99214 OFFICE O/P EST MOD 30 MIN: CPT | Mod: S$PBB,25,, | Performed by: NURSE PRACTITIONER

## 2023-02-27 PROCEDURE — 20610 DRAIN/INJ JOINT/BURSA W/O US: CPT | Mod: PBBFAC,LT

## 2023-02-27 PROCEDURE — 96372 THER/PROPH/DIAG INJ SC/IM: CPT | Mod: PBBFAC | Performed by: NURSE PRACTITIONER

## 2023-02-27 PROCEDURE — 99213 OFFICE O/P EST LOW 20 MIN: CPT | Mod: PBBFAC,25 | Performed by: NURSE PRACTITIONER

## 2023-02-27 PROCEDURE — 99214 PR OFFICE/OUTPT VISIT, EST, LEVL IV, 30-39 MIN: ICD-10-PCS | Mod: S$PBB,25,, | Performed by: NURSE PRACTITIONER

## 2023-02-27 PROCEDURE — 73502 X-RAY EXAM HIP UNI 2-3 VIEWS: CPT | Mod: TC,LT

## 2023-02-27 PROCEDURE — 73502 X-RAY EXAM HIP UNI 2-3 VIEWS: CPT | Mod: 26,LT,, | Performed by: ORTHOPAEDIC SURGERY

## 2023-02-27 PROCEDURE — 73502 XR HIP WITH PELVIS WHEN PERFORMED, 2 OR 3 VIEWS LEFT: ICD-10-PCS | Mod: 26,LT,, | Performed by: ORTHOPAEDIC SURGERY

## 2023-02-27 RX ORDER — BUPIVACAINE HYDROCHLORIDE 2.5 MG/ML
1 INJECTION, SOLUTION INFILTRATION; PERINEURAL
Status: COMPLETED | OUTPATIENT
Start: 2023-02-27 | End: 2023-02-27

## 2023-02-27 RX ORDER — TRIAMCINOLONE ACETONIDE 40 MG/ML
40 INJECTION, SUSPENSION INTRA-ARTICULAR; INTRAMUSCULAR
Status: COMPLETED | OUTPATIENT
Start: 2023-02-27 | End: 2023-02-27

## 2023-02-27 RX ADMIN — BUPIVACAINE HYDROCHLORIDE 2.5 MG: 2.5 INJECTION, SOLUTION INFILTRATION; PERINEURAL at 10:02

## 2023-02-27 RX ADMIN — TRIAMCINOLONE ACETONIDE 40 MG: 40 INJECTION, SUSPENSION INTRA-ARTICULAR; INTRAMUSCULAR at 10:02

## 2023-02-27 NOTE — PATIENT INSTRUCTIONS
Left trochanteric bursa injection.  Left lateral thigh prepped with Betadine.  Intrabursal triamcinolone 1 cc 40 milligrams/milliliter and bupivacaine 0.25% 1 mL injected with 22 gauge needle without difficulty.  Patient tolerated procedure without difficulty.  Band-Aid applied.  Follow-up orthopedic clinic as needed for left trochanteric bursa.  Return to orthopedic clinic in 6 months bilateral knee x-rays.  May return sooner as needed.

## 2023-02-27 NOTE — PROGRESS NOTES
67-year-old female presents ambulatory to orthopedic clinic requesting intrabursal injection of the left trochanteric bursa.  She is known to orthopedic clinic being followed for trochanteric bursitis.  It has been greater than 4 months since her last injection.  States she was at [facility] with the grandson in walking has caused her to have discomfort and pain.  She denies fever, chills or any sign or symptom flexion.  She is also known to orthopedic clinic having had bilateral total knee replacement arthroplasties in the past.  States she is doing well respect to her knees.  She is well pleased with results.      X-ray: X-rays today left hip two views AP and lateral view reviewed by Dr. Langston.      Review of the two view x-ray AP lateral left hip shows mild DJD changes.  No evidence acute fracture, dislocation or pathologic bone noted.      PE:  She is noted be ambulating independently with no perceptible limp.  Physical exam left lower extremity leg lengths appear equal.  She has good motion left hip without elicitation of pain.  There is mild tenderness to palpation over the lateral thigh in the area of the greater trochanter.    Impression:  Trochanteric bursitis-left     Plan:  Left trochanteric bursa injection.  Left lateral thigh prepped with Betadine.  Intrabursal triamcinolone 1 cc 40 milligrams/milliliter and bupivacaine 0.25% 1 mL injected with 22 gauge needle without difficulty.  Patient tolerated procedure without difficulty.  Band-Aid applied.  Follow-up orthopedic clinic as needed for left trochanteric bursa.  Return to orthopedic clinic in 6 months bilateral knee x-rays.  May return sooner as needed.   difficulty breathing

## 2023-03-02 ENCOUNTER — INFUSION (OUTPATIENT)
Dept: INFUSION THERAPY | Facility: HOSPITAL | Age: 68
End: 2023-03-02
Attending: NURSE PRACTITIONER
Payer: MEDICARE

## 2023-03-02 VITALS
RESPIRATION RATE: 18 BRPM | DIASTOLIC BLOOD PRESSURE: 84 MMHG | HEART RATE: 76 BPM | TEMPERATURE: 98 F | SYSTOLIC BLOOD PRESSURE: 151 MMHG | OXYGEN SATURATION: 97 %

## 2023-03-02 DIAGNOSIS — Z96.652 H/O TOTAL KNEE REPLACEMENT, LEFT: Primary | ICD-10-CM

## 2023-03-02 PROCEDURE — 63600175 PHARM REV CODE 636 W HCPCS: Mod: JZ,JG | Performed by: NURSE PRACTITIONER

## 2023-03-02 PROCEDURE — 25000003 PHARM REV CODE 250: Performed by: NURSE PRACTITIONER

## 2023-03-02 PROCEDURE — 96365 THER/PROPH/DIAG IV INF INIT: CPT

## 2023-03-02 RX ORDER — SODIUM CHLORIDE 0.9 % (FLUSH) 0.9 %
10 SYRINGE (ML) INJECTION
Status: DISCONTINUED | OUTPATIENT
Start: 2023-03-02 | End: 2023-03-02 | Stop reason: HOSPADM

## 2023-03-02 RX ORDER — CETIRIZINE HYDROCHLORIDE 10 MG/1
10 TABLET ORAL ONCE
Status: CANCELLED | OUTPATIENT
Start: 2023-03-09 | End: 2023-03-09

## 2023-03-02 RX ORDER — DIPHENHYDRAMINE HYDROCHLORIDE 50 MG/ML
25 INJECTION INTRAMUSCULAR; INTRAVENOUS ONCE
Status: ACTIVE | OUTPATIENT
Start: 2023-03-02

## 2023-03-02 RX ORDER — SODIUM CHLORIDE 0.9 % (FLUSH) 0.9 %
10 SYRINGE (ML) INJECTION
Status: CANCELLED | OUTPATIENT
Start: 2023-03-09

## 2023-03-02 RX ORDER — IPRATROPIUM BROMIDE AND ALBUTEROL SULFATE 2.5; .5 MG/3ML; MG/3ML
3 SOLUTION RESPIRATORY (INHALATION)
Status: CANCELLED | OUTPATIENT
Start: 2023-03-09

## 2023-03-02 RX ORDER — ACETAMINOPHEN 325 MG/1
650 TABLET ORAL ONCE
Status: DISCONTINUED | OUTPATIENT
Start: 2023-03-02 | End: 2023-03-02 | Stop reason: HOSPADM

## 2023-03-02 RX ORDER — ACETAMINOPHEN 325 MG/1
650 TABLET ORAL ONCE
Status: CANCELLED | OUTPATIENT
Start: 2023-03-09 | End: 2023-03-09

## 2023-03-02 RX ORDER — CETIRIZINE HYDROCHLORIDE 10 MG/1
10 TABLET ORAL ONCE
Status: DISCONTINUED | OUTPATIENT
Start: 2023-03-02 | End: 2023-03-02 | Stop reason: HOSPADM

## 2023-03-02 RX ORDER — ACETAMINOPHEN 325 MG/1
650 TABLET ORAL
Status: CANCELLED | OUTPATIENT
Start: 2023-03-09

## 2023-03-02 RX ORDER — METHYLPREDNISOLONE SOD SUCC 125 MG
40 VIAL (EA) INJECTION
Status: CANCELLED | OUTPATIENT
Start: 2023-03-09

## 2023-03-02 RX ORDER — EPINEPHRINE 1 MG/ML
0.3 INJECTION, SOLUTION, CONCENTRATE INTRAVENOUS
Status: CANCELLED | OUTPATIENT
Start: 2023-03-09

## 2023-03-02 RX ORDER — DIPHENHYDRAMINE HYDROCHLORIDE 50 MG/ML
25 INJECTION INTRAMUSCULAR; INTRAVENOUS
Status: CANCELLED | OUTPATIENT
Start: 2023-03-09

## 2023-03-02 RX ADMIN — VEDOLIZUMAB 300 MG: 300 INJECTION, POWDER, LYOPHILIZED, FOR SOLUTION INTRAVENOUS at 08:03

## 2023-03-02 NOTE — PROGRESS NOTES
0810-Pt here for entyvio  infusion started infusion, resting in recliner, TP released and pharmacy notified   0825-entyvio infusion started   0855- entyvio  0910- pt discharged ambulatory with no adverse reaction noted, pt notified to go to ER with any adverse reactions, AVS given along with medication information infusion complete, tolerated well, will continue to monitor

## 2023-03-20 PROBLEM — K51.011 ULCERATIVE PANCOLITIS WITH RECTAL BLEEDING: Status: RESOLVED | Noted: 2022-12-19 | Resolved: 2023-03-20

## 2023-04-17 PROBLEM — Z00.00 ENCOUNTER FOR GENERAL ADULT MEDICAL EXAMINATION WITHOUT ABNORMAL FINDINGS: Status: RESOLVED | Noted: 2023-01-13 | Resolved: 2023-04-17

## 2023-04-27 ENCOUNTER — INFUSION (OUTPATIENT)
Dept: INFUSION THERAPY | Facility: HOSPITAL | Age: 68
End: 2023-04-27
Attending: NURSE PRACTITIONER
Payer: MEDICARE

## 2023-04-27 VITALS
TEMPERATURE: 98 F | RESPIRATION RATE: 17 BRPM | DIASTOLIC BLOOD PRESSURE: 86 MMHG | OXYGEN SATURATION: 97 % | HEART RATE: 79 BPM | SYSTOLIC BLOOD PRESSURE: 158 MMHG

## 2023-04-27 DIAGNOSIS — Z96.652 H/O TOTAL KNEE REPLACEMENT, LEFT: Primary | ICD-10-CM

## 2023-04-27 PROCEDURE — 25000003 PHARM REV CODE 250: Performed by: NURSE PRACTITIONER

## 2023-04-27 PROCEDURE — 63600175 PHARM REV CODE 636 W HCPCS: Mod: JZ,JG | Performed by: NURSE PRACTITIONER

## 2023-04-27 PROCEDURE — 96365 THER/PROPH/DIAG IV INF INIT: CPT

## 2023-04-27 RX ORDER — CETIRIZINE HYDROCHLORIDE 10 MG/1
10 TABLET ORAL ONCE
Status: DISCONTINUED | OUTPATIENT
Start: 2023-04-27 | End: 2023-04-27 | Stop reason: HOSPADM

## 2023-04-27 RX ORDER — METHYLPREDNISOLONE SOD SUCC 125 MG
40 VIAL (EA) INJECTION
Status: CANCELLED | OUTPATIENT
Start: 2023-06-22

## 2023-04-27 RX ORDER — ACETAMINOPHEN 325 MG/1
650 TABLET ORAL
Status: CANCELLED | OUTPATIENT
Start: 2023-06-22

## 2023-04-27 RX ORDER — EPINEPHRINE 1 MG/ML
0.3 INJECTION, SOLUTION, CONCENTRATE INTRAVENOUS
Status: CANCELLED | OUTPATIENT
Start: 2023-06-22

## 2023-04-27 RX ORDER — DIPHENHYDRAMINE HYDROCHLORIDE 50 MG/ML
25 INJECTION INTRAMUSCULAR; INTRAVENOUS ONCE
Status: ACTIVE | OUTPATIENT
Start: 2023-04-27

## 2023-04-27 RX ORDER — ACETAMINOPHEN 325 MG/1
650 TABLET ORAL ONCE
Status: CANCELLED | OUTPATIENT
Start: 2023-06-22 | End: 2023-06-22

## 2023-04-27 RX ORDER — DIPHENHYDRAMINE HYDROCHLORIDE 50 MG/ML
25 INJECTION INTRAMUSCULAR; INTRAVENOUS
Status: CANCELLED | OUTPATIENT
Start: 2023-06-22

## 2023-04-27 RX ORDER — SODIUM CHLORIDE 0.9 % (FLUSH) 0.9 %
10 SYRINGE (ML) INJECTION
Status: CANCELLED | OUTPATIENT
Start: 2023-06-22

## 2023-04-27 RX ORDER — CETIRIZINE HYDROCHLORIDE 10 MG/1
10 TABLET ORAL ONCE
Status: CANCELLED | OUTPATIENT
Start: 2023-06-22 | End: 2023-06-22

## 2023-04-27 RX ORDER — ACETAMINOPHEN 325 MG/1
650 TABLET ORAL ONCE
Status: DISCONTINUED | OUTPATIENT
Start: 2023-04-27 | End: 2023-04-27 | Stop reason: HOSPADM

## 2023-04-27 RX ORDER — IPRATROPIUM BROMIDE AND ALBUTEROL SULFATE 2.5; .5 MG/3ML; MG/3ML
3 SOLUTION RESPIRATORY (INHALATION)
Status: CANCELLED | OUTPATIENT
Start: 2023-06-22

## 2023-04-27 RX ORDER — SODIUM CHLORIDE 0.9 % (FLUSH) 0.9 %
10 SYRINGE (ML) INJECTION
Status: DISCONTINUED | OUTPATIENT
Start: 2023-04-27 | End: 2023-04-27 | Stop reason: HOSPADM

## 2023-04-27 RX ADMIN — VEDOLIZUMAB 300 MG: 300 INJECTION, POWDER, LYOPHILIZED, FOR SOLUTION INTRAVENOUS at 08:04

## 2023-04-27 NOTE — PROGRESS NOTES
0830 Pt here for Entyvio infusion, resting in recliner, TP released and pharmacy notified   0858 Entyvio infusion started to go in over 30 min  0937 Entyvio complete and flushed, tolerated well, will continue to monitor  0945 pt discharged ambulatory with no adverse reaction noted, pt notified to go to ER with any adverse reactions, AVS given along with medication information  Follow up appt made for 8 weeks

## 2023-04-27 NOTE — PATIENT INSTRUCTIONS
Return to Emergency Room if you feel short of breath, any tongue swelling, or feel as though your throat is closing up.

## 2023-05-15 ENCOUNTER — OFFICE VISIT (OUTPATIENT)
Dept: GASTROENTEROLOGY | Facility: CLINIC | Age: 68
End: 2023-05-15
Payer: MEDICARE

## 2023-05-15 VITALS
SYSTOLIC BLOOD PRESSURE: 153 MMHG | WEIGHT: 174.38 LBS | HEIGHT: 68 IN | BODY MASS INDEX: 26.43 KG/M2 | DIASTOLIC BLOOD PRESSURE: 82 MMHG

## 2023-05-15 DIAGNOSIS — K51.30 CHRONIC ULCERATIVE RECTOSIGMOIDITIS WITHOUT COMPLICATIONS: Primary | ICD-10-CM

## 2023-05-15 PROCEDURE — 99214 OFFICE O/P EST MOD 30 MIN: CPT | Mod: S$PBB,,, | Performed by: NURSE PRACTITIONER

## 2023-05-15 PROCEDURE — 99213 OFFICE O/P EST LOW 20 MIN: CPT | Mod: PBBFAC | Performed by: NURSE PRACTITIONER

## 2023-05-15 PROCEDURE — 99214 PR OFFICE/OUTPT VISIT, EST, LEVL IV, 30-39 MIN: ICD-10-PCS | Mod: S$PBB,,, | Performed by: NURSE PRACTITIONER

## 2023-05-15 NOTE — PROGRESS NOTES
Isabella Kelly is a 67 y.o. female here for No chief complaint on file.        PCP: Jack Swift  Referring Provider: No referring provider defined for this encounter.     HPI:  Presents for follow-up due to ulcerative colitis.  Patient reports that she is doing much better without any sign of flares of UC.  Patient is now on Entyvio infusions every 8 weeks.  Denies any side effects from Entyvio.  Last colonoscopy 09/19/2022 she did have moderate chronic colitis.  She does have iron-deficiency anemia and is currently taking oral iron.  Labs from 02/13/2023, reviewed, HGB 8.7 and HCT 29.8, iron 17 and saturation 4%, ferritin 5.  She did have an IV iron infusion.  Denies rectal bleeding, weight loss, and abdominal pain.  No diarrhea.  No NSAID use.        ROS:  Review of Systems   Constitutional:  Negative for appetite change, fatigue, fever and unexpected weight change.   HENT:  Negative for trouble swallowing.    Respiratory:  Negative for shortness of breath.    Cardiovascular:  Negative for chest pain.   Gastrointestinal:  Negative for abdominal pain, blood in stool, change in bowel habit, constipation, diarrhea, nausea, vomiting, reflux and change in bowel habit.   Musculoskeletal:  Negative for gait problem.   Integumentary:  Negative for pallor.   Allergic/Immunologic: Immunocompromised state: on Entyvio infusions for UC, immunosuppression.   Neurological:  Negative for light-headedness.   Psychiatric/Behavioral:  The patient is not nervous/anxious.         PMHX:  has a past medical history of Acquired hypothyroidism (1/13/2023), Bacterial vaginosis, Cystocele with rectocele (11/28/2007), Cystocele, midline (12/12/2007), Depressive disorder (09/2004), Dysmenorrhea (2006), Esophageal reflux, Granulation tissue (08/12/2008), Hypercholesterolemia (09/20/2004), Hyperlipidemia (10/26/2011), Hypertension, Hypertriglyceridemia (10/04/2010), Hypertriglyceridemia (10/04/2010), IBD (inflammatory bowel disease),  "Iron deficiency anemia due to chronic blood loss (12/19/2022), Large uterus, Menorrhagia, Non-healing wound of left lower extremity (12/12/2022), Polymenorrhea, PONV (postoperative nausea and vomiting), Post-hysterectomy menopause, Rectocele, Stress incontinence, Ulcerative colitis (08/2020), Uterine prolapse, and Vaginal discharge.    PSHX:  has a past surgical history that includes anterior/posterior colporrhaphy with vaginal enterocele repair (06/18/2008); Appendectomy; Colonoscopy; Dilation and curettage of uterus; Hysteroscopy (11/28/2007); Endometrial biopsy (02/16/2006); ENDOMETRIAL BIOPSY REPEAT  (11/06/2007); SILVER NITRATE CAUTERY OF VAGINAL WALL (08/12/2008); SIS (11/15/2007); Total abdominal hysterectomy; and TOT SLING (06/18/2008).    PFHX: Family history is unknown by patient.    PSlHX:  reports that she has never smoked. She has never used smokeless tobacco. She reports that she does not drink alcohol and does not use drugs.        Review of patient's allergies indicates:  No Known Allergies    Medication List with Changes/Refills   Current Medications    ESTRADIOL (ESTRACE) 1 MG TABLET    Take 1 tablet (1 mg total) by mouth once daily.    LEVOTHYROXINE (SYNTHROID) 100 MCG TABLET    Take 100 mcg by mouth before breakfast.    LISINOPRIL (PRINIVIL,ZESTRIL) 5 MG TABLET    Take 5 mg by mouth once daily.    OMEPRAZOLE (PRILOSEC) 40 MG CAPSULE    Take 40 mg by mouth 2 (two) times daily.    ROSUVASTATIN (CRESTOR) 10 MG TABLET    TAKE 1 TABLET BY MOUTH EVERY DAY WITH SELENIUM 200 MCG        Objective Findings:  Vital Signs:  BP (!) 153/82   Ht 5' 8" (1.727 m)   Wt 79.1 kg (174 lb 6.4 oz)   BMI 26.52 kg/m²  Body mass index is 26.52 kg/m².    Physical Exam:  Physical Exam  Vitals and nursing note reviewed.   Constitutional:       General: She is not in acute distress.     Appearance: Normal appearance.   HENT:      Mouth/Throat:      Mouth: Mucous membranes are moist.   Cardiovascular:      Rate and Rhythm: " Normal rate.   Pulmonary:      Effort: Pulmonary effort is normal.      Breath sounds: No wheezing, rhonchi or rales.   Abdominal:      General: Bowel sounds are normal. There is no distension.      Palpations: Abdomen is soft. There is no mass.      Tenderness: There is no abdominal tenderness. There is no guarding.   Skin:     General: Skin is warm and dry.      Coloration: Skin is not jaundiced or pale.   Neurological:      Mental Status: She is alert and oriented to person, place, and time.   Psychiatric:         Mood and Affect: Mood normal.        Labs:  Lab Results   Component Value Date    WBC 6.45 05/15/2023    HGB 11.5 (L) 05/15/2023    HCT 36.7 (L) 05/15/2023    MCV 92.0 05/15/2023    RDW 17.4 (H) 05/15/2023     05/15/2023    LYMPH 31.9 05/15/2023    LYMPH 2.06 05/15/2023    MONO 9.0 (H) 05/15/2023    EOS 0.22 05/15/2023    BASO 0.02 05/15/2023     Lab Results   Component Value Date     12/12/2022    K 3.8 12/12/2022     12/12/2022    CO2 28 12/12/2022     12/12/2022    BUN 7 12/12/2022    CREATININE 0.55 12/12/2022    CALCIUM 9.1 12/12/2022    PROT 7.8 12/12/2022    ALBUMIN 3.0 (L) 12/12/2022    BILITOT 0.2 12/12/2022    ALKPHOS 107 12/12/2022    AST 7 (L) 12/12/2022    ALT 13 12/12/2022         Imaging: No results found.      Assessment:  Isabella Kelly is a 67 y.o. female here with:  1. Chronic ulcerative rectosigmoiditis without complications          Recommendations:  1. CBC, CRP, CMP, Iron studies  2. No NSAID'S  3. Continue Entyvio infusion  4. Continue oral iron    Follow up in about 6 months (around 11/15/2023).      Order summary:  Orders Placed This Encounter    C-Reactive Protein    Iron and TIBC    Ferritin    CBC Auto Differential    Comprehensive Metabolic Panel       Thank you for allowing me to participate in the care of Isabella Kelly.      VANDANA Neville

## 2023-06-07 ENCOUNTER — OFFICE VISIT (OUTPATIENT)
Dept: ORTHOPEDICS | Facility: CLINIC | Age: 68
End: 2023-06-07
Payer: MEDICARE

## 2023-06-07 VITALS
OXYGEN SATURATION: 95 % | HEIGHT: 68 IN | SYSTOLIC BLOOD PRESSURE: 122 MMHG | WEIGHT: 174 LBS | HEART RATE: 90 BPM | DIASTOLIC BLOOD PRESSURE: 76 MMHG | BODY MASS INDEX: 26.37 KG/M2 | TEMPERATURE: 98 F | RESPIRATION RATE: 18 BRPM

## 2023-06-07 DIAGNOSIS — G89.29 CHRONIC LEFT-SIDED LOW BACK PAIN WITHOUT SCIATICA: Primary | ICD-10-CM

## 2023-06-07 DIAGNOSIS — M54.50 CHRONIC LEFT-SIDED LOW BACK PAIN WITHOUT SCIATICA: Primary | ICD-10-CM

## 2023-06-07 PROCEDURE — 99214 OFFICE O/P EST MOD 30 MIN: CPT | Mod: S$PBB,,, | Performed by: ORTHOPAEDIC SURGERY

## 2023-06-07 PROCEDURE — 99214 OFFICE O/P EST MOD 30 MIN: CPT | Mod: PBBFAC | Performed by: ORTHOPAEDIC SURGERY

## 2023-06-07 PROCEDURE — 99214 PR OFFICE/OUTPT VISIT, EST, LEVL IV, 30-39 MIN: ICD-10-PCS | Mod: S$PBB,,, | Performed by: ORTHOPAEDIC SURGERY

## 2023-06-07 NOTE — PROGRESS NOTES
CLINIC NOTE       Chief Complaint   Patient presents with    Left Hip - Pain        Isabella Kelly is a 67 y.o. female seen today for evaluation of left hip pain.  She is been treated intermittently for left greater trochanteric bursitis.  Her pain recently however has been more posterior in the buttocks and sciatic notch region.  She was seen by Edwardo HUGHES and underwent corticosteroid injection left greater trochanteric bursa without relief of symptoms.  She is unable to take oral anti-inflammatory medications due to ulcerative colitis.  She denies sciatica like symptoms or sensory changes in lower extremities.  No groin pain.  Previously had x-rays of the pelvis showing no evidence of any arthritis involving either hip on 02/23/2023.  She is known to me having undergone bilateral total knee replacement arthroplasties and has done well.    Past Medical History:   Diagnosis Date    Acquired hypothyroidism 1/13/2023    Bacterial vaginosis     Cystocele with rectocele 11/28/2007    1st degree cystocele; 2nd degree rectocele    Cystocele, midline 12/12/2007    midline    Depressive disorder 09/2004    mild    Dysmenorrhea 2006    severe first day and getting worse    Esophageal reflux     Granulation tissue 08/12/2008    5 - 8 mm long lower post repair granulation tissue - treated with sliver nitrate cautery    Hypercholesterolemia 09/20/2004    cholesterol is 219 mg/dl    Hyperlipidemia 10/26/2011    mile;  TG  71 mg/dl;  HDL  67 mg/dl;  LDL  136 mg/dl;  total cholesterol  217 mg/dl.  10/13/2017:  LDL  163 mg/dl;  total cholesterol  260 mg/dl; triglycerides  186 mg/dl;  HDL  66 mg/dl    Hypertension     Hypertriglyceridemia 10/04/2010    216 mg/dl;  [id number]    Hypertriglyceridemia 10/04/2010    216  mg/dll  02/01/2013 - recent kevek us 238 mg/dl    IBD (inflammatory bowel disease)     Iron deficiency anemia due to chronic blood loss 12/19/2022    Large uterus     Menorrhagia     Non-healing wound  of left lower extremity 12/12/2022    Polymenorrhea     PONV (postoperative nausea and vomiting)     Post-hysterectomy menopause     Rectocele     Stress incontinence     Ulcerative colitis 08/2020    Ulcerative colitis confirmed by colonoscopy by Dr. Ho Capellan    Uterine prolapse     Vaginal discharge      Family History   Family history unknown: Yes     Current Outpatient Medications on File Prior to Visit   Medication Sig Dispense Refill    estradioL (ESTRACE) 1 MG tablet Take 1 tablet (1 mg total) by mouth once daily. 90 tablet 3    levothyroxine (SYNTHROID) 100 MCG tablet Take 100 mcg by mouth before breakfast.      lisinopriL (PRINIVIL,ZESTRIL) 5 MG tablet Take 5 mg by mouth once daily.      omeprazole (PRILOSEC) 40 MG capsule Take 40 mg by mouth 2 (two) times daily.      rosuvastatin (CRESTOR) 10 MG tablet TAKE 1 TABLET BY MOUTH EVERY DAY WITH SELENIUM 200 MCG       Current Facility-Administered Medications on File Prior to Visit   Medication Dose Route Frequency Provider Last Rate Last Admin    diphenhydrAMINE injection 25 mg  25 mg Intravenous Once Erma L Lv, FNP        diphenhydrAMINE injection 25 mg  25 mg Intravenous Once Erma L Lv, FNP        diphenhydrAMINE injection 25 mg  25 mg Intravenous Once Erma L Lv, FNP           ROS     Vitals:    06/07/23 1327   BP: 122/76   Pulse: 90   Resp: 18   Temp: 97.9 °F (36.6 °C)       Past Surgical History:   Procedure Laterality Date    anterior/posterior colporrhaphy with vaginal enterocele repair  06/18/2008    APPENDECTOMY      COLONOSCOPY      DILATION AND CURETTAGE OF UTERUS      [id number]    ENDOMETRIAL BIOPSY  02/16/2006    ENDOMETRIAL BIOPSY REPEAT   11/06/2007    HYSTEROSCOPY  11/28/2007    SILVER NITRATE CAUTERY OF VAGINAL WALL  08/12/2008    SIS  11/15/2007    TOT SLING  06/18/2008    TOTAL ABDOMINAL HYSTERECTOMY          Review of patient's allergies indicates:  No Known Allergies     Ortho Exam well-developed well-nourished   female no acute distress.  Alert oriented cooperative.  She ambulates independently with no perceptible limp.  Leg lengths are equal.  No significant tenderness palpation over the left GT bursal region.  There is tenderness palpation of the left sciatic notch and lumbosacral region.  Radiographic Examination:    Technique:    Findings:    Impression:   See Above    Assessment and Plan  Patient Active Problem List    Diagnosis Date Noted    Acquired hypothyroidism 01/13/2023    Anxiety 01/13/2023    Chronic pain 01/13/2023    Chronic ulcerative rectosigmoiditis 01/13/2023    Diverticulosis of colon 01/13/2023    Hyperlipidemia 01/13/2023    Neck sprain 01/13/2023    Hormone replacement therapy 01/13/2023    Primary hypertension 01/13/2023    Arthritis 01/13/2023    Osteoarthritis of knee 01/13/2023    Iron deficiency anemia due to chronic blood loss 12/19/2022    Ulcerative colitis with complication 12/12/2022    Non-healing wound of left lower extremity 12/12/2022    H/O total knee replacement, left 10/14/2022    Trochanteric bursitis of left hip 06/06/2022    History of total right knee replacement 10/13/2021    Primary osteoarthritis of right knee 07/19/2021    Impression:  Low back pain  Plan:  Discussed the option for Pain Center referral and epidural steroid injections.  She is not feel that her symptoms are frequent or intense enough to consider injections at this time.  She may in the experiences pain when she 1st gets up to walk and does not have significant restriction of desired activities of daily living    Deep Langston M.D.

## 2023-06-28 ENCOUNTER — INFUSION (OUTPATIENT)
Dept: INFUSION THERAPY | Facility: HOSPITAL | Age: 68
End: 2023-06-28
Attending: NURSE PRACTITIONER
Payer: MEDICARE

## 2023-06-28 VITALS
TEMPERATURE: 98 F | DIASTOLIC BLOOD PRESSURE: 84 MMHG | RESPIRATION RATE: 17 BRPM | HEART RATE: 85 BPM | SYSTOLIC BLOOD PRESSURE: 141 MMHG | OXYGEN SATURATION: 98 %

## 2023-06-28 DIAGNOSIS — Z96.652 H/O TOTAL KNEE REPLACEMENT, LEFT: Primary | ICD-10-CM

## 2023-06-28 PROCEDURE — 25000003 PHARM REV CODE 250: Performed by: NURSE PRACTITIONER

## 2023-06-28 PROCEDURE — 96365 THER/PROPH/DIAG IV INF INIT: CPT

## 2023-06-28 PROCEDURE — 63600175 PHARM REV CODE 636 W HCPCS: Mod: JZ,JG | Performed by: NURSE PRACTITIONER

## 2023-06-28 RX ORDER — EPINEPHRINE 1 MG/ML
0.3 INJECTION, SOLUTION, CONCENTRATE INTRAVENOUS
Status: CANCELLED | OUTPATIENT
Start: 2023-08-16

## 2023-06-28 RX ORDER — METHYLPREDNISOLONE SOD SUCC 125 MG
40 VIAL (EA) INJECTION
Status: CANCELLED | OUTPATIENT
Start: 2023-08-16

## 2023-06-28 RX ORDER — IPRATROPIUM BROMIDE AND ALBUTEROL SULFATE 2.5; .5 MG/3ML; MG/3ML
3 SOLUTION RESPIRATORY (INHALATION)
Status: CANCELLED | OUTPATIENT
Start: 2023-08-16

## 2023-06-28 RX ORDER — CETIRIZINE HYDROCHLORIDE 10 MG/1
10 TABLET ORAL ONCE
Status: DISCONTINUED | OUTPATIENT
Start: 2023-06-28 | End: 2023-06-28 | Stop reason: HOSPADM

## 2023-06-28 RX ORDER — DIPHENHYDRAMINE HYDROCHLORIDE 50 MG/ML
25 INJECTION INTRAMUSCULAR; INTRAVENOUS
Status: CANCELLED | OUTPATIENT
Start: 2023-08-16

## 2023-06-28 RX ORDER — ACETAMINOPHEN 325 MG/1
650 TABLET ORAL
Status: CANCELLED | OUTPATIENT
Start: 2023-08-16

## 2023-06-28 RX ORDER — ACETAMINOPHEN 325 MG/1
650 TABLET ORAL ONCE
Status: DISCONTINUED | OUTPATIENT
Start: 2023-06-28 | End: 2023-06-28 | Stop reason: HOSPADM

## 2023-06-28 RX ORDER — SODIUM CHLORIDE 0.9 % (FLUSH) 0.9 %
10 SYRINGE (ML) INJECTION
Status: CANCELLED | OUTPATIENT
Start: 2023-08-16

## 2023-06-28 RX ORDER — SODIUM CHLORIDE 0.9 % (FLUSH) 0.9 %
10 SYRINGE (ML) INJECTION
Status: DISCONTINUED | OUTPATIENT
Start: 2023-06-28 | End: 2023-06-28 | Stop reason: HOSPADM

## 2023-06-28 RX ORDER — CETIRIZINE HYDROCHLORIDE 10 MG/1
10 TABLET ORAL ONCE
Status: CANCELLED | OUTPATIENT
Start: 2023-08-16 | End: 2023-08-16

## 2023-06-28 RX ORDER — ACETAMINOPHEN 325 MG/1
650 TABLET ORAL ONCE
Status: CANCELLED | OUTPATIENT
Start: 2023-08-16 | End: 2023-08-16

## 2023-06-28 RX ADMIN — VEDOLIZUMAB 300 MG: 300 INJECTION, POWDER, LYOPHILIZED, FOR SOLUTION INTRAVENOUS at 09:06

## 2023-06-28 NOTE — PROGRESS NOTES
0835 Pt here for Entyvio infusion, resting in recliner, TP released and pharmacy notified   0901 Entyvio infusion started to go in over 30 min  0935 Entyvio infusion complete, tolerated well, will continue to monitor  0950 pt discharged ambulatory with no adverse reaction noted, pt notified to go to ER with any adverse reactions, AVS given along with delayed transfusion reaction information, pt verbalized understanding  Follow up appt made for 8 weeks

## 2023-08-23 ENCOUNTER — INFUSION (OUTPATIENT)
Dept: INFUSION THERAPY | Facility: HOSPITAL | Age: 68
End: 2023-08-23
Attending: NURSE PRACTITIONER
Payer: MEDICARE

## 2023-08-23 VITALS
SYSTOLIC BLOOD PRESSURE: 148 MMHG | RESPIRATION RATE: 17 BRPM | HEART RATE: 80 BPM | DIASTOLIC BLOOD PRESSURE: 90 MMHG | TEMPERATURE: 98 F | OXYGEN SATURATION: 100 %

## 2023-08-23 DIAGNOSIS — Z96.652 H/O TOTAL KNEE REPLACEMENT, LEFT: Primary | ICD-10-CM

## 2023-08-23 PROCEDURE — 63600175 PHARM REV CODE 636 W HCPCS: Mod: JZ,JG | Performed by: NURSE PRACTITIONER

## 2023-08-23 PROCEDURE — 25000003 PHARM REV CODE 250: Performed by: NURSE PRACTITIONER

## 2023-08-23 PROCEDURE — 96365 THER/PROPH/DIAG IV INF INIT: CPT

## 2023-08-23 RX ORDER — IPRATROPIUM BROMIDE AND ALBUTEROL SULFATE 2.5; .5 MG/3ML; MG/3ML
3 SOLUTION RESPIRATORY (INHALATION)
Status: CANCELLED | OUTPATIENT
Start: 2023-10-18

## 2023-08-23 RX ORDER — ACETAMINOPHEN 325 MG/1
650 TABLET ORAL
Status: CANCELLED | OUTPATIENT
Start: 2023-10-18

## 2023-08-23 RX ORDER — SODIUM CHLORIDE 0.9 % (FLUSH) 0.9 %
10 SYRINGE (ML) INJECTION
Status: CANCELLED | OUTPATIENT
Start: 2023-10-18

## 2023-08-23 RX ORDER — METHYLPREDNISOLONE SOD SUCC 125 MG
40 VIAL (EA) INJECTION
Status: CANCELLED | OUTPATIENT
Start: 2023-10-18

## 2023-08-23 RX ORDER — CETIRIZINE HYDROCHLORIDE 10 MG/1
10 TABLET ORAL ONCE
Status: DISCONTINUED | OUTPATIENT
Start: 2023-08-23 | End: 2023-08-23 | Stop reason: HOSPADM

## 2023-08-23 RX ORDER — EPINEPHRINE 1 MG/ML
0.3 INJECTION, SOLUTION, CONCENTRATE INTRAVENOUS
Status: CANCELLED | OUTPATIENT
Start: 2023-10-18

## 2023-08-23 RX ORDER — ACETAMINOPHEN 325 MG/1
650 TABLET ORAL ONCE
Status: DISCONTINUED | OUTPATIENT
Start: 2023-08-23 | End: 2023-08-23 | Stop reason: HOSPADM

## 2023-08-23 RX ORDER — ACETAMINOPHEN 325 MG/1
650 TABLET ORAL ONCE
Status: CANCELLED | OUTPATIENT
Start: 2023-10-18 | End: 2023-10-18

## 2023-08-23 RX ORDER — CETIRIZINE HYDROCHLORIDE 10 MG/1
10 TABLET ORAL ONCE
Status: CANCELLED | OUTPATIENT
Start: 2023-10-18 | End: 2023-10-18

## 2023-08-23 RX ORDER — SODIUM CHLORIDE 0.9 % (FLUSH) 0.9 %
10 SYRINGE (ML) INJECTION
Status: DISCONTINUED | OUTPATIENT
Start: 2023-08-23 | End: 2023-08-23 | Stop reason: HOSPADM

## 2023-08-23 RX ORDER — DIPHENHYDRAMINE HYDROCHLORIDE 50 MG/ML
25 INJECTION INTRAMUSCULAR; INTRAVENOUS
Status: CANCELLED | OUTPATIENT
Start: 2023-10-18

## 2023-08-23 RX ADMIN — VEDOLIZUMAB 300 MG: 300 INJECTION, POWDER, LYOPHILIZED, FOR SOLUTION INTRAVENOUS at 09:08

## 2023-08-23 NOTE — PROGRESS NOTES
0830 Pt here for Entyvio infusion, resting in recliner, TP released and pharmacy notified   0919 Entyvio infusion started to go in over 30 min  0955 Entyvio infusion complete, tolerated well, will continue to monitor  1005 pt discharged ambulatory with no adverse reaction noted, pt notified to go to ER with any adverse reactions, AVS given along with medication information  Follow up appt made for 8 weeks

## 2023-08-24 DIAGNOSIS — Z47.89 ORTHOPEDIC AFTERCARE: Primary | ICD-10-CM

## 2023-08-28 ENCOUNTER — OFFICE VISIT (OUTPATIENT)
Dept: ORTHOPEDICS | Facility: CLINIC | Age: 68
End: 2023-08-28
Payer: MEDICARE

## 2023-08-28 ENCOUNTER — HOSPITAL ENCOUNTER (OUTPATIENT)
Dept: RADIOLOGY | Facility: HOSPITAL | Age: 68
Discharge: HOME | End: 2023-08-28
Attending: ORTHOPAEDIC SURGERY
Payer: MEDICARE

## 2023-08-28 DIAGNOSIS — Z47.89 ORTHOPEDIC AFTERCARE: ICD-10-CM

## 2023-08-28 DIAGNOSIS — M70.62 GREATER TROCHANTERIC BURSITIS OF LEFT HIP: Primary | ICD-10-CM

## 2023-08-28 PROCEDURE — 73562 XR KNEE AP LAT WITH SUNRISE BILAT: ICD-10-PCS | Mod: 26,50,, | Performed by: ORTHOPAEDIC SURGERY

## 2023-08-28 PROCEDURE — 20610 DRAIN/INJ JOINT/BURSA W/O US: CPT | Mod: PBBFAC,LT | Performed by: ORTHOPAEDIC SURGERY

## 2023-08-28 PROCEDURE — 99999PBSHW PR PBB SHADOW TECHNICAL ONLY FILED TO HB: ICD-10-PCS | Mod: PBBFAC,,,

## 2023-08-28 PROCEDURE — 73562 X-RAY EXAM OF KNEE 3: CPT | Mod: 26,50,, | Performed by: ORTHOPAEDIC SURGERY

## 2023-08-28 PROCEDURE — 20610 PR DRAIN/INJECT LARGE JOINT/BURSA: ICD-10-PCS | Mod: S$PBB,LT,, | Performed by: ORTHOPAEDIC SURGERY

## 2023-08-28 PROCEDURE — 20610 DRAIN/INJ JOINT/BURSA W/O US: CPT | Mod: S$PBB,LT,, | Performed by: ORTHOPAEDIC SURGERY

## 2023-08-28 PROCEDURE — 73562 X-RAY EXAM OF KNEE 3: CPT | Mod: TC,50

## 2023-08-28 PROCEDURE — 99214 OFFICE O/P EST MOD 30 MIN: CPT | Mod: S$PBB,25,, | Performed by: ORTHOPAEDIC SURGERY

## 2023-08-28 PROCEDURE — 99213 OFFICE O/P EST LOW 20 MIN: CPT | Mod: PBBFAC,25 | Performed by: ORTHOPAEDIC SURGERY

## 2023-08-28 PROCEDURE — 99999PBSHW PR PBB SHADOW TECHNICAL ONLY FILED TO HB: Mod: PBBFAC,,,

## 2023-08-28 PROCEDURE — 99214 PR OFFICE/OUTPT VISIT, EST, LEVL IV, 30-39 MIN: ICD-10-PCS | Mod: S$PBB,25,, | Performed by: ORTHOPAEDIC SURGERY

## 2023-08-28 RX ORDER — BUPIVACAINE HYDROCHLORIDE 2.5 MG/ML
1 INJECTION, SOLUTION INFILTRATION; PERINEURAL
Status: COMPLETED | OUTPATIENT
Start: 2023-08-28 | End: 2023-08-28

## 2023-08-28 RX ORDER — TRIAMCINOLONE ACETONIDE 40 MG/ML
40 INJECTION, SUSPENSION INTRA-ARTICULAR; INTRAMUSCULAR
Status: COMPLETED | OUTPATIENT
Start: 2023-08-28 | End: 2023-08-28

## 2023-08-28 RX ADMIN — BUPIVACAINE HYDROCHLORIDE 2.5 MG: 2.5 INJECTION, SOLUTION INFILTRATION; PERINEURAL at 10:08

## 2023-08-28 RX ADMIN — TRIAMCINOLONE ACETONIDE 40 MG: 40 INJECTION, SUSPENSION INTRA-ARTICULAR; INTRAMUSCULAR at 10:08

## 2023-08-28 NOTE — PROGRESS NOTES
CLINIC NOTE       Chief Complaint   Patient presents with    Left Knee - Follow-up    Right Knee - Follow-up        Isabella Kelly is a 67 y.o. female seen today for recheck of both knees.  She is known to me having undergone left total knee replacement arthroplasty in May of 2020 and right total knee replacement arthroplasty in August of 2021.  She continues do well with respect to both knees.  She ambulates independently with no perceptible limp.  She is here for a yearly recheck of her knees with x-rays.  Additionally however she is having some recurrent pain over the left GT bursal region.  She did have a corticosteroid injection in the bursa in the remote past.  She also had an episode in the past of lumbosacral pain.    Past Medical History:   Diagnosis Date    Acquired hypothyroidism 1/13/2023    Bacterial vaginosis     Cystocele with rectocele 11/28/2007    1st degree cystocele; 2nd degree rectocele    Cystocele, midline 12/12/2007    midline    Depressive disorder 09/2004    mild    Dysmenorrhea 2006    severe first day and getting worse    Esophageal reflux     Granulation tissue 08/12/2008    5 - 8 mm long lower post repair granulation tissue - treated with sliver nitrate cautery    Hypercholesterolemia 09/20/2004    cholesterol is 219 mg/dl    Hyperlipidemia 10/26/2011    mile;  TG  71 mg/dl;  HDL  67 mg/dl;  LDL  136 mg/dl;  total cholesterol  217 mg/dl.  10/13/2017:  LDL  163 mg/dl;  total cholesterol  260 mg/dl; triglycerides  186 mg/dl;  HDL  66 mg/dl    Hypertension     Hypertriglyceridemia 10/04/2010    216 mg/dl;  [id number]    Hypertriglyceridemia 10/04/2010    216  mg/dll  02/01/2013 - recent kevek us 238 mg/dl    IBD (inflammatory bowel disease)     Iron deficiency anemia due to chronic blood loss 12/19/2022    Large uterus     Menorrhagia     Non-healing wound of left lower extremity 12/12/2022    Polymenorrhea     PONV (postoperative nausea and vomiting)     Post-hysterectomy  menopause     Rectocele     Stress incontinence     Ulcerative colitis 08/2020    Ulcerative colitis confirmed by colonoscopy by Dr. Ho Capellan    Uterine prolapse     Vaginal discharge      Family History   Family history unknown: Yes     Current Outpatient Medications on File Prior to Visit   Medication Sig Dispense Refill    estradioL (ESTRACE) 1 MG tablet Take 1 tablet (1 mg total) by mouth once daily. 90 tablet 3    levothyroxine (SYNTHROID) 100 MCG tablet Take 100 mcg by mouth before breakfast.      lisinopriL (PRINIVIL,ZESTRIL) 5 MG tablet Take 5 mg by mouth once daily.      omeprazole (PRILOSEC) 40 MG capsule Take 40 mg by mouth 2 (two) times daily.      rosuvastatin (CRESTOR) 10 MG tablet TAKE 1 TABLET BY MOUTH EVERY DAY WITH SELENIUM 200 MCG       Current Facility-Administered Medications on File Prior to Visit   Medication Dose Route Frequency Provider Last Rate Last Admin    diphenhydrAMINE injection 25 mg  25 mg Intravenous Once Erma Awan, SAUL        diphenhydrAMINE injection 25 mg  25 mg Intravenous Once Erma Awan, SAUL        diphenhydrAMINE injection 25 mg  25 mg Intravenous Once Erma Awan FNP           ROS     There were no vitals filed for this visit.    Past Surgical History:   Procedure Laterality Date    anterior/posterior colporrhaphy with vaginal enterocele repair  06/18/2008    APPENDECTOMY      COLONOSCOPY      DILATION AND CURETTAGE OF UTERUS      [id number]    ENDOMETRIAL BIOPSY  02/16/2006    ENDOMETRIAL BIOPSY REPEAT   11/06/2007    HYSTEROSCOPY  11/28/2007    SILVER NITRATE CAUTERY OF VAGINAL WALL  08/12/2008    SIS  11/15/2007    TOT SLING  06/18/2008    TOTAL ABDOMINAL HYSTERECTOMY          Review of patient's allergies indicates:  No Known Allergies     Ortho Exam :  Well-developed well-nourished  female no acute distress.  She is alert oriented cooperative.  She ambulates independently with no perceptible limp.  Neck is supple without JVD.  Breathing  is regular nonlabored.  Skin is warm and dry no lesions seen.  Leg lengths are equal.  Right knee incision well healed without signs of infection.  Knee motion is 0-130 degrees of flexion.  She has excellent medial and lateral ligamentous balance and stability.  Patella tracks well in the midline.  Left knee shows no effusion or signs of infection.  Well-healed anterior midline incision.  Knee motion 0-130 degrees of flexion.  Knee ligaments stable and well balanced clinically.  Patella tracks well in the midline.  She is tender to palpation over the left GT bursal region reproducing symptoms.  No significant pain with hip flexion internal rotation.  No groin pain elicited.    Radiographic Examination:  Right knee 08/28/2023    Technique:  Three views AP lateral and patellar    Findings:  Bones well mineralized.  Metallic arthroplasty components are in expected position alignment for total knee replacement arthroplasty.  Patella seen midline sulcus on sunrise view.    Impression:   See Above  Radiographic examination: Left knee :  08/28/2023  Technique:  Three views AP lateral patellar  Findings: The bones are well mineralized.  Metallic arthroplasty components are in expected position alignment for total knee replacement arthroplasty.  The patella seen midline sulcus on sunrise view.  Impression: See above  Assessment and Plan  Patient Active Problem List    Diagnosis Date Noted    Acquired hypothyroidism 01/13/2023    Anxiety 01/13/2023    Chronic left-sided low back pain without sciatica 01/13/2023    Chronic ulcerative rectosigmoiditis 01/13/2023    Diverticulosis of colon 01/13/2023    Hyperlipidemia 01/13/2023    Neck sprain 01/13/2023    Hormone replacement therapy 01/13/2023    Primary hypertension 01/13/2023    Arthritis 01/13/2023    Osteoarthritis of knee 01/13/2023    Iron deficiency anemia due to chronic blood loss 12/19/2022    Ulcerative colitis with complication 12/12/2022    Non-healing wound of  left lower extremity 12/12/2022    H/O total knee replacement, left 10/14/2022    Trochanteric bursitis of left hip 06/06/2022    History of total right knee replacement 10/13/2021    Primary osteoarthritis of right knee 07/19/2021    Impression:  1. Status post bilateral TKR doing well 2.  Left GT bursitis  Plan:  Left proximal lateral thigh was prepped with Betadine and GT bursal injection performed with triamcinolone and Marcaine 1 cc each.  She is to avoid sleeping on the left side or 80 duction of the left hip.  She has a history of ulcerative colitis and avoiding oral anti-inflammatory medications.  Voltaren gel however is topical option.  She recheck on a yearly basis repeat x-rays both knees.    Deep Langston M.D.

## 2023-10-18 ENCOUNTER — INFUSION (OUTPATIENT)
Dept: INFUSION THERAPY | Facility: HOSPITAL | Age: 68
End: 2023-10-18
Attending: NURSE PRACTITIONER
Payer: MEDICARE

## 2023-10-18 VITALS
TEMPERATURE: 98 F | RESPIRATION RATE: 17 BRPM | OXYGEN SATURATION: 100 % | SYSTOLIC BLOOD PRESSURE: 151 MMHG | DIASTOLIC BLOOD PRESSURE: 89 MMHG | HEART RATE: 78 BPM

## 2023-10-18 DIAGNOSIS — K51.919 ULCERATIVE COLITIS WITH COMPLICATION, UNSPECIFIED LOCATION: ICD-10-CM

## 2023-10-18 DIAGNOSIS — Z96.652 H/O TOTAL KNEE REPLACEMENT, LEFT: Primary | ICD-10-CM

## 2023-10-18 PROCEDURE — 96365 THER/PROPH/DIAG IV INF INIT: CPT

## 2023-10-18 PROCEDURE — 25000003 PHARM REV CODE 250: Performed by: NURSE PRACTITIONER

## 2023-10-18 PROCEDURE — 63600175 PHARM REV CODE 636 W HCPCS: Mod: JZ,JG | Performed by: NURSE PRACTITIONER

## 2023-10-18 RX ORDER — ACETAMINOPHEN 325 MG/1
650 TABLET ORAL ONCE
Status: DISCONTINUED | OUTPATIENT
Start: 2023-10-18 | End: 2023-10-18 | Stop reason: HOSPADM

## 2023-10-18 RX ORDER — CETIRIZINE HYDROCHLORIDE 10 MG/1
10 TABLET ORAL ONCE
Status: CANCELLED | OUTPATIENT
Start: 2023-12-13 | End: 2023-12-13

## 2023-10-18 RX ORDER — SODIUM CHLORIDE 0.9 % (FLUSH) 0.9 %
10 SYRINGE (ML) INJECTION
Status: CANCELLED | OUTPATIENT
Start: 2023-12-13

## 2023-10-18 RX ORDER — EPINEPHRINE 1 MG/ML
0.3 INJECTION, SOLUTION, CONCENTRATE INTRAVENOUS
Status: CANCELLED | OUTPATIENT
Start: 2023-12-13

## 2023-10-18 RX ORDER — ACETAMINOPHEN 325 MG/1
650 TABLET ORAL
Status: CANCELLED | OUTPATIENT
Start: 2023-12-13

## 2023-10-18 RX ORDER — ACETAMINOPHEN 325 MG/1
650 TABLET ORAL ONCE
Status: CANCELLED | OUTPATIENT
Start: 2023-12-13 | End: 2023-12-13

## 2023-10-18 RX ORDER — METHYLPREDNISOLONE SOD SUCC 125 MG
40 VIAL (EA) INJECTION
Status: CANCELLED | OUTPATIENT
Start: 2023-12-13

## 2023-10-18 RX ORDER — DIPHENHYDRAMINE HYDROCHLORIDE 50 MG/ML
25 INJECTION INTRAMUSCULAR; INTRAVENOUS
Status: CANCELLED | OUTPATIENT
Start: 2023-12-13

## 2023-10-18 RX ORDER — IPRATROPIUM BROMIDE AND ALBUTEROL SULFATE 2.5; .5 MG/3ML; MG/3ML
3 SOLUTION RESPIRATORY (INHALATION)
Status: CANCELLED | OUTPATIENT
Start: 2023-12-13

## 2023-10-18 RX ORDER — CETIRIZINE HYDROCHLORIDE 10 MG/1
10 TABLET ORAL ONCE
Status: DISCONTINUED | OUTPATIENT
Start: 2023-10-18 | End: 2023-10-18 | Stop reason: HOSPADM

## 2023-10-18 RX ORDER — SODIUM CHLORIDE 0.9 % (FLUSH) 0.9 %
10 SYRINGE (ML) INJECTION
Status: DISCONTINUED | OUTPATIENT
Start: 2023-10-18 | End: 2023-10-18 | Stop reason: HOSPADM

## 2023-10-18 RX ADMIN — VEDOLIZUMAB 300 MG: 300 INJECTION, POWDER, LYOPHILIZED, FOR SOLUTION INTRAVENOUS at 08:10

## 2023-10-18 NOTE — PROGRESS NOTES
0828 Patient arrived for entyvio infusion today ambulatory to [facility]. Therapy plan released and pharmacy notified.  0850 infusion started  0920 infusion finished  0921 Int flushed   0935 Patient discharged , given AVS and told to watch for signs and symptoms of adverse reactions , if had any to go to the ER. Patient given upcoming appointment .

## 2023-11-07 ENCOUNTER — OFFICE VISIT (OUTPATIENT)
Dept: GASTROENTEROLOGY | Facility: CLINIC | Age: 68
End: 2023-11-07
Payer: MEDICARE

## 2023-11-07 VITALS
HEART RATE: 80 BPM | DIASTOLIC BLOOD PRESSURE: 91 MMHG | HEIGHT: 68 IN | SYSTOLIC BLOOD PRESSURE: 144 MMHG | BODY MASS INDEX: 26.98 KG/M2 | WEIGHT: 178 LBS

## 2023-11-07 DIAGNOSIS — K51.919 ULCERATIVE COLITIS WITH COMPLICATION, UNSPECIFIED LOCATION: Primary | ICD-10-CM

## 2023-11-07 PROCEDURE — 99214 OFFICE O/P EST MOD 30 MIN: CPT | Mod: S$PBB,,, | Performed by: NURSE PRACTITIONER

## 2023-11-07 PROCEDURE — 99213 OFFICE O/P EST LOW 20 MIN: CPT | Mod: PBBFAC | Performed by: NURSE PRACTITIONER

## 2023-11-07 PROCEDURE — 99214 PR OFFICE/OUTPT VISIT, EST, LEVL IV, 30-39 MIN: ICD-10-PCS | Mod: S$PBB,,, | Performed by: NURSE PRACTITIONER

## 2023-11-07 RX ORDER — BUDESONIDE 3 MG/1
9 CAPSULE, COATED PELLETS ORAL DAILY
Qty: 90 CAPSULE | Refills: 0 | Status: SHIPPED | OUTPATIENT
Start: 2023-11-07 | End: 2023-11-27

## 2023-11-07 NOTE — PROGRESS NOTES
Isabella Kelly is a 67 y.o. female here for Follow-up        PCP: Jack Swift  Referring Provider: No referring provider defined for this encounter.     HPI:  Presents for follow-up due to ulcerative colitis.  Patient is receiving Entyvio infusions.  Last Entyvio infusion was 10/18/2023.  Overall patient has had much improvement in her UC symptoms.  Over the last 2-3 weeks she has been noticing bright red blood in the stool.  Stools are formed.  No diarrhea.  Does endorse abdominal cramping with bowel movements.  We will add a course of budesonide.  She does have a history of iron-deficiency anemia.  Has required iron infusions.  Colonoscopy on 09/19/2022, moderate colitis.    Follow-up  Associated symptoms include abdominal pain. Pertinent negatives include no change in bowel habit, chest pain, fatigue, fever, nausea or vomiting.         ROS:  Review of Systems   Constitutional:  Negative for appetite change, fatigue, fever and unexpected weight change.   HENT:  Negative for trouble swallowing.    Respiratory:  Negative for shortness of breath.    Cardiovascular:  Negative for chest pain.   Gastrointestinal:  Positive for abdominal pain and blood in stool. Negative for change in bowel habit, constipation, diarrhea, nausea, vomiting and reflux.   Musculoskeletal:  Negative for gait problem.   Integumentary:  Negative for pallor.   Neurological:  Negative for light-headedness.   Psychiatric/Behavioral:  The patient is not nervous/anxious.           PMHX:  has a past medical history of Acquired hypothyroidism (1/13/2023), Bacterial vaginosis, Cystocele with rectocele (11/28/2007), Cystocele, midline (12/12/2007), Depressive disorder (09/2004), Dysmenorrhea (2006), Esophageal reflux, Granulation tissue (08/12/2008), Hypercholesterolemia (09/20/2004), Hyperlipidemia (10/26/2011), Hypertension, Hypertriglyceridemia (10/04/2010), Hypertriglyceridemia (10/04/2010), IBD (inflammatory bowel disease), Iron  "deficiency anemia due to chronic blood loss (12/19/2022), Large uterus, Menorrhagia, Non-healing wound of left lower extremity (12/12/2022), Polymenorrhea, PONV (postoperative nausea and vomiting), Post-hysterectomy menopause, Rectocele, Stress incontinence, Ulcerative colitis (08/2020), Uterine prolapse, and Vaginal discharge.    PSHX:  has a past surgical history that includes anterior/posterior colporrhaphy with vaginal enterocele repair (06/18/2008); Appendectomy; Colonoscopy; Dilation and curettage of uterus; Hysteroscopy (11/28/2007); Endometrial biopsy (02/16/2006); ENDOMETRIAL BIOPSY REPEAT  (11/06/2007); SILVER NITRATE CAUTERY OF VAGINAL WALL (08/12/2008); SIS (11/15/2007); Total abdominal hysterectomy; and TOT SLING (06/18/2008).    PFHX: Family history is unknown by patient.    PSlHX:  reports that she has never smoked. She has never used smokeless tobacco. She reports that she does not drink alcohol and does not use drugs.        Review of patient's allergies indicates:  No Known Allergies    Medication List with Changes/Refills   New Medications    BUDESONIDE (ENTOCORT EC) 3 MG CAPSULE    Take 3 capsules (9 mg total) by mouth once daily.   Current Medications    ESTRADIOL (ESTRACE) 1 MG TABLET    Take 1 tablet (1 mg total) by mouth once daily.    LEVOTHYROXINE (SYNTHROID) 100 MCG TABLET    Take 100 mcg by mouth before breakfast.    LISINOPRIL (PRINIVIL,ZESTRIL) 5 MG TABLET    Take 5 mg by mouth once daily.    OMEPRAZOLE (PRILOSEC) 40 MG CAPSULE    Take 40 mg by mouth 2 (two) times daily.    ROSUVASTATIN (CRESTOR) 10 MG TABLET    TAKE 1 TABLET BY MOUTH EVERY DAY WITH SELENIUM 200 MCG        Objective Findings:  Vital Signs:  BP (!) 144/91   Pulse 80   Ht 5' 8" (1.727 m)   Wt 80.7 kg (178 lb)   BMI 27.06 kg/m²  Body mass index is 27.06 kg/m².    Physical Exam:  Physical Exam  Vitals and nursing note reviewed.   Constitutional:       General: She is not in acute distress.     Appearance: Normal " appearance.   HENT:      Mouth/Throat:      Mouth: Mucous membranes are moist.   Cardiovascular:      Rate and Rhythm: Normal rate.   Pulmonary:      Effort: Pulmonary effort is normal.      Breath sounds: No wheezing, rhonchi or rales.   Abdominal:      General: Bowel sounds are normal. There is no distension.      Palpations: Abdomen is soft. There is no mass.      Tenderness: There is no abdominal tenderness. There is no guarding.      Hernia: No hernia is present.   Skin:     General: Skin is warm and dry.      Coloration: Skin is not jaundiced or pale.   Neurological:      Mental Status: She is alert and oriented to person, place, and time.   Psychiatric:         Mood and Affect: Mood normal.          Labs:  Lab Results   Component Value Date    WBC 7.65 11/07/2023    HGB 11.9 (L) 11/07/2023    HCT 35.3 (L) 11/07/2023    MCV 93.1 11/07/2023    RDW 12.7 11/07/2023     11/07/2023    LYMPH 29.0 11/07/2023    LYMPH 2.22 11/07/2023    MONO 9.7 (H) 11/07/2023    EOS 0.30 11/07/2023    BASO 0.02 11/07/2023     Lab Results   Component Value Date     05/15/2023    K 4.2 05/15/2023     05/15/2023    CO2 31 05/15/2023    GLU 89 05/15/2023    BUN 18 05/15/2023    CREATININE 0.70 05/15/2023    CALCIUM 9.1 05/15/2023    PROT 7.2 05/15/2023    ALBUMIN 3.6 05/15/2023    BILITOT 0.3 05/15/2023    ALKPHOS 81 05/15/2023    AST 12 (L) 05/15/2023    ALT 15 05/15/2023         Imaging: No results found.      Assessment:  Isabella Kelly is a 67 y.o. female here with:  1. Ulcerative colitis with complication, unspecified location          Recommendations:  1. Budesonide 9 mg daily for 2 weeks then reduced to 6 mg for 1 week then reduce to 3 mg and discontinue  2. Avoid NSAID's  3. Continue Entyvio infusions  4. CBC, CRP, iron studies  5. Calprotectin stool in 3 months  6. We will add hepatitis B core antibody and TB gold at next visit. Consider bone density study    Follow up in about 3 months (around  2/7/2024).      Order summary:  Orders Placed This Encounter    Iron and TIBC    Ferritin    CBC Auto Differential    C-Reactive Protein    Calprotectin, Stool    budesonide (ENTOCORT EC) 3 mg capsule       Thank you for allowing me to participate in the care of Isabella Kelly.      Luisana Suazo, SAUL-C

## 2023-11-08 ENCOUNTER — TELEPHONE (OUTPATIENT)
Dept: GASTROENTEROLOGY | Facility: CLINIC | Age: 68
End: 2023-11-08
Payer: MEDICARE

## 2023-11-08 DIAGNOSIS — D53.9 NUTRITIONAL ANEMIA, UNSPECIFIED: ICD-10-CM

## 2023-11-08 DIAGNOSIS — K51.919 ULCERATIVE COLITIS WITH COMPLICATION, UNSPECIFIED LOCATION: Primary | ICD-10-CM

## 2023-11-08 NOTE — TELEPHONE ENCOUNTER
[facility] and able to add all labs needed except TB gold. Ordered per Luisana Suazo NP request and had Rosanna in clinic lab add these on.

## 2023-11-21 ENCOUNTER — PATIENT MESSAGE (OUTPATIENT)
Dept: GASTROENTEROLOGY | Facility: CLINIC | Age: 68
End: 2023-11-21
Payer: MEDICARE

## 2023-11-21 DIAGNOSIS — K51.919 ULCERATIVE COLITIS WITH COMPLICATION, UNSPECIFIED LOCATION: Primary | ICD-10-CM

## 2023-11-21 RX ORDER — METRONIDAZOLE 500 MG/1
500 TABLET ORAL EVERY 12 HOURS
Qty: 28 TABLET | Refills: 0 | Status: SHIPPED | OUTPATIENT
Start: 2023-11-21 | End: 2023-12-05

## 2023-11-22 DIAGNOSIS — K51.919 ULCERATIVE COLITIS WITH COMPLICATION, UNSPECIFIED LOCATION: Primary | ICD-10-CM

## 2023-11-27 DIAGNOSIS — K51.919 ULCERATIVE COLITIS WITH COMPLICATION, UNSPECIFIED LOCATION: ICD-10-CM

## 2023-11-27 DIAGNOSIS — K51.919 ULCERATIVE COLITIS WITH COMPLICATION, UNSPECIFIED LOCATION: Primary | ICD-10-CM

## 2023-11-27 RX ORDER — BUDESONIDE 3 MG/1
9 CAPSULE, COATED PELLETS ORAL DAILY
Qty: 90 CAPSULE | Refills: 0 | Status: SHIPPED | OUTPATIENT
Start: 2023-11-27 | End: 2023-12-27

## 2023-12-06 ENCOUNTER — PATIENT MESSAGE (OUTPATIENT)
Dept: GASTROENTEROLOGY | Facility: CLINIC | Age: 68
End: 2023-12-06
Payer: MEDICARE

## 2023-12-06 DIAGNOSIS — K51.919 ULCERATIVE COLITIS WITH COMPLICATION, UNSPECIFIED LOCATION: Primary | ICD-10-CM

## 2023-12-06 RX ORDER — PREDNISONE 20 MG/1
20 TABLET ORAL DAILY
Qty: 7 TABLET | Refills: 0 | Status: SHIPPED | OUTPATIENT
Start: 2023-12-06 | End: 2023-12-13

## 2023-12-11 ENCOUNTER — INFUSION (OUTPATIENT)
Dept: INFUSION THERAPY | Facility: HOSPITAL | Age: 68
End: 2023-12-11
Attending: OBSTETRICS & GYNECOLOGY
Payer: MEDICARE

## 2023-12-11 ENCOUNTER — PATIENT MESSAGE (OUTPATIENT)
Dept: GASTROENTEROLOGY | Facility: CLINIC | Age: 68
End: 2023-12-11
Payer: MEDICARE

## 2023-12-11 VITALS
DIASTOLIC BLOOD PRESSURE: 89 MMHG | HEART RATE: 75 BPM | SYSTOLIC BLOOD PRESSURE: 145 MMHG | TEMPERATURE: 99 F | RESPIRATION RATE: 17 BRPM | OXYGEN SATURATION: 96 %

## 2023-12-11 DIAGNOSIS — Z96.652 H/O TOTAL KNEE REPLACEMENT, LEFT: Primary | ICD-10-CM

## 2023-12-11 DIAGNOSIS — K51.919 ULCERATIVE COLITIS WITH COMPLICATION, UNSPECIFIED LOCATION: ICD-10-CM

## 2023-12-11 PROCEDURE — 63600175 PHARM REV CODE 636 W HCPCS: Mod: JZ,JG | Performed by: NURSE PRACTITIONER

## 2023-12-11 PROCEDURE — 25000003 PHARM REV CODE 250: Performed by: NURSE PRACTITIONER

## 2023-12-11 PROCEDURE — 96365 THER/PROPH/DIAG IV INF INIT: CPT

## 2023-12-11 RX ORDER — CETIRIZINE HYDROCHLORIDE 10 MG/1
10 TABLET ORAL ONCE
Status: DISCONTINUED | OUTPATIENT
Start: 2023-12-11 | End: 2023-12-11 | Stop reason: HOSPADM

## 2023-12-11 RX ORDER — ACETAMINOPHEN 325 MG/1
650 TABLET ORAL
Status: ACTIVE | OUTPATIENT
Start: 2023-12-11 | End: 2023-12-11

## 2023-12-11 RX ORDER — EPINEPHRINE 0.3 MG/.3ML
0.3 INJECTION SUBCUTANEOUS
Status: ACTIVE | OUTPATIENT
Start: 2023-12-11 | End: 2023-12-11

## 2023-12-11 RX ORDER — METHYLPREDNISOLONE SOD SUCC 125 MG
40 VIAL (EA) INJECTION
Status: ACTIVE | OUTPATIENT
Start: 2023-12-11 | End: 2023-12-11

## 2023-12-11 RX ORDER — SODIUM CHLORIDE 0.9 % (FLUSH) 0.9 %
10 SYRINGE (ML) INJECTION
Status: DISCONTINUED | OUTPATIENT
Start: 2023-12-11 | End: 2023-12-11 | Stop reason: HOSPADM

## 2023-12-11 RX ORDER — ACETAMINOPHEN 325 MG/1
650 TABLET ORAL ONCE
Status: DISCONTINUED | OUTPATIENT
Start: 2023-12-11 | End: 2023-12-11 | Stop reason: HOSPADM

## 2023-12-11 RX ORDER — IPRATROPIUM BROMIDE AND ALBUTEROL SULFATE 2.5; .5 MG/3ML; MG/3ML
3 SOLUTION RESPIRATORY (INHALATION)
Status: ACTIVE | OUTPATIENT
Start: 2023-12-11 | End: 2023-12-11

## 2023-12-11 RX ORDER — DIPHENHYDRAMINE HYDROCHLORIDE 50 MG/ML
25 INJECTION INTRAMUSCULAR; INTRAVENOUS
Status: DISCONTINUED | OUTPATIENT
Start: 2023-12-11 | End: 2023-12-11

## 2023-12-11 RX ADMIN — VEDOLIZUMAB 300 MG: 300 INJECTION, POWDER, LYOPHILIZED, FOR SOLUTION INTRAVENOUS at 09:12

## 2023-12-11 NOTE — PROGRESS NOTES
0840 Pt here for Entyvio  infusion, resting in recliner, TP released and pharmacy notified    0923 Entyvio infusion started to infuse over 30 min  0955 Entyvio infusion complete, tolerated well, will continue to monitor  1010 pt discharged ambulatory with no adverse reaction noted, pt notified to go to ER with any adverse reactions, AVS given along with medication information  Follow up appt made for 8 weeks

## 2024-01-03 ENCOUNTER — PATIENT MESSAGE (OUTPATIENT)
Dept: GASTROENTEROLOGY | Facility: CLINIC | Age: 69
End: 2024-01-03
Payer: MEDICARE

## 2024-01-16 ENCOUNTER — OFFICE VISIT (OUTPATIENT)
Dept: GASTROENTEROLOGY | Facility: CLINIC | Age: 69
End: 2024-01-16
Payer: MEDICARE

## 2024-01-16 VITALS
BODY MASS INDEX: 26.88 KG/M2 | WEIGHT: 177.38 LBS | DIASTOLIC BLOOD PRESSURE: 95 MMHG | HEIGHT: 68 IN | HEART RATE: 92 BPM | SYSTOLIC BLOOD PRESSURE: 161 MMHG

## 2024-01-16 DIAGNOSIS — K51.919 ULCERATIVE COLITIS WITH COMPLICATION, UNSPECIFIED LOCATION: Primary | ICD-10-CM

## 2024-01-16 DIAGNOSIS — D50.9 IRON DEFICIENCY ANEMIA, UNSPECIFIED IRON DEFICIENCY ANEMIA TYPE: ICD-10-CM

## 2024-01-16 PROCEDURE — 99214 OFFICE O/P EST MOD 30 MIN: CPT | Mod: S$PBB,,, | Performed by: NURSE PRACTITIONER

## 2024-01-16 PROCEDURE — 99213 OFFICE O/P EST LOW 20 MIN: CPT | Mod: PBBFAC | Performed by: NURSE PRACTITIONER

## 2024-01-16 NOTE — PROGRESS NOTES
Isabella Kelly is a 68 y.o. female here for No chief complaint on file.        PCP: Jack Swift  Referring Provider: No referring provider defined for this encounter.     HPI:  Presents in follow-up due to ulcerative colitis.  Patient has had rectal bleeding that started in November.  She did take a 1 week course of prednisone as well as budesonide and Flagyl.  No improvement in symptoms.  She is currently receiving Entyvio infusions.  Last Entyvio infusion was 12/11/2023.  Patient reports rectal bleeding that is accompanied by urgency and abdominal cramping.  Reports that at times she only passes blood and mucus without stool.  Calprotectin one-month ago was 703.  Last colonoscopy was 09/19/2022, per Dr. Lynn.  Report reviewed, moderate sigmoid colitis with focally active minimal proctitis.  Patient has previously failed mesalamine, treatments budesonide, zeposia, and has now failed Entyvio.  Discussed patient with Dr. Estevez.  Recommend repeat colonoscopy for evaluation then a change in biologic therapy.  We did discuss Rinvoq as well as Remicade.  She does have a history of iron-deficiency anemia.  Is unable to tolerate oral iron and requires IV infusions. Iron studies normal in November. CRP mildly elevated at 1.12.  No B12 deficiency.  We will need bone density scan.  Appetite is reported as good.          ROS:  Review of Systems   Constitutional:  Negative for appetite change, fatigue, fever and unexpected weight change.   HENT:  Negative for trouble swallowing.    Cardiovascular:  Negative for chest pain.   Gastrointestinal:  Positive for abdominal pain (cramping), blood in stool and diarrhea. Negative for change in bowel habit, constipation, nausea, vomiting and reflux.   Musculoskeletal:  Negative for gait problem.   Integumentary:  Negative for pallor.   Psychiatric/Behavioral:  The patient is not nervous/anxious.           PMHX:  has a past medical history of Acquired hypothyroidism  (1/13/2023), Bacterial vaginosis, Cystocele with rectocele (11/28/2007), Cystocele, midline (12/12/2007), Depressive disorder (09/2004), Dysmenorrhea (2006), Esophageal reflux, Granulation tissue (08/12/2008), Hypercholesterolemia (09/20/2004), Hyperlipidemia (10/26/2011), Hypertension, Hypertriglyceridemia (10/04/2010), Hypertriglyceridemia (10/04/2010), IBD (inflammatory bowel disease), Iron deficiency anemia due to chronic blood loss (12/19/2022), Large uterus, Menorrhagia, Non-healing wound of left lower extremity (12/12/2022), Polymenorrhea, PONV (postoperative nausea and vomiting), Post-hysterectomy menopause, Rectocele, Stress incontinence, Ulcerative colitis (08/2020), Uterine prolapse, and Vaginal discharge.    PSHX:  has a past surgical history that includes anterior/posterior colporrhaphy with vaginal enterocele repair (06/18/2008); Appendectomy; Colonoscopy; Dilation and curettage of uterus; Hysteroscopy (11/28/2007); Endometrial biopsy (02/16/2006); ENDOMETRIAL BIOPSY REPEAT  (11/06/2007); SILVER NITRATE CAUTERY OF VAGINAL WALL (08/12/2008); SIS (11/15/2007); Total abdominal hysterectomy; and TOT SLING (06/18/2008).    PFHX: Family history is unknown by patient.    PSlHX:  reports that she has never smoked. She has never used smokeless tobacco. She reports that she does not drink alcohol and does not use drugs.        Review of patient's allergies indicates:  No Known Allergies    Medication List with Changes/Refills   Current Medications    ESTRADIOL (ESTRACE) 1 MG TABLET    Take 1 tablet (1 mg total) by mouth once daily.    LEVOTHYROXINE (SYNTHROID) 100 MCG TABLET    Take 100 mcg by mouth before breakfast.    LISINOPRIL (PRINIVIL,ZESTRIL) 5 MG TABLET    Take 5 mg by mouth once daily.    OMEPRAZOLE (PRILOSEC) 40 MG CAPSULE    Take 40 mg by mouth 2 (two) times daily.    ROSUVASTATIN (CRESTOR) 10 MG TABLET    TAKE 1 TABLET BY MOUTH EVERY DAY WITH SELENIUM 200 MCG        Objective Findings:  Vital  "Signs:  BP (!) 161/95   Pulse 92   Ht 5' 8" (1.727 m)   Wt 80.5 kg (177 lb 6.4 oz)   BMI 26.97 kg/m²  Body mass index is 26.97 kg/m².    Physical Exam:  Physical Exam  Vitals and nursing note reviewed.   Constitutional:       General: She is not in acute distress.     Appearance: Normal appearance.   HENT:      Mouth/Throat:      Mouth: Mucous membranes are moist.   Cardiovascular:      Rate and Rhythm: Normal rate.   Pulmonary:      Effort: Pulmonary effort is normal.      Breath sounds: No wheezing, rhonchi or rales.   Abdominal:      General: Bowel sounds are normal. There is no distension.      Palpations: Abdomen is soft. There is no mass.      Tenderness: There is no abdominal tenderness. There is no guarding.   Skin:     General: Skin is warm and dry.      Coloration: Skin is not jaundiced or pale.   Neurological:      Mental Status: She is alert and oriented to person, place, and time.   Psychiatric:         Mood and Affect: Mood normal.          Labs:  Lab Results   Component Value Date    WBC 11.49 (H) 01/16/2024    HGB 11.4 (L) 01/16/2024    HCT 34.8 (L) 01/16/2024    MCV 93.0 01/16/2024    RDW 11.9 01/16/2024     (H) 01/16/2024    LYMPH 16.3 (L) 01/16/2024    LYMPH 1.87 01/16/2024    MONO 8.8 (H) 01/16/2024    EOS 0.49 01/16/2024    BASO 0.04 01/16/2024     Lab Results   Component Value Date     05/15/2023    K 4.2 05/15/2023     05/15/2023    CO2 31 05/15/2023    GLU 89 05/15/2023    BUN 18 05/15/2023    CREATININE 0.70 05/15/2023    CALCIUM 9.1 05/15/2023    PROT 7.2 05/15/2023    ALBUMIN 3.6 05/15/2023    BILITOT 0.3 05/15/2023    ALKPHOS 81 05/15/2023    AST 12 (L) 05/15/2023    ALT 15 05/15/2023         Imaging: No results found.      Assessment:  Isabellachito Kelly is a 68 y.o. female here with:  1. Ulcerative colitis with complication, unspecified location    2. Iron deficiency anemia, unspecified iron deficiency anemia type          Recommendations:  1. Labs below " today  2. Add on colonoscopy tomorrow, Patient had breakfast. Dr. Estevez is aware.  3. Avoid NSAID's  4. Plan for bone density scan  5. Follow up after colonoscopy    No follow-ups on file.      Order summary:  Orders Placed This Encounter    CBC Auto Differential    Iron and TIBC    Ferritin    C-Reactive Protein    Comprehensive Metabolic Panel    Quantiferon Gold TB    Hepatitis B Surface Antigen    Hepatitis B Core Antibody, IgM    Colonoscopy       Thank you for allowing me to participate in the care of Isabella Kelly.      LORA NevilleC

## 2024-01-17 ENCOUNTER — ANESTHESIA EVENT (OUTPATIENT)
Dept: GASTROENTEROLOGY | Facility: HOSPITAL | Age: 69
End: 2024-01-17
Payer: MEDICARE

## 2024-01-17 ENCOUNTER — ANESTHESIA (OUTPATIENT)
Dept: GASTROENTEROLOGY | Facility: HOSPITAL | Age: 69
End: 2024-01-17
Payer: MEDICARE

## 2024-01-17 ENCOUNTER — HOSPITAL ENCOUNTER (OUTPATIENT)
Dept: GASTROENTEROLOGY | Facility: HOSPITAL | Age: 69
Discharge: HOME | End: 2024-01-17
Attending: NURSE PRACTITIONER
Payer: MEDICARE

## 2024-01-17 VITALS
RESPIRATION RATE: 10 BRPM | OXYGEN SATURATION: 99 % | SYSTOLIC BLOOD PRESSURE: 127 MMHG | HEART RATE: 87 BPM | TEMPERATURE: 99 F | DIASTOLIC BLOOD PRESSURE: 74 MMHG

## 2024-01-17 DIAGNOSIS — K57.30 DIVERTICULOSIS OF COLON: ICD-10-CM

## 2024-01-17 DIAGNOSIS — D50.9 IRON DEFICIENCY ANEMIA, UNSPECIFIED IRON DEFICIENCY ANEMIA TYPE: ICD-10-CM

## 2024-01-17 DIAGNOSIS — D12.2 ADENOMATOUS POLYP OF ASCENDING COLON: ICD-10-CM

## 2024-01-17 DIAGNOSIS — K63.5 POLYP OF TRANSVERSE COLON, UNSPECIFIED TYPE: Primary | ICD-10-CM

## 2024-01-17 DIAGNOSIS — K51.919 ULCERATIVE COLITIS WITH COMPLICATION, UNSPECIFIED LOCATION: ICD-10-CM

## 2024-01-17 LAB
HCV AB SER QL: NORMAL
HIV 1+O+2 AB SERPL QL: NORMAL

## 2024-01-17 PROCEDURE — 27201423 OPTIME MED/SURG SUP & DEVICES STERILE SUPPLY

## 2024-01-17 PROCEDURE — 45385 COLONOSCOPY W/LESION REMOVAL: CPT | Mod: ,,, | Performed by: INTERNAL MEDICINE

## 2024-01-17 PROCEDURE — 86803 HEPATITIS C AB TEST: CPT | Performed by: INTERNAL MEDICINE

## 2024-01-17 PROCEDURE — 88305 TISSUE EXAM BY PATHOLOGIST: CPT | Mod: TC,SUR | Performed by: INTERNAL MEDICINE

## 2024-01-17 PROCEDURE — 25000003 PHARM REV CODE 250: Performed by: NURSE ANESTHETIST, CERTIFIED REGISTERED

## 2024-01-17 PROCEDURE — 45385 COLONOSCOPY W/LESION REMOVAL: CPT | Performed by: INTERNAL MEDICINE

## 2024-01-17 PROCEDURE — 63600175 PHARM REV CODE 636 W HCPCS: Performed by: NURSE ANESTHETIST, CERTIFIED REGISTERED

## 2024-01-17 PROCEDURE — D9220A PRA ANESTHESIA: Mod: ,,, | Performed by: NURSE ANESTHETIST, CERTIFIED REGISTERED

## 2024-01-17 PROCEDURE — 87389 HIV-1 AG W/HIV-1&-2 AB AG IA: CPT | Performed by: INTERNAL MEDICINE

## 2024-01-17 PROCEDURE — 37000008 HC ANESTHESIA 1ST 15 MINUTES

## 2024-01-17 PROCEDURE — 88342 IMHCHEM/IMCYTCHM 1ST ANTB: CPT | Mod: 26,,, | Performed by: PATHOLOGY

## 2024-01-17 PROCEDURE — 88305 TISSUE EXAM BY PATHOLOGIST: CPT | Mod: 26,,, | Performed by: PATHOLOGY

## 2024-01-17 PROCEDURE — 45380 COLONOSCOPY AND BIOPSY: CPT | Mod: 59,,, | Performed by: INTERNAL MEDICINE

## 2024-01-17 PROCEDURE — 45380 COLONOSCOPY AND BIOPSY: CPT | Mod: 59 | Performed by: INTERNAL MEDICINE

## 2024-01-17 PROCEDURE — 37000009 HC ANESTHESIA EA ADD 15 MINS

## 2024-01-17 RX ORDER — LIDOCAINE HYDROCHLORIDE 20 MG/ML
INJECTION, SOLUTION EPIDURAL; INFILTRATION; INTRACAUDAL; PERINEURAL
Status: DISCONTINUED | OUTPATIENT
Start: 2024-01-17 | End: 2024-01-17

## 2024-01-17 RX ORDER — SODIUM CHLORIDE 0.9 % (FLUSH) 0.9 %
10 SYRINGE (ML) INJECTION
Status: DISCONTINUED | OUTPATIENT
Start: 2024-01-17 | End: 2024-01-18 | Stop reason: HOSPADM

## 2024-01-17 RX ORDER — PROPOFOL 10 MG/ML
INJECTION, EMULSION INTRAVENOUS
Status: DISCONTINUED | OUTPATIENT
Start: 2024-01-17 | End: 2024-01-17

## 2024-01-17 RX ORDER — PREDNISONE 20 MG/1
20 TABLET ORAL DAILY
Qty: 30 TABLET | Refills: 0 | Status: SHIPPED | OUTPATIENT
Start: 2024-01-17 | End: 2024-02-16

## 2024-01-17 RX ADMIN — PROPOFOL 50 MG: 10 INJECTION, EMULSION INTRAVENOUS at 01:01

## 2024-01-17 RX ADMIN — SODIUM CHLORIDE: 9 INJECTION, SOLUTION INTRAVENOUS at 01:01

## 2024-01-17 RX ADMIN — LIDOCAINE HYDROCHLORIDE 50 MG: 20 INJECTION, SOLUTION INTRAVENOUS at 01:01

## 2024-01-17 NOTE — ANESTHESIA RELEASE NOTE
Anesthesia Release from PACU Note    Patient: Isabella Kelly    Procedure(s) Performed: * No procedures listed *    Anesthesia type: general    Post pain: Adequate analgesia    Post assessment: no apparent anesthetic complications    Last Vitals: Visit Vitals  BP (!) 105/50 (BP Location: Right arm, Patient Position: Lying)   Pulse 97   Temp 37 °C (98.6 °F) (Oral)   Resp 18   SpO2 100%   Breastfeeding No       Post vital signs: stable    Level of consciousness: awake    Nausea/Vomiting: no nausea/no vomiting    Complications: none    Airway Patency: patent    Respiratory: unassisted    Cardiovascular: stable and blood pressure at baseline    Hydration: euvolemic

## 2024-01-17 NOTE — H&P
[facility]  Gastroenterology  H&P    Patient Name: Isabella Kelly  MRN: [id number]  Admission Date: 1/17/2024  Code Status: Full Code    Attending Provider: Luisana Suazo FNP   Primary Care Physician: Jack Swift MD  Principal Problem:<principal problem not specified>    Subjective:     History of Present Illness: Pt has UC and blood per rectum, on Entyvio. Her last colonoscopy was 9/22.    Past Medical History:   Diagnosis Date    Acquired hypothyroidism 1/13/2023    Bacterial vaginosis     Cystocele with rectocele 11/28/2007    1st degree cystocele; 2nd degree rectocele    Cystocele, midline 12/12/2007    midline    Depressive disorder 09/2004    mild    Dysmenorrhea 2006    severe first day and getting worse    Esophageal reflux     Granulation tissue 08/12/2008    5 - 8 mm long lower post repair granulation tissue - treated with sliver nitrate cautery    Hypercholesterolemia 09/20/2004    cholesterol is 219 mg/dl    Hyperlipidemia 10/26/2011    mile;  TG  71 mg/dl;  HDL  67 mg/dl;  LDL  136 mg/dl;  total cholesterol  217 mg/dl.  10/13/2017:  LDL  163 mg/dl;  total cholesterol  260 mg/dl; triglycerides  186 mg/dl;  HDL  66 mg/dl    Hypertension     Hypertriglyceridemia 10/04/2010    216 mg/dl;  [id number]    Hypertriglyceridemia 10/04/2010    216  mg/dll  02/01/2013 - recent kevek us 238 mg/dl    IBD (inflammatory bowel disease)     Iron deficiency anemia due to chronic blood loss 12/19/2022    Large uterus     Menorrhagia     Non-healing wound of left lower extremity 12/12/2022    Polymenorrhea     PONV (postoperative nausea and vomiting)     Post-hysterectomy menopause     Rectocele     Stress incontinence     Ulcerative colitis 08/2020    Ulcerative colitis confirmed by colonoscopy by Dr. Ho Capellan    Uterine prolapse     Vaginal discharge        Past Surgical History:   Procedure Laterality Date    anterior/posterior colporrhaphy with vaginal enterocele repair  06/18/2008     APPENDECTOMY      COLONOSCOPY      DILATION AND CURETTAGE OF UTERUS      [id number]    ENDOMETRIAL BIOPSY  02/16/2006    ENDOMETRIAL BIOPSY REPEAT   11/06/2007    HYSTEROSCOPY  11/28/2007    SILVER NITRATE CAUTERY OF VAGINAL WALL  08/12/2008    SIS  11/15/2007    TOT SLING  06/18/2008    TOTAL ABDOMINAL HYSTERECTOMY         Review of patient's allergies indicates:  No Known Allergies  Family History    Family history is unknown by patient.       Tobacco Use    Smoking status: Never    Smokeless tobacco: Never   Substance and Sexual Activity    Alcohol use: Never    Drug use: Never    Sexual activity: Yes     Partners: Male     Birth control/protection: See Surgical Hx     Review of Systems   Respiratory: Negative.     Cardiovascular: Negative.    Gastrointestinal:  Positive for blood in stool.     Objective:     Vital Signs (Most Recent):    Vital Signs (24h Range):           There is no height or weight on file to calculate BMI.    No intake or output data in the 24 hours ending 01/17/24 1247    Lines/Drains/Airways       None                   Physical Exam  Vitals reviewed.   Constitutional:       General: She is not in acute distress.     Appearance: Normal appearance. She is well-developed. She is not ill-appearing.   HENT:      Head: Normocephalic and atraumatic.      Nose: Nose normal.   Eyes:      Pupils: Pupils are equal, round, and reactive to light.   Cardiovascular:      Rate and Rhythm: Normal rate and regular rhythm.   Pulmonary:      Effort: Pulmonary effort is normal.      Breath sounds: Normal breath sounds. No wheezing.   Abdominal:      General: Abdomen is flat. Bowel sounds are normal. There is no distension.      Palpations: Abdomen is soft.      Tenderness: There is no abdominal tenderness. There is no guarding.   Skin:     General: Skin is warm and dry.      Coloration: Skin is not jaundiced.   Neurological:      Mental Status: She is alert.   Psychiatric:         Attention and Perception:  Attention normal.         Mood and Affect: Affect normal.         Speech: Speech normal.         Behavior: Behavior is cooperative.      Comments: Pt was calm while speaking.         Significant Labs:  CBC:   Recent Labs   Lab 01/16/24  1131   WBC 11.49*   HGB 11.4*   HCT 34.8*   *     CMP:   Recent Labs   Lab 01/16/24  1131   *   CALCIUM 9.7   ALBUMIN 3.6   PROT 7.6      K 4.5   CO2 33*      BUN 7   CREATININE 0.75   ALKPHOS 102   ALT 14   AST 12*   BILITOT 0.2       Significant Imaging:  Imaging results within the past 24 hours have been reviewed.    Assessment/Plan:     There are no hospital problems to display for this patient.        Imp: UC, blood per rectum  Plan: colonoscopy    Chris Estevez MD  Gastroenterology  [facility]

## 2024-01-17 NOTE — ANESTHESIA PREPROCEDURE EVALUATION
01/17/2024  Isabella Kelly is a 68 y.o., female.      Pre-op Assessment    I have reviewed the Patient Summary Reports.     I have reviewed the Nursing Notes. I have reviewed the NPO Status.   I have reviewed the Medications.     Review of Systems  Anesthesia Hx:   History of prior surgery of interest to airway management or planning:          Denies Family Hx of Anesthesia complications.   Personal Hx of Anesthesia complications, Post-Operative Nausea/Vomiting, in the past, but not with recent anesthetics / prophylaxis                    Social:  Non-Smoker       Cardiovascular:     Hypertension           hyperlipidemia                             Hepatic/GI:   PUD,  GERD             Musculoskeletal:  Arthritis               Endocrine:   Hypothyroidism              Physical Exam  General: Well nourished, Alert and Oriented    Airway:  Mallampati: II   Mouth Opening: Normal  TM Distance: Normal  Tongue: Normal  Neck ROM: Normal ROM    Dental:  Intact        Anesthesia Plan  Type of Anesthesia, risks & benefits discussed:    Anesthesia Type: Gen Natural Airway  Intra-op Monitoring Plan: Standard ASA Monitors  Post Op Pain Control Plan: multimodal analgesia  Induction:  IV  Informed Consent: Informed consent signed with the Patient and all parties understand the risks and agree with anesthesia plan.  All questions answered. Patient consented to blood products? Yes  ASA Score: 2  Day of Surgery Review of History & Physical: H&P Update referred to the surgeon/provider.    Ready For Surgery From Anesthesia Perspective.     .

## 2024-01-17 NOTE — TRANSFER OF CARE
Anesthesia Transfer of Care Note    Patient: Isabella Kelly    Procedure(s) Performed: * No procedures listed *    Patient location: GI    Anesthesia Type: general    Transport from OR: Transported from OR on room air with adequate spontaneous ventilation. Continuous ECG monitoring in transport. Continuous SpO2 monitoring in transport    Post pain: adequate analgesia    Post assessment: no apparent anesthetic complications    Post vital signs: stable    Level of consciousness: sedated and responds to stimulation    Nausea/Vomiting: no nausea/vomiting    Complications: none    Transfer of care protocol was followedComments: Good SV continue, NAD, VSS, RTRN      Last vitals: Visit Vitals  BP (!) 105/50 (BP Location: Right arm, Patient Position: Lying)   Pulse 97   Temp 37 °C (98.6 °F) (Oral)   Resp 18   SpO2 100%   Breastfeeding No

## 2024-01-17 NOTE — DISCHARGE INSTRUCTIONS
Procedure Date  1/17/24     Impression  Overall Impression:   2 polyps were removed  Performed forceps biopsies in the ascending colon to rule out colitis  Performed forceps biopsies in the transverse colon to rule out colitis  Performed forceps biopsies in the descending colon to rule out colitis  Performed forceps biopsies in the sigmoid colon to rule out colitis  Performed forceps biopsies in the rectum  Diverticulosis in the ascending colon, descending colon and sigmoid colon  Pan diverticulosis is noted. One ascending colon polyp was removed with hot snare polypectomy and one transverse colon polyp was removed with biopsy.   Severe active inflammation is noted in the rectum and sigmoid . Less inflammation is seen in the descending colon. The transverse colon and more proximal colon is without gross inflammation.     Recommendation    Await pathology results     Repeat colonoscopy in 1 year      Avoid nsaids; Check HIV and HEP C (anticipate change in biological therapy). Add prednisone 20mg/day. Return to GI clinic next week with SUSI Bryan.  Discharge: disp; DC to home. Resume diet. No driving x 24 hours. F/U with PCP as scheduled.  Dx: Active ulcerative colitis, colon diverticulosis, two polyps were removed during this exam.    NO DRIVING, OPERATING EQUIPMENT, OR SIGNING LEGAL DOCUMENTS FOR 24 HOURS.  THE NURSE WILL CALL YOU WITH YOUR BIOPSY RESULTS IN A FEW DAYS.

## 2024-01-17 NOTE — ANESTHESIA POSTPROCEDURE EVALUATION
Anesthesia Post Evaluation    Patient: Isabella Kelly    Procedure(s) Performed: * No procedures listed *    Final Anesthesia Type: general      Patient location during evaluation: GI PACU  Patient participation: Yes- Able to Participate  Level of consciousness: awake and alert  Post-procedure vital signs: reviewed and stable  Pain management: adequate  Airway patency: patent    PONV status at discharge: No PONV  Anesthetic complications: no      Cardiovascular status: blood pressure returned to baseline and hemodynamically stable  Respiratory status: spontaneous ventilation  Hydration status: euvolemic  Follow-up not needed.  Comments: Refer to nursing notes for pain/zen score upon discharge from recovery.              Vitals Value Taken Time   /50 01/17/24 1328   Temp 37 °C (98.6 °F) 01/17/24 1328   Pulse 97 01/17/24 1328   Resp 18 01/17/24 1328   SpO2 100 % 01/17/24 1328         No case tracking events are documented in the log.      Pain/Zen Score: No data recorded

## 2024-01-18 NOTE — PROGRESS NOTES
The ascending colon polyp was a tubular adenoma. Distal, severely active ulcerative colitis is confirmed with biopsies. Recommend: prednisone was started on day of colonoscopy and pt will return to GI clinic and anticipate changing biological agent for UC.

## 2024-01-29 ENCOUNTER — OFFICE VISIT (OUTPATIENT)
Dept: GASTROENTEROLOGY | Facility: CLINIC | Age: 69
End: 2024-01-29
Payer: MEDICARE

## 2024-01-29 VITALS
RESPIRATION RATE: 16 BRPM | DIASTOLIC BLOOD PRESSURE: 79 MMHG | SYSTOLIC BLOOD PRESSURE: 122 MMHG | BODY MASS INDEX: 26.67 KG/M2 | WEIGHT: 176 LBS | HEART RATE: 85 BPM | HEIGHT: 68 IN

## 2024-01-29 DIAGNOSIS — K51.919 ULCERATIVE COLITIS WITH COMPLICATION, UNSPECIFIED LOCATION: Primary | ICD-10-CM

## 2024-01-29 DIAGNOSIS — K51.319 CHRONIC ULCERATIVE RECTOSIGMOIDITIS WITH COMPLICATION: ICD-10-CM

## 2024-01-29 PROCEDURE — 99214 OFFICE O/P EST MOD 30 MIN: CPT | Mod: S$PBB,,, | Performed by: NURSE PRACTITIONER

## 2024-01-29 PROCEDURE — 99214 OFFICE O/P EST MOD 30 MIN: CPT | Mod: PBBFAC | Performed by: NURSE PRACTITIONER

## 2024-01-29 RX ORDER — DIPHENHYDRAMINE HYDROCHLORIDE 50 MG/ML
50 INJECTION INTRAMUSCULAR; INTRAVENOUS ONCE AS NEEDED
Status: CANCELLED | OUTPATIENT
Start: 2024-01-29

## 2024-01-29 RX ORDER — IPRATROPIUM BROMIDE AND ALBUTEROL SULFATE 2.5; .5 MG/3ML; MG/3ML
3 SOLUTION RESPIRATORY (INHALATION)
Status: CANCELLED | OUTPATIENT
Start: 2024-01-29

## 2024-01-29 RX ORDER — ACETAMINOPHEN 325 MG/1
650 TABLET ORAL
Status: CANCELLED | OUTPATIENT
Start: 2024-01-29

## 2024-01-29 RX ORDER — HEPARIN 100 UNIT/ML
500 SYRINGE INTRAVENOUS
Status: CANCELLED | OUTPATIENT
Start: 2024-01-29

## 2024-01-29 RX ORDER — SODIUM CHLORIDE 0.9 % (FLUSH) 0.9 %
10 SYRINGE (ML) INJECTION
Status: CANCELLED | OUTPATIENT
Start: 2024-01-29

## 2024-01-29 RX ORDER — EPINEPHRINE 0.3 MG/.3ML
0.3 INJECTION SUBCUTANEOUS ONCE AS NEEDED
Status: CANCELLED | OUTPATIENT
Start: 2024-01-29

## 2024-01-29 NOTE — PROGRESS NOTES
Isabella Kelly is a 68 y.o. female here for Follow-up        PCP: Jack Swift  Referring Provider: No referring provider defined for this encounter.     HPI:  Presents for follow-up due to ulcerative colitis. Patient had a colonoscopy on 01/17/2024, moderately active severe colitis noted in the sigmoid and the rectum.  No sign of any granuloma.  Patient is currently receiving Entyvio infusions every 8 weeks.  Calprotectin in December was 703.  Prednisone 20 mg daily was prescribed at the time of the colonoscopy. States that today was the first day that she has had some improvement in her symptoms.  She is having rectal bleeding with mucus in her stool. Abdominal cramping along with fecal urgency.  Endorses generalized myalgia and joint pain.  Patient has previously failed mesalamine, budesonide, zeposia, and has now failed Entyvio.  Hepatitis-B surface antigen is negative, hepatitis-B core is negative, TB gold is negative, HIV is negative.  CRP increased to 3.96.  Due to severity of pathology for moderate to severe colitis on recent colonoscopy we will recommend changing biologic therapy.  She has not had a TNF blocker in the past.  We will recommend Remicade infusions.  She is aware of increased risk of infection and certain malignancies with biologic therapy.  Is agreeable to Remicade.  Written instructions for Remicade given to the patient today.  She is already scheduled for an Entyvio infusion for Monday.  We will transition to Remicade.    Follow-up  Associated symptoms include abdominal pain. Pertinent negatives include no change in bowel habit, chest pain, fatigue, fever, nausea, vomiting or weakness.         ROS:  Review of Systems   Constitutional:  Negative for appetite change, fatigue, fever and unexpected weight change.   HENT:  Negative for trouble swallowing.    Respiratory:  Negative for shortness of breath.    Cardiovascular:  Negative for chest pain.   Gastrointestinal:  Positive for  abdominal pain, blood in stool and diarrhea. Negative for anal bleeding, change in bowel habit, constipation, nausea, rectal pain, vomiting and reflux.   Musculoskeletal:  Negative for gait problem.   Integumentary:  Negative for pallor.   Neurological:  Negative for weakness.   Hematological:  Does not bruise/bleed easily.   Psychiatric/Behavioral:  The patient is not nervous/anxious.           PMHX:  has a past medical history of Acquired hypothyroidism (1/13/2023), Bacterial vaginosis, Cystocele with rectocele (11/28/2007), Cystocele, midline (12/12/2007), Depressive disorder (09/2004), Dysmenorrhea (2006), Esophageal reflux, Granulation tissue (08/12/2008), Hypercholesterolemia (09/20/2004), Hyperlipidemia (10/26/2011), Hypertension, Hypertriglyceridemia (10/04/2010), Hypertriglyceridemia (10/04/2010), IBD (inflammatory bowel disease), Iron deficiency anemia due to chronic blood loss (12/19/2022), Large uterus, Menorrhagia, Non-healing wound of left lower extremity (12/12/2022), Polymenorrhea, PONV (postoperative nausea and vomiting), Post-hysterectomy menopause, Rectocele, Stress incontinence, Ulcerative colitis (08/2020), Uterine prolapse, and Vaginal discharge.    PSHX:  has a past surgical history that includes anterior/posterior colporrhaphy with vaginal enterocele repair (06/18/2008); Appendectomy; Colonoscopy; Dilation and curettage of uterus; Hysteroscopy (11/28/2007); Endometrial biopsy (02/16/2006); ENDOMETRIAL BIOPSY REPEAT  (11/06/2007); SILVER NITRATE CAUTERY OF VAGINAL WALL (08/12/2008); SIS (11/15/2007); Total abdominal hysterectomy; and TOT SLING (06/18/2008).    PFHX: Family history is unknown by patient.    PSlHX:  reports that she has never smoked. She has never used smokeless tobacco. She reports that she does not drink alcohol and does not use drugs.        Review of patient's allergies indicates:  No Known Allergies    Medication List with Changes/Refills   Current Medications    ESTRADIOL  "(ESTRACE) 1 MG TABLET    Take 1 tablet (1 mg total) by mouth once daily.    LEVOTHYROXINE (SYNTHROID) 100 MCG TABLET    Take 100 mcg by mouth before breakfast.    LISINOPRIL (PRINIVIL,ZESTRIL) 5 MG TABLET    Take 5 mg by mouth once daily.    OMEPRAZOLE (PRILOSEC) 40 MG CAPSULE    Take 40 mg by mouth 2 (two) times daily.    PREDNISONE (DELTASONE) 20 MG TABLET    Take 1 tablet (20 mg total) by mouth once daily.    ROSUVASTATIN (CRESTOR) 10 MG TABLET    TAKE 1 TABLET BY MOUTH EVERY DAY WITH SELENIUM 200 MCG        Objective Findings:  Vital Signs:  /79 (BP Location: Right arm, Patient Position: Sitting)   Pulse 85   Resp 16   Ht 5' 8" (1.727 m)   Wt 79.8 kg (176 lb)   BMI 26.76 kg/m²  Body mass index is 26.76 kg/m².    Physical Exam:  Physical Exam  Vitals and nursing note reviewed.   Constitutional:       General: She is not in acute distress.     Appearance: Normal appearance.   HENT:      Mouth/Throat:      Mouth: Mucous membranes are moist.   Cardiovascular:      Rate and Rhythm: Normal rate.   Pulmonary:      Effort: Pulmonary effort is normal.      Breath sounds: No wheezing, rhonchi or rales.   Abdominal:      General: Bowel sounds are normal. There is no distension.      Palpations: Abdomen is soft. There is no mass.      Tenderness: There is no abdominal tenderness. There is no guarding.   Skin:     General: Skin is warm and dry.      Coloration: Skin is not jaundiced or pale.   Neurological:      Mental Status: She is alert and oriented to person, place, and time.   Psychiatric:         Mood and Affect: Mood normal.          Labs:  Lab Results   Component Value Date    WBC 11.49 (H) 01/16/2024    HGB 11.4 (L) 01/16/2024    HCT 34.8 (L) 01/16/2024    MCV 93.0 01/16/2024    RDW 11.9 01/16/2024     (H) 01/16/2024    LYMPH 16.3 (L) 01/16/2024    LYMPH 1.87 01/16/2024    MONO 8.8 (H) 01/16/2024    EOS 0.49 01/16/2024    BASO 0.04 01/16/2024     Lab Results   Component Value Date     " 01/16/2024    K 4.5 01/16/2024     01/16/2024    CO2 33 (H) 01/16/2024     (H) 01/16/2024    BUN 7 01/16/2024    CREATININE 0.75 01/16/2024    CALCIUM 9.7 01/16/2024    PROT 7.6 01/16/2024    ALBUMIN 3.6 01/16/2024    BILITOT 0.2 01/16/2024    ALKPHOS 102 01/16/2024    AST 12 (L) 01/16/2024    ALT 14 01/16/2024         Imaging: Colonoscopy    Result Date: 1/17/2024  Table formatting from the original result was not included. Procedure Date 1/17/24 Impression Overall      2 polyps were removed Performed forceps biopsies in the ascending colon to rule out colitis Performed forceps biopsies in the transverse colon to rule out colitis Performed forceps biopsies in the descending colon to rule out colitis Performed forceps biopsies in the sigmoid colon to rule out colitis Performed forceps biopsies in the rectum Diverticulosis in the ascending colon, descending colon and sigmoid colon Pan diverticulosis is noted. One ascending colon polyp was removed with hot snare polypectomy and one transverse colon polyp was removed with biopsy. Severe active inflammation is noted in the rectum and sigmoid . Less inflammation is seen in the descending colon. The transverse colon and more proximal colon is without gross inflammation. Recommendation  Await pathology results  Repeat colonoscopy in 1 year Avoid nsaids; Check HIV and HEP C (anticipate change in biological therapy). Add prednisone 20mg/day. Return to GI clinic next week with SUSI Bryan. Discharge: disp; DC to home. Resume diet. No driving x 24 hours. F/U with PCP as scheduled. Dx: Active ulcerative colitis, colon diverticulosis, two polyps were removed during this exam. Indication Iron deficiency anemia, unspecified iron deficiency anemia type, Ulcerative colitis with complication, unspecified location Providers Jack Hudson Jr., Christianne Lawrence CRNA Technician Chris Estevez MD Proceduralist Loni Zuniga RN Registered Nurse Artis,  Raphael TADEO CRNA CRNA Medications Moderate sedation administered by anesthesia staff - See anesthesia record. Preprocedure A history and physical has been performed, and patient medication allergies have been reviewed. The patient's tolerance of previous anesthesia has been reviewed. The risks and benefits of the procedure and the sedation options and risks were discussed with the patient. All questions were answered and informed consent obtained. ASA Score: ASA 3 - Patient with moderate systemic disease with functional limitations Mallampati Airway Score: II (hard and soft palate, upper portion of tonsils anduvula visible) Details of the Procedure The patient underwent monitored anesthesia care, which was administered by an anesthesia professional. The patient's heart rate, blood pressure, level of consciousness, respirations, oxygen, ECG and ETCO2 were monitored throughout the procedure. A digital rectal exam was performed. A perianal exam was performed. The scope was introduced through the anus and advanced to the cecum. Retroflexion was performed in the rectum. The quality of bowel preparation was evaluated using the Cabery Bowel Preparation Scale with scores of: right colon = 2, transverse colon = 2, left colon = 2. The total BBPS score was 6. Bowel prep was adequate. The patient's estimated blood loss was minimal (<5 mL). The procedure was not difficult. The patient tolerated the procedure well. There were no apparent adverse events. Scope: Colonoscope Scope Serial: [id number] Events Procedure Events Event Event Time Procedure Events Event Event Time ENDO SCOPE IN TIME 1/17/2024  1:11 PM ENDO CECUM REACHED 1/17/2024  1:19 PM ENDO SCOPE OUT TIME 1/17/2024  1:29 PM CECAL WITHDRAWAL TIME: 9m 35s Findings One polyp in the ascending colon; performed hot snare with complete en bloc removal and retrieved specimen Performed multiple forceps biopsies in the ascending colon to rule out colitis. For ulcerative colitis  Performed multiple forceps biopsies in the transverse colon to rule out colitis. For ulcerative colitis One polyp in the transverse colon; performed cold forceps biopsy with complete en bloc removal Performed multiple forceps biopsies in the descending colon to rule out colitis. For ulcerative colitis Performed multiple forceps biopsies in the sigmoid colon to rule out colitis. For ulcerative colitis Performed multiple forceps biopsies in the rectum. For ulcerative colitis Diverticula in the ascending colon, descending colon and sigmoid colon; no bleeding was identified         Assessment:  Isabella Kelly is a 68 y.o. female here with:  1. Ulcerative colitis with complication, unspecified location    2. Chronic ulcerative rectosigmoiditis with complication          Recommendations:  1. Patient will proceed with Entyvio infusion that is already scheduled for Monday  2. We will need to transition to Remicade 5 milligrams/kilogram at 0, 2 and 6 weeks then every 8 weeks thereafter  3. Avoid NSAID's  4. Continue Prednisone as directed  Follow up first week of March    No follow-ups on file.      Order summary:       Thank you for allowing me to participate in the care of Isabella Kelly.      VANDANA Neville

## 2024-01-30 ENCOUNTER — PATIENT MESSAGE (OUTPATIENT)
Dept: GASTROENTEROLOGY | Facility: CLINIC | Age: 69
End: 2024-01-30
Payer: MEDICARE

## 2024-01-31 DIAGNOSIS — K51.919 ULCERATIVE COLITIS WITH COMPLICATION, UNSPECIFIED LOCATION: Primary | ICD-10-CM

## 2024-01-31 DIAGNOSIS — D53.9 NUTRITIONAL ANEMIA, UNSPECIFIED: Primary | ICD-10-CM

## 2024-01-31 DIAGNOSIS — Z78.0 POSTMENOPAUSAL: ICD-10-CM

## 2024-02-05 ENCOUNTER — INFUSION (OUTPATIENT)
Dept: INFUSION THERAPY | Facility: HOSPITAL | Age: 69
End: 2024-02-05
Attending: OBSTETRICS & GYNECOLOGY
Payer: MEDICARE

## 2024-02-05 VITALS
DIASTOLIC BLOOD PRESSURE: 79 MMHG | HEART RATE: 85 BPM | TEMPERATURE: 98 F | WEIGHT: 170 LBS | SYSTOLIC BLOOD PRESSURE: 129 MMHG | BODY MASS INDEX: 25.85 KG/M2 | OXYGEN SATURATION: 94 %

## 2024-02-05 DIAGNOSIS — K51.019 ULCERATIVE PANCOLITIS WITH COMPLICATION: Primary | ICD-10-CM

## 2024-02-05 PROCEDURE — 25000003 PHARM REV CODE 250: Performed by: NURSE PRACTITIONER

## 2024-02-05 PROCEDURE — 96415 CHEMO IV INFUSION ADDL HR: CPT

## 2024-02-05 PROCEDURE — 63600175 PHARM REV CODE 636 W HCPCS: Mod: JZ,JG | Performed by: NURSE PRACTITIONER

## 2024-02-05 PROCEDURE — 96413 CHEMO IV INFUSION 1 HR: CPT

## 2024-02-05 RX ORDER — HEPARIN 100 UNIT/ML
500 SYRINGE INTRAVENOUS
Status: CANCELLED | OUTPATIENT
Start: 2024-04-01

## 2024-02-05 RX ORDER — EPINEPHRINE 0.3 MG/.3ML
0.3 INJECTION SUBCUTANEOUS ONCE AS NEEDED
Status: DISCONTINUED | OUTPATIENT
Start: 2024-02-05 | End: 2024-02-05 | Stop reason: HOSPADM

## 2024-02-05 RX ORDER — IPRATROPIUM BROMIDE AND ALBUTEROL SULFATE 2.5; .5 MG/3ML; MG/3ML
3 SOLUTION RESPIRATORY (INHALATION)
Status: CANCELLED | OUTPATIENT
Start: 2024-04-01

## 2024-02-05 RX ORDER — HEPARIN SODIUM (PORCINE) LOCK FLUSH IV SOLN 100 UNIT/ML 100 UNIT/ML
500 SOLUTION INTRAVENOUS
Status: DISCONTINUED | OUTPATIENT
Start: 2024-02-05 | End: 2024-02-05 | Stop reason: HOSPADM

## 2024-02-05 RX ORDER — EPINEPHRINE 0.3 MG/.3ML
0.3 INJECTION SUBCUTANEOUS ONCE AS NEEDED
Status: CANCELLED | OUTPATIENT
Start: 2024-04-01

## 2024-02-05 RX ORDER — ACETAMINOPHEN 325 MG/1
650 TABLET ORAL
Status: CANCELLED | OUTPATIENT
Start: 2024-04-01

## 2024-02-05 RX ORDER — SODIUM CHLORIDE 0.9 % (FLUSH) 0.9 %
10 SYRINGE (ML) INJECTION
Status: CANCELLED | OUTPATIENT
Start: 2024-04-01

## 2024-02-05 RX ORDER — SODIUM CHLORIDE 0.9 % (FLUSH) 0.9 %
10 SYRINGE (ML) INJECTION
Status: DISCONTINUED | OUTPATIENT
Start: 2024-02-05 | End: 2024-02-05 | Stop reason: HOSPADM

## 2024-02-05 RX ORDER — DIPHENHYDRAMINE HYDROCHLORIDE 50 MG/ML
50 INJECTION INTRAMUSCULAR; INTRAVENOUS ONCE AS NEEDED
Status: CANCELLED | OUTPATIENT
Start: 2024-04-01

## 2024-02-05 RX ORDER — DIPHENHYDRAMINE HYDROCHLORIDE 50 MG/ML
50 INJECTION INTRAMUSCULAR; INTRAVENOUS ONCE AS NEEDED
Status: DISCONTINUED | OUTPATIENT
Start: 2024-02-05 | End: 2024-02-05 | Stop reason: HOSPADM

## 2024-02-05 RX ADMIN — INFLIXIMAB 390 MG: 100 INJECTION, POWDER, LYOPHILIZED, FOR SOLUTION INTRAVENOUS at 08:02

## 2024-02-05 NOTE — PROGRESS NOTES
0815 Pt here for Remicade infusion, resting in recliner, TP released and pharmacy notified    Discussed with pt possible side effects due it being her first infusion of remicade  0845 Remicade infusion started at 10ml/hr with filter applied, will increase to max rate of 250ml/hr as tolerated  1050 Remicade infusion complete, tolerated well, will continue to monitor  1120 pt discharged ambulatory with no adverse reaction noted, pt notified to go to ER with any adverse reactions, AVS given along with medication information  Follow up appt made 2 weeks

## 2024-02-12 ENCOUNTER — HOSPITAL ENCOUNTER (OUTPATIENT)
Dept: RADIOLOGY | Facility: HOSPITAL | Age: 69
Discharge: HOME | End: 2024-02-12
Attending: NURSE PRACTITIONER
Payer: MEDICARE

## 2024-02-12 DIAGNOSIS — Z78.0 POSTMENOPAUSAL: ICD-10-CM

## 2024-02-12 DIAGNOSIS — D53.9 NUTRITIONAL ANEMIA, UNSPECIFIED: ICD-10-CM

## 2024-02-12 PROCEDURE — 77080 DXA BONE DENSITY AXIAL: CPT | Mod: TC

## 2024-02-12 PROCEDURE — 77080 DXA BONE DENSITY AXIAL: CPT | Mod: 26,,, | Performed by: RADIOLOGY

## 2024-02-20 ENCOUNTER — INFUSION (OUTPATIENT)
Dept: INFUSION THERAPY | Facility: HOSPITAL | Age: 69
End: 2024-02-20
Attending: OBSTETRICS & GYNECOLOGY
Payer: MEDICARE

## 2024-02-20 ENCOUNTER — OFFICE VISIT (OUTPATIENT)
Dept: OBSTETRICS AND GYNECOLOGY | Facility: CLINIC | Age: 69
End: 2024-02-20
Payer: MEDICARE

## 2024-02-20 VITALS
HEART RATE: 87 BPM | SYSTOLIC BLOOD PRESSURE: 148 MMHG | BODY MASS INDEX: 27.01 KG/M2 | DIASTOLIC BLOOD PRESSURE: 91 MMHG | HEIGHT: 68 IN | WEIGHT: 178.19 LBS | RESPIRATION RATE: 16 BRPM

## 2024-02-20 VITALS
OXYGEN SATURATION: 98 % | DIASTOLIC BLOOD PRESSURE: 86 MMHG | TEMPERATURE: 98 F | HEART RATE: 88 BPM | WEIGHT: 170 LBS | SYSTOLIC BLOOD PRESSURE: 133 MMHG | BODY MASS INDEX: 25.85 KG/M2 | RESPIRATION RATE: 17 BRPM

## 2024-02-20 DIAGNOSIS — Z01.419 ENCOUNTER FOR ANNUAL ROUTINE GYNECOLOGICAL EXAMINATION: Primary | ICD-10-CM

## 2024-02-20 DIAGNOSIS — I10 PRIMARY HYPERTENSION: ICD-10-CM

## 2024-02-20 DIAGNOSIS — E03.9 ACQUIRED HYPOTHYROIDISM: ICD-10-CM

## 2024-02-20 DIAGNOSIS — K51.019 ULCERATIVE PANCOLITIS WITH COMPLICATION: Primary | ICD-10-CM

## 2024-02-20 DIAGNOSIS — K51.30 CHRONIC ULCERATIVE RECTOSIGMOIDITIS WITHOUT COMPLICATIONS: ICD-10-CM

## 2024-02-20 DIAGNOSIS — E78.5 HYPERLIPIDEMIA, UNSPECIFIED HYPERLIPIDEMIA TYPE: ICD-10-CM

## 2024-02-20 DIAGNOSIS — N95.1 MENOPAUSE SYNDROME: ICD-10-CM

## 2024-02-20 DIAGNOSIS — K57.30 DIVERTICULOSIS OF COLON: ICD-10-CM

## 2024-02-20 DIAGNOSIS — D72.829 LEUKOCYTOSIS, UNSPECIFIED TYPE: ICD-10-CM

## 2024-02-20 LAB
BILIRUB UR QL STRIP: NEGATIVE
CLARITY UR: CLEAR
COLOR UR: COLORLESS
CTP QC/QA: YES
FECAL OCCULT BLOOD, POC: NEGATIVE
GLUCOSE UR STRIP-MCNC: NORMAL MG/DL
KETONES UR STRIP-SCNC: NEGATIVE MG/DL
LEUKOCYTE ESTERASE UR QL STRIP: NEGATIVE
MUCOUS, UA: ABNORMAL /LPF
NITRITE UR QL STRIP: NEGATIVE
PH UR STRIP: 7 PH UNITS
PROT UR QL STRIP: NEGATIVE
RBC # UR STRIP: NEGATIVE /UL
RBC #/AREA URNS HPF: 1 /HPF
SP GR UR STRIP: 1.01
SQUAMOUS #/AREA URNS LPF: ABNORMAL /HPF
UROBILINOGEN UR STRIP-ACNC: NORMAL MG/DL
WBC #/AREA URNS HPF: 1 /HPF

## 2024-02-20 PROCEDURE — 99459 PELVIC EXAMINATION: CPT | Mod: PBBFAC | Performed by: OBSTETRICS & GYNECOLOGY

## 2024-02-20 PROCEDURE — 25000003 PHARM REV CODE 250: Performed by: NURSE PRACTITIONER

## 2024-02-20 PROCEDURE — 82270 OCCULT BLOOD FECES: CPT | Mod: PBBFAC | Performed by: OBSTETRICS & GYNECOLOGY

## 2024-02-20 PROCEDURE — 99459 PELVIC EXAMINATION: CPT | Mod: S$PBB,,, | Performed by: OBSTETRICS & GYNECOLOGY

## 2024-02-20 PROCEDURE — 96413 CHEMO IV INFUSION 1 HR: CPT

## 2024-02-20 PROCEDURE — 63600175 PHARM REV CODE 636 W HCPCS: Mod: JZ,JG | Performed by: NURSE PRACTITIONER

## 2024-02-20 PROCEDURE — 96415 CHEMO IV INFUSION ADDL HR: CPT

## 2024-02-20 PROCEDURE — 82270 OCCULT BLOOD FECES: CPT | Mod: PBBFAC,91 | Performed by: OBSTETRICS & GYNECOLOGY

## 2024-02-20 PROCEDURE — 99999PBSHW POCT OCCULT BLOOD STOOL: Mod: PBBFAC,,,

## 2024-02-20 PROCEDURE — 81001 URINALYSIS AUTO W/SCOPE: CPT | Mod: ,,, | Performed by: CLINICAL MEDICAL LABORATORY

## 2024-02-20 PROCEDURE — G0123 SCREEN CERV/VAG THIN LAYER: HCPCS | Mod: TC,GCY | Performed by: OBSTETRICS & GYNECOLOGY

## 2024-02-20 PROCEDURE — G0101 CA SCREEN;PELVIC/BREAST EXAM: HCPCS | Mod: S$PBB,GZ,, | Performed by: OBSTETRICS & GYNECOLOGY

## 2024-02-20 PROCEDURE — 99213 OFFICE O/P EST LOW 20 MIN: CPT | Mod: PBBFAC,25 | Performed by: OBSTETRICS & GYNECOLOGY

## 2024-02-20 PROCEDURE — 99999PBSHW PR PBB SHADOW TECHNICAL ONLY FILED TO HB: Mod: PBBFAC,,,

## 2024-02-20 RX ORDER — SODIUM CHLORIDE 0.9 % (FLUSH) 0.9 %
10 SYRINGE (ML) INJECTION
Status: CANCELLED | OUTPATIENT
Start: 2024-04-01

## 2024-02-20 RX ORDER — INFLIXIMAB 100 MG/10ML
INJECTION, POWDER, LYOPHILIZED, FOR SOLUTION INTRAVENOUS
COMMUNITY

## 2024-02-20 RX ORDER — ESTRADIOL 1 MG/1
1 TABLET ORAL DAILY
Qty: 90 TABLET | Refills: 3 | Status: SHIPPED | OUTPATIENT
Start: 2024-02-20 | End: 2025-02-19

## 2024-02-20 RX ORDER — DIPHENHYDRAMINE HYDROCHLORIDE 50 MG/ML
50 INJECTION INTRAMUSCULAR; INTRAVENOUS ONCE AS NEEDED
Status: CANCELLED | OUTPATIENT
Start: 2024-04-01

## 2024-02-20 RX ORDER — IPRATROPIUM BROMIDE AND ALBUTEROL SULFATE 2.5; .5 MG/3ML; MG/3ML
3 SOLUTION RESPIRATORY (INHALATION)
Status: CANCELLED | OUTPATIENT
Start: 2024-04-01

## 2024-02-20 RX ORDER — HEPARIN SODIUM (PORCINE) LOCK FLUSH IV SOLN 100 UNIT/ML 100 UNIT/ML
500 SOLUTION INTRAVENOUS
Status: DISCONTINUED | OUTPATIENT
Start: 2024-02-20 | End: 2024-02-20 | Stop reason: HOSPADM

## 2024-02-20 RX ORDER — HEPARIN 100 UNIT/ML
500 SYRINGE INTRAVENOUS
Status: CANCELLED | OUTPATIENT
Start: 2024-04-01

## 2024-02-20 RX ORDER — ACETAMINOPHEN 325 MG/1
650 TABLET ORAL
Status: CANCELLED | OUTPATIENT
Start: 2024-04-01

## 2024-02-20 RX ORDER — SODIUM CHLORIDE 0.9 % (FLUSH) 0.9 %
10 SYRINGE (ML) INJECTION
Status: DISCONTINUED | OUTPATIENT
Start: 2024-02-20 | End: 2024-02-20 | Stop reason: HOSPADM

## 2024-02-20 RX ORDER — EPINEPHRINE 0.3 MG/.3ML
0.3 INJECTION SUBCUTANEOUS ONCE AS NEEDED
Status: DISCONTINUED | OUTPATIENT
Start: 2024-02-20 | End: 2024-02-20 | Stop reason: HOSPADM

## 2024-02-20 RX ORDER — DIPHENHYDRAMINE HYDROCHLORIDE 50 MG/ML
50 INJECTION INTRAMUSCULAR; INTRAVENOUS ONCE AS NEEDED
Status: DISCONTINUED | OUTPATIENT
Start: 2024-02-20 | End: 2024-02-20 | Stop reason: HOSPADM

## 2024-02-20 RX ORDER — EPINEPHRINE 0.3 MG/.3ML
0.3 INJECTION SUBCUTANEOUS ONCE AS NEEDED
Status: CANCELLED | OUTPATIENT
Start: 2024-04-01

## 2024-02-20 RX ADMIN — INFLIXIMAB 390 MG: 100 INJECTION, POWDER, LYOPHILIZED, FOR SOLUTION INTRAVENOUS at 08:02

## 2024-02-20 NOTE — PROGRESS NOTES
0810 Pt here for Remicade infusion, resting in recliner, TP released and pharmacy notified  0837 Remicade infusion started to infuse over 2 hrs with filter applied (pharmacy barcode system down)  1044 Remicade infusion complete, tolerated well, will continue to monitor  1110 pt discharged ambulatory with no adverse reaction noted, pt notified to go to ER with any adverse reactions, AVS given along with medication information  Follow up appt made for 4 weeks

## 2024-02-20 NOTE — PATIENT INSTRUCTIONS
Dr. Jack Swift thanks you for this annual exam visit at [facility]    Please remember to perform monthly breast self exams. If you are over 40 years of age, Dr. Swift recommends yearly mammograms. Please check with your insurance carrier for mammogram insurance coverage.    Colonoscopy indication:      Low risk family history: schedule after age 50 for initial evaluation by a GI specialist.    High risk family history: Schedule before age 50 for initial evaluation by a GI specialist. High risk family history includes history of colon cancer in an offspring, brother or sister or parent.    The Annual Exam or periodic exam may includes thin prep Pap smear and appropriate ancillary labs as allowed by your insurance coverage.The studies Dr. Jack Swift ordered has been checked by our [employer] Electronic Medical Record software today.     Please use perscribed medications as directed.    Special considerations after this visit:   Limited screening labs since other labs were performed by her gastroenterologist.  The patient does need a repeat CBC since her white [place] was elevated [place] [address] on 01/16/2024; recommend urinalysis; hemoccult screen was negative today               Dr. Swift recommends renewal of hormone replacement therapy for the next year since she was symptomatic in the absence of the therapy; Dr. Swift does recommend a bone density study in 2 years-most recent was 02/13/2024 and the study revealed normal findings with no evidence of osteopenia.            Dr. Swift does recommend yearly mammography and monthly breast self-exam      Please practice best food and exercise habits for your age.    Dr. Jack Swift recommends avoidance of smoking and illicit medications or poor health habits.    Please call or schedule for any change in your health condition.     Dr. Jack Swift recommends yearly exams for good health maintenance even if you pap smear schedule (as allowed by your health insurance  coverage)  does not allow yearly pap testing.    Dr. Jack Swift    02/20/2024 1:22 PM

## 2024-02-20 NOTE — PROGRESS NOTES
Isabella Kelly female  for   Chief Complaint   Patient presents with    Well Woman     Last pap 2021    Annual exam and renewal of medication      OB History          3    Para   3    Term   3            AB        Living   3         SAB        IAB        Ectopic        Multiple        Live Births                      PHI:  Patient presents for her annual examination.  She is currently 68 years of age  3, para 3 who has undergone in the distant past by Dr. Jack Swift a total abdominal hysterectomy with ovarian preservation, anterior-posterior colporrhaphy and a trans obturator tape urethral sling procedure for urinary stress incontinence and pelvic relaxation symptoms.  The patient also at that time had an abdominal plasty by Dr. Luis Antonio Avelar.    Patient has no gynecologic problems.  She has no incontinence of urine.  She has no dyspareunia and is sexually active.  The patient has exhausted her supply of estrogen replacement therapy and developed hot flashes that she states requires therapy.    Patient's medical conditions complicated by symptomatic recurrent ulcerative colitis.    Patient's most recent mammogram was at [facility] [place] and according to the patient who has a nurse the report was negative for any abnormality.    Past Medical History:   Diagnosis Date    Acquired hypothyroidism 2023    Bacterial vaginosis     Cystocele with rectocele 2007    1st degree cystocele; 2nd degree rectocele    Cystocele, midline 2007    midline    Depressive disorder 2004    mild    Dysmenorrhea     severe first day and getting worse    Esophageal reflux     Granulation tissue 2008    5 - 8 mm long lower post repair granulation tissue - treated with sliver nitrate cautery    Hypercholesterolemia 2004    cholesterol is 219 mg/dl    Hyperlipidemia 10/26/2011    mile;  TG  71 mg/dl;  HDL  67 mg/dl;  LDL  136 mg/dl;  total cholesterol  " 217 mg/dl.  10/13/2017:  LDL  163 mg/dl;  total cholesterol  260 mg/dl; triglycerides  186 mg/dl;  HDL  66 mg/dl    Hypertension     Hypertriglyceridemia 10/04/2010    216 mg/dl;  [id number]    Hypertriglyceridemia 10/04/2010    216  mg/dll  02/01/2013 - recent kevek us 238 mg/dl    IBD (inflammatory bowel disease)     Iron deficiency anemia due to chronic blood loss 12/19/2022    Large uterus     Menorrhagia     Non-healing wound of left lower extremity 12/12/2022    Polymenorrhea     PONV (postoperative nausea and vomiting)     Post-hysterectomy menopause     Rectocele     Stress incontinence     Ulcerative colitis 08/2020    Ulcerative colitis confirmed by colonoscopy by Dr. Ho Capellan    Uterine prolapse     Vaginal discharge       Past Surgical History:   Procedure Laterality Date    anterior/posterior colporrhaphy with vaginal enterocele repair  06/18/2008    APPENDECTOMY      COLONOSCOPY      DILATION AND CURETTAGE OF UTERUS      [id number]    ENDOMETRIAL BIOPSY  02/16/2006    ENDOMETRIAL BIOPSY REPEAT   11/06/2007    HYSTEROSCOPY  11/28/2007    SILVER NITRATE CAUTERY OF VAGINAL WALL  08/12/2008    SIS  11/15/2007    TOT SLING  06/18/2008    TOTAL ABDOMINAL HYSTERECTOMY        Review of patient's allergies indicates:  No Known Allergies     ROS:Pertinent items are noted in HPI.    Physical exam:This gyn related exam was chaperoned by a female medical assistant.      BP (!) 148/91   Pulse 87   Resp 16   Ht 5' 8" (1.727 m)   Wt 80.8 kg (178 lb 3.2 oz)   BMI 27.10 kg/m²      General Appearance: healthy, alert, no distress, smiling               HEENT: Head: Normocephalic, no lesions, without obvious abnormality.    Neuro: normal exam    Lymphatic: no palpable lymphadenopathy, no hepatosplenomegaly    Chest:            Breast: normal appearance, no masses or tenderness, Inspection negative, No nipple retraction or dimpling, No nipple discharge or bleeding, No axillary or supraclavicular adenopathy, Normal " to palpation without dominant masses            Lung: chest clear, no wheezing, rales, normal symmetric air entry            Heart: regular rate and rhythm, S1, S2 normal, no murmur, click, rub or gallop    Abdomen: soft, non-tender, without masses or organomegaly, normal bowel sounds, without guarding, without rebound, and stigma of abdominoplasty with no evidence of ventral incisional hernia; no ascites; no abdominal bruit on auscultation    Pelvic:            Vulva: Normal vulva: no lesions, masses, epithelial changes, or exudate           Vagina: normal mucosa, no discharge, well-supported vaginal vault with no cystocele, no vault prolapse and no rectocele.  There is no urethrocele.  There is no erosion of the trans obturator tape urethral sling arms.  There is no urethrocele.  There is no gross urinary stress incontinence.           Uterus: Uterus / cervix surgically absent           Adnexae:  Adnexa on left and right side not palpable-due to age and there is no evidence of any pelvic masses.  Rectal exam does agree with these findings.    Rectal: negative, stool guaiac negative    Extremity: normal, extremities warm, no clubbing, no cyanosis, no edema, non-tender; no CVA tenderness bilaterally; good back alignment    Skin: normal exam        Assessment:   Problem List Items Addressed This Visit          Cardiac/Vascular    Hyperlipidemia    Primary hypertension       Endocrine    Acquired hypothyroidism       GI    Chronic ulcerative rectosigmoiditis    Diverticulosis of colon     Other Visit Diagnoses       Encounter for annual routine gynecological examination    -  Primary    Menopause syndrome                 Plan:  Limited screening labs since other labs were performed by her gastroenterologist.  The patient does need a repeat CBC since her white [place] was elevated [place] [address] on 01/16/2024; recommend urinalysis; hemoccult screen was negative today               Dr. Swift recommends renewal of hormone  replacement therapy for the next year since she was symptomatic in the absence of the therapy; Dr. Swift does recommend a bone density study in 2 years-most recent was 02/13/2024 and the study revealed normal findings with no evidence of osteopenia.            Dr. Swift does recommend yearly mammography and monthly breast self-exam

## 2024-03-05 ENCOUNTER — PATIENT MESSAGE (OUTPATIENT)
Dept: GASTROENTEROLOGY | Facility: CLINIC | Age: 69
End: 2024-03-05
Payer: MEDICARE

## 2024-03-19 ENCOUNTER — INFUSION (OUTPATIENT)
Dept: INFUSION THERAPY | Facility: HOSPITAL | Age: 69
End: 2024-03-19
Attending: OBSTETRICS & GYNECOLOGY
Payer: MEDICARE

## 2024-03-19 VITALS
HEART RATE: 67 BPM | BODY MASS INDEX: 27.06 KG/M2 | WEIGHT: 178 LBS | DIASTOLIC BLOOD PRESSURE: 76 MMHG | RESPIRATION RATE: 17 BRPM | TEMPERATURE: 98 F | OXYGEN SATURATION: 100 % | SYSTOLIC BLOOD PRESSURE: 136 MMHG

## 2024-03-19 DIAGNOSIS — K51.019 ULCERATIVE PANCOLITIS WITH COMPLICATION: Primary | ICD-10-CM

## 2024-03-19 PROCEDURE — 96415 CHEMO IV INFUSION ADDL HR: CPT

## 2024-03-19 PROCEDURE — 63600175 PHARM REV CODE 636 W HCPCS: Mod: JZ,JG | Performed by: NURSE PRACTITIONER

## 2024-03-19 PROCEDURE — 96413 CHEMO IV INFUSION 1 HR: CPT

## 2024-03-19 PROCEDURE — 25000003 PHARM REV CODE 250: Performed by: NURSE PRACTITIONER

## 2024-03-19 RX ORDER — DIPHENHYDRAMINE HYDROCHLORIDE 50 MG/ML
50 INJECTION INTRAMUSCULAR; INTRAVENOUS ONCE AS NEEDED
Status: DISCONTINUED | OUTPATIENT
Start: 2024-03-19 | End: 2024-03-19 | Stop reason: HOSPADM

## 2024-03-19 RX ORDER — HEPARIN SODIUM (PORCINE) LOCK FLUSH IV SOLN 100 UNIT/ML 100 UNIT/ML
500 SOLUTION INTRAVENOUS
Status: DISCONTINUED | OUTPATIENT
Start: 2024-03-19 | End: 2024-03-19 | Stop reason: HOSPADM

## 2024-03-19 RX ORDER — DIPHENHYDRAMINE HYDROCHLORIDE 50 MG/ML
50 INJECTION INTRAMUSCULAR; INTRAVENOUS ONCE AS NEEDED
Status: CANCELLED | OUTPATIENT
Start: 2024-04-01

## 2024-03-19 RX ORDER — ACETAMINOPHEN 325 MG/1
650 TABLET ORAL
Status: CANCELLED | OUTPATIENT
Start: 2024-04-01

## 2024-03-19 RX ORDER — SODIUM CHLORIDE 0.9 % (FLUSH) 0.9 %
10 SYRINGE (ML) INJECTION
Status: DISCONTINUED | OUTPATIENT
Start: 2024-03-19 | End: 2024-03-19 | Stop reason: HOSPADM

## 2024-03-19 RX ORDER — SODIUM CHLORIDE 0.9 % (FLUSH) 0.9 %
10 SYRINGE (ML) INJECTION
Status: CANCELLED | OUTPATIENT
Start: 2024-04-01

## 2024-03-19 RX ORDER — HEPARIN 100 UNIT/ML
500 SYRINGE INTRAVENOUS
Status: CANCELLED | OUTPATIENT
Start: 2024-04-01

## 2024-03-19 RX ORDER — EPINEPHRINE 0.3 MG/.3ML
0.3 INJECTION SUBCUTANEOUS ONCE AS NEEDED
Status: DISCONTINUED | OUTPATIENT
Start: 2024-03-19 | End: 2024-03-19 | Stop reason: HOSPADM

## 2024-03-19 RX ORDER — EPINEPHRINE 0.3 MG/.3ML
0.3 INJECTION SUBCUTANEOUS ONCE AS NEEDED
Status: CANCELLED | OUTPATIENT
Start: 2024-04-01

## 2024-03-19 RX ORDER — IPRATROPIUM BROMIDE AND ALBUTEROL SULFATE 2.5; .5 MG/3ML; MG/3ML
3 SOLUTION RESPIRATORY (INHALATION)
Status: CANCELLED | OUTPATIENT
Start: 2024-04-01

## 2024-03-19 RX ADMIN — INFLIXIMAB 400 MG: 100 INJECTION, POWDER, LYOPHILIZED, FOR SOLUTION INTRAVENOUS at 08:03

## 2024-03-19 NOTE — PROGRESS NOTES
0800 Pt here for Remicade infusion, resting in recliner, TP released and pharmacy notified  0827 Remicade infusion started to infuse over 2 hrs with filter applied  1015 Remicade infusion complete, tolerated well, will continue to monitor  1030 pt discharged ambulatory with no adverse reaction noted, pt notified to go to ER with any adverse reactions, AVS given along with medication information  Follow up appt made 8 weeks

## 2024-05-08 DIAGNOSIS — M70.62 GREATER TROCHANTERIC BURSITIS OF LEFT HIP: Primary | ICD-10-CM

## 2024-05-13 ENCOUNTER — HOSPITAL ENCOUNTER (OUTPATIENT)
Dept: RADIOLOGY | Facility: HOSPITAL | Age: 69
Discharge: HOME | End: 2024-05-13
Attending: ORTHOPAEDIC SURGERY
Payer: MEDICARE

## 2024-05-13 ENCOUNTER — OFFICE VISIT (OUTPATIENT)
Dept: ORTHOPEDICS | Facility: CLINIC | Age: 69
End: 2024-05-13
Payer: MEDICARE

## 2024-05-13 DIAGNOSIS — M70.62 GREATER TROCHANTERIC BURSITIS OF LEFT HIP: ICD-10-CM

## 2024-05-13 DIAGNOSIS — M70.62 GREATER TROCHANTERIC BURSITIS OF LEFT HIP: Primary | ICD-10-CM

## 2024-05-13 PROCEDURE — 99212 OFFICE O/P EST SF 10 MIN: CPT | Mod: PBBFAC,25 | Performed by: ORTHOPAEDIC SURGERY

## 2024-05-13 PROCEDURE — 20610 DRAIN/INJ JOINT/BURSA W/O US: CPT | Mod: S$PBB,LT,, | Performed by: ORTHOPAEDIC SURGERY

## 2024-05-13 PROCEDURE — 20610 DRAIN/INJ JOINT/BURSA W/O US: CPT | Mod: PBBFAC,LT | Performed by: ORTHOPAEDIC SURGERY

## 2024-05-13 PROCEDURE — 63600175 PHARM REV CODE 636 W HCPCS

## 2024-05-13 PROCEDURE — 99214 OFFICE O/P EST MOD 30 MIN: CPT | Mod: S$PBB,25,, | Performed by: ORTHOPAEDIC SURGERY

## 2024-05-13 PROCEDURE — 73502 X-RAY EXAM HIP UNI 2-3 VIEWS: CPT | Mod: 26,LT,, | Performed by: ORTHOPAEDIC SURGERY

## 2024-05-13 PROCEDURE — 99999PBSHW PR PBB SHADOW TECHNICAL ONLY FILED TO HB: Mod: PBBFAC,,,

## 2024-05-13 PROCEDURE — 73502 X-RAY EXAM HIP UNI 2-3 VIEWS: CPT | Mod: TC,LT

## 2024-05-13 RX ORDER — MELOXICAM 15 MG/1
15 TABLET ORAL DAILY PRN
Qty: 30 TABLET | Refills: 2 | Status: SHIPPED | OUTPATIENT
Start: 2024-05-13

## 2024-05-13 RX ADMIN — TRIAMCINOLONE ACETONIDE 40 MG: 40 INJECTION, SUSPENSION INTRA-ARTICULAR; INTRAMUSCULAR at 10:05

## 2024-05-13 RX ADMIN — BUPIVACAINE HYDROCHLORIDE 2.5 MG: 2.5 INJECTION, SOLUTION INFILTRATION; PERINEURAL at 10:05

## 2024-05-13 NOTE — PROGRESS NOTES
CLINIC NOTE       Chief Complaint   Patient presents with    Left Hip - Pain        Isabella Kelly is a 68 y.o. female seen today for recheck of her left hip.  She was known to me having undergone bilateral TKR in the past.  She was asymptomatic with her right knee.  She occasionally has some symptoms suggestive of quadriceps tendinitis on the left only symptomatic with stair negotiation.  She was also had recurrent discomfort over the left GT bursal region.  She underwent left GT bursal injection 6 months ago with good relief of symptoms until recently.  She has symptoms on the left side when she sleeps on her left hip but not with ambulation.  She has a history of ulcerative colitis but does use oral anti-inflammatory medications judiciously on a p.r.n. basis.    Past Medical History:   Diagnosis Date    Acquired hypothyroidism 1/13/2023    Bacterial vaginosis     Cystocele with rectocele 11/28/2007    1st degree cystocele; 2nd degree rectocele    Cystocele, midline 12/12/2007    midline    Depressive disorder 09/2004    mild    Dysmenorrhea 2006    severe first day and getting worse    Esophageal reflux     Granulation tissue 08/12/2008    5 - 8 mm long lower post repair granulation tissue - treated with sliver nitrate cautery    Hypercholesterolemia 09/20/2004    cholesterol is 219 mg/dl    Hyperlipidemia 10/26/2011    mile;  TG  71 mg/dl;  HDL  67 mg/dl;  LDL  136 mg/dl;  total cholesterol  217 mg/dl.  10/13/2017:  LDL  163 mg/dl;  total cholesterol  260 mg/dl; triglycerides  186 mg/dl;  HDL  66 mg/dl    Hypertension     Hypertriglyceridemia 10/04/2010    216 mg/dl;  [id number]    Hypertriglyceridemia 10/04/2010    216  mg/dll  02/01/2013 - recent kevek us 238 mg/dl    IBD (inflammatory bowel disease)     Iron deficiency anemia due to chronic blood loss 12/19/2022    Large uterus     Menorrhagia     Non-healing wound of left lower extremity 12/12/2022    Polymenorrhea     PONV (postoperative nausea and  vomiting)     Post-hysterectomy menopause     Rectocele     Stress incontinence     Ulcerative colitis 08/2020    Ulcerative colitis confirmed by colonoscopy by Dr. Ho Capellan    Uterine prolapse     Vaginal discharge      Family History   Family history unknown: Yes     Current Outpatient Medications on File Prior to Visit   Medication Sig Dispense Refill    estradioL (ESTRACE) 1 MG tablet Take 1 tablet (1 mg total) by mouth once daily. 90 tablet 3    inFLIXimab (REMICADE) 100 mg injection as directed Intravenous      levothyroxine (SYNTHROID) 100 MCG tablet Take 100 mcg by mouth before breakfast.      lisinopriL (PRINIVIL,ZESTRIL) 5 MG tablet Take 5 mg by mouth once daily.      omeprazole (PRILOSEC) 40 MG capsule Take 40 mg by mouth 2 (two) times daily.      rosuvastatin (CRESTOR) 10 MG tablet TAKE 1 TABLET BY MOUTH EVERY DAY WITH SELENIUM 200 MCG       Current Facility-Administered Medications on File Prior to Visit   Medication Dose Route Frequency Provider Last Rate Last Admin    diphenhydrAMINE injection 25 mg  25 mg Intravenous Once Erma Echevarria, SAUL        diphenhydrAMINE injection 25 mg  25 mg Intravenous Once Erma Echevarria, SAUL        diphenhydrAMINE injection 25 mg  25 mg Intravenous Once Erma Echevarria, ANGELICAP           ROS     There were no vitals filed for this visit.    Past Surgical History:   Procedure Laterality Date    anterior/posterior colporrhaphy with vaginal enterocele repair  06/18/2008    APPENDECTOMY      COLONOSCOPY      DILATION AND CURETTAGE OF UTERUS      [id number]    ENDOMETRIAL BIOPSY  02/16/2006    ENDOMETRIAL BIOPSY REPEAT   11/06/2007    HYSTEROSCOPY  11/28/2007    SILVER NITRATE CAUTERY OF VAGINAL WALL  08/12/2008    SIS  11/15/2007    TOT SLING  06/18/2008    TOTAL ABDOMINAL HYSTERECTOMY          Review of patient's allergies indicates:  No Known Allergies     Ortho Exam : Leg lengths appear equal.  Left knee incision well healed without signs of  infection.  No effusion.  There is tenderness palpation of the left GT bursal region reproducing symptoms.  Radiographic Examination:  Left hip 05/13/2024    Technique:  Two views AP and lateral projection    Findings:  Bones well mineralized.  Hip joint is located.  There is no evidence of fracture, dislocation, pathologic bone or significant DJD.    Impression:   See Above    Assessment and Plan  Patient Active Problem List    Diagnosis Date Noted    Polyp of transverse colon 01/17/2024    Adenomatous polyp of ascending colon 01/17/2024    Acquired hypothyroidism 01/13/2023    Anxiety 01/13/2023    Chronic left-sided low back pain without sciatica 01/13/2023    Chronic ulcerative rectosigmoiditis 01/13/2023    Diverticulosis of colon 01/13/2023    Hyperlipidemia 01/13/2023    Neck sprain 01/13/2023    Hormone replacement therapy 01/13/2023    Primary hypertension 01/13/2023    Arthritis 01/13/2023    Osteoarthritis of knee 01/13/2023    Iron deficiency anemia 12/19/2022    Ulcerative colitis with complication 12/12/2022    Non-healing wound of left lower extremity 12/12/2022    H/O total knee replacement, left 10/14/2022    Trochanteric bursitis of left hip 06/06/2022    History of total right knee replacement 10/13/2021    Primary osteoarthritis of right knee 07/19/2021    Impression:  Left greater trochanteric bursitis-left hip   Plan:  Proximal lateral aspect of the left thigh and hip were prepped with Betadine and greater trochanteric bursa injected with triamcinolone and Marcaine 1 cc each.  Rx Mobic 15 mg 1 p.o. q.d. p.r.n..      Deep Langston M.D.

## 2024-05-14 ENCOUNTER — INFUSION (OUTPATIENT)
Dept: INFUSION THERAPY | Facility: HOSPITAL | Age: 69
End: 2024-05-14
Attending: FAMILY MEDICINE
Payer: MEDICARE

## 2024-05-14 VITALS
RESPIRATION RATE: 17 BRPM | TEMPERATURE: 98 F | BODY MASS INDEX: 27.06 KG/M2 | OXYGEN SATURATION: 96 % | DIASTOLIC BLOOD PRESSURE: 75 MMHG | SYSTOLIC BLOOD PRESSURE: 145 MMHG | HEART RATE: 75 BPM | WEIGHT: 178 LBS

## 2024-05-14 DIAGNOSIS — K51.019 ULCERATIVE PANCOLITIS WITH COMPLICATION: Primary | ICD-10-CM

## 2024-05-14 PROCEDURE — 63600175 PHARM REV CODE 636 W HCPCS: Mod: JZ,JG | Performed by: NURSE PRACTITIONER

## 2024-05-14 PROCEDURE — 96415 CHEMO IV INFUSION ADDL HR: CPT

## 2024-05-14 PROCEDURE — 96413 CHEMO IV INFUSION 1 HR: CPT

## 2024-05-14 PROCEDURE — 25000003 PHARM REV CODE 250: Performed by: NURSE PRACTITIONER

## 2024-05-14 RX ORDER — SODIUM CHLORIDE 0.9 % (FLUSH) 0.9 %
10 SYRINGE (ML) INJECTION
OUTPATIENT
Start: 2024-05-28

## 2024-05-14 RX ORDER — DIPHENHYDRAMINE HYDROCHLORIDE 50 MG/ML
50 INJECTION INTRAMUSCULAR; INTRAVENOUS ONCE AS NEEDED
OUTPATIENT
Start: 2024-05-28

## 2024-05-14 RX ORDER — ACETAMINOPHEN 325 MG/1
650 TABLET ORAL
OUTPATIENT
Start: 2024-05-28

## 2024-05-14 RX ORDER — EPINEPHRINE 0.3 MG/.3ML
0.3 INJECTION SUBCUTANEOUS ONCE AS NEEDED
Status: DISCONTINUED | OUTPATIENT
Start: 2024-05-14 | End: 2024-05-14 | Stop reason: HOSPADM

## 2024-05-14 RX ORDER — DIPHENHYDRAMINE HYDROCHLORIDE 50 MG/ML
50 INJECTION INTRAMUSCULAR; INTRAVENOUS ONCE AS NEEDED
Status: DISCONTINUED | OUTPATIENT
Start: 2024-05-14 | End: 2024-05-14 | Stop reason: HOSPADM

## 2024-05-14 RX ORDER — IPRATROPIUM BROMIDE AND ALBUTEROL SULFATE 2.5; .5 MG/3ML; MG/3ML
3 SOLUTION RESPIRATORY (INHALATION)
OUTPATIENT
Start: 2024-05-28

## 2024-05-14 RX ORDER — HEPARIN 100 UNIT/ML
500 SYRINGE INTRAVENOUS
OUTPATIENT
Start: 2024-05-28

## 2024-05-14 RX ORDER — HEPARIN 100 UNIT/ML
500 SYRINGE INTRAVENOUS
Status: DISCONTINUED | OUTPATIENT
Start: 2024-05-14 | End: 2024-05-14 | Stop reason: HOSPADM

## 2024-05-14 RX ORDER — SODIUM CHLORIDE 0.9 % (FLUSH) 0.9 %
10 SYRINGE (ML) INJECTION
Status: DISCONTINUED | OUTPATIENT
Start: 2024-05-14 | End: 2024-05-14 | Stop reason: HOSPADM

## 2024-05-14 RX ORDER — EPINEPHRINE 0.3 MG/.3ML
0.3 INJECTION SUBCUTANEOUS ONCE AS NEEDED
OUTPATIENT
Start: 2024-05-28

## 2024-05-14 RX ADMIN — SODIUM CHLORIDE: 9 INJECTION, SOLUTION INTRAVENOUS at 08:05

## 2024-05-14 RX ADMIN — INFLIXIMAB 400 MG: 100 INJECTION, POWDER, LYOPHILIZED, FOR SOLUTION INTRAVENOUS at 08:05

## 2024-05-14 NOTE — PROGRESS NOTES
0810 Pt here for Remicade infusion, resting in recliner, TP released and pharmacy notified  0827 Remicade infusion started to infuse over 2 hrs with filter applied  1025 Remicade infusion complete, tolerated well, will continue to monitor  1045 pt discharged ambulatory with no adverse reaction noted, pt notified to go to ER with any adverse reactions, AVS given along with medication information  Follow up appt made 8 weeks

## 2024-05-20 RX ORDER — TRIAMCINOLONE ACETONIDE 40 MG/ML
40 INJECTION, SUSPENSION INTRA-ARTICULAR; INTRAMUSCULAR ONCE
Status: COMPLETED | OUTPATIENT
Start: 2024-05-13 | End: 2024-05-13

## 2024-05-20 RX ORDER — BUPIVACAINE HYDROCHLORIDE 2.5 MG/ML
1 INJECTION, SOLUTION INFILTRATION; PERINEURAL
Status: COMPLETED | OUTPATIENT
Start: 2024-05-13 | End: 2024-05-13

## 2024-05-31 ENCOUNTER — OFFICE VISIT (OUTPATIENT)
Dept: FAMILY MEDICINE | Facility: CLINIC | Age: 69
End: 2024-05-31
Payer: MEDICARE

## 2024-05-31 VITALS
DIASTOLIC BLOOD PRESSURE: 81 MMHG | SYSTOLIC BLOOD PRESSURE: 133 MMHG | BODY MASS INDEX: 25.76 KG/M2 | HEART RATE: 71 BPM | RESPIRATION RATE: 17 BRPM | HEIGHT: 68 IN | OXYGEN SATURATION: 99 % | WEIGHT: 170 LBS | TEMPERATURE: 98 F

## 2024-05-31 DIAGNOSIS — J06.9 UPPER RESPIRATORY TRACT INFECTION, UNSPECIFIED TYPE: Primary | ICD-10-CM

## 2024-05-31 PROCEDURE — 99213 OFFICE O/P EST LOW 20 MIN: CPT | Mod: ,,, | Performed by: NURSE PRACTITIONER

## 2024-05-31 RX ORDER — METHYLPREDNISOLONE 4 MG/1
TABLET ORAL
Qty: 21 EACH | Refills: 0 | Status: SHIPPED | OUTPATIENT
Start: 2024-05-31

## 2024-05-31 RX ORDER — IPRATROPIUM BROMIDE 21 UG/1
2 SPRAY, METERED NASAL 2 TIMES DAILY
Qty: 30 ML | Refills: 0 | Status: SHIPPED | OUTPATIENT
Start: 2024-05-31

## 2024-05-31 RX ORDER — CETIRIZINE HYDROCHLORIDE, PSEUDOEPHEDRINE HYDROCHLORIDE 5; 120 MG/1; MG/1
1 TABLET, FILM COATED, EXTENDED RELEASE ORAL 2 TIMES DAILY
Qty: 20 TABLET | Refills: 0 | Status: SHIPPED | OUTPATIENT
Start: 2024-05-31 | End: 2024-06-10

## 2024-05-31 NOTE — PROGRESS NOTES
[facility]    Chief Complaint      Chief Complaint   Patient presents with    Nasal Congestion    Cough     Started last Friday. Refused any testing.       History of Present Illness      Isabella Kelly is a 68 y.o. female. She  has a past medical history of Acquired hypothyroidism (1/13/2023), Bacterial vaginosis, Cystocele with rectocele (11/28/2007), Cystocele, midline (12/12/2007), Depressive disorder (09/2004), Dysmenorrhea (2006), Esophageal reflux, Granulation tissue (08/12/2008), Hypercholesterolemia (09/20/2004), Hyperlipidemia (10/26/2011), Hypertension, Hypertriglyceridemia (10/04/2010), Hypertriglyceridemia (10/04/2010), IBD (inflammatory bowel disease), Iron deficiency anemia due to chronic blood loss (12/19/2022), Large uterus, Menorrhagia, Non-healing wound of left lower extremity (12/12/2022), Polymenorrhea, PONV (postoperative nausea and vomiting), Post-hysterectomy menopause, Rectocele, Stress incontinence, Ulcerative colitis (08/2020), Uterine prolapse, and Vaginal discharge., who presents today for cough and congestion x1 week.    Past Medical History:  Past Medical History:   Diagnosis Date    Acquired hypothyroidism 1/13/2023    Bacterial vaginosis     Cystocele with rectocele 11/28/2007    1st degree cystocele; 2nd degree rectocele    Cystocele, midline 12/12/2007    midline    Depressive disorder 09/2004    mild    Dysmenorrhea 2006    severe first day and getting worse    Esophageal reflux     Granulation tissue 08/12/2008    5 - 8 mm long lower post repair granulation tissue - treated with sliver nitrate cautery    Hypercholesterolemia 09/20/2004    cholesterol is 219 mg/dl    Hyperlipidemia 10/26/2011    mile;  TG  71 mg/dl;  HDL  67 mg/dl;  LDL  136 mg/dl;  total cholesterol  217 mg/dl.  10/13/2017:  LDL  163 mg/dl;  total cholesterol  260 mg/dl; triglycerides  186 mg/dl;  HDL  66 mg/dl    Hypertension     Hypertriglyceridemia 10/04/2010    216 mg/dl;  [id number]     Hypertriglyceridemia 10/04/2010    216  mg/dll  02/01/2013 - recent kevek us 238 mg/dl    IBD (inflammatory bowel disease)     Iron deficiency anemia due to chronic blood loss 12/19/2022    Large uterus     Menorrhagia     Non-healing wound of left lower extremity 12/12/2022    Polymenorrhea     PONV (postoperative nausea and vomiting)     Post-hysterectomy menopause     Rectocele     Stress incontinence     Ulcerative colitis 08/2020    Ulcerative colitis confirmed by colonoscopy by Dr. Ho Capellan    Uterine prolapse     Vaginal discharge        Past Surgical History:   has a past surgical history that includes anterior/posterior colporrhaphy with vaginal enterocele repair (06/18/2008); Appendectomy; Colonoscopy; Dilation and curettage of uterus; Hysteroscopy (11/28/2007); Endometrial biopsy (02/16/2006); ENDOMETRIAL BIOPSY REPEAT  (11/06/2007); SILVER NITRATE CAUTERY OF VAGINAL WALL (08/12/2008); SIS (11/15/2007); Total abdominal hysterectomy; and TOT SLING (06/18/2008).    Social History:  Social History     Tobacco Use    Smoking status: Never    Smokeless tobacco: Never   Substance Use Topics    Alcohol use: Never    Drug use: Never       I personally reviewed all past medical, surgical, and social.     Review of Systems   Constitutional:  Negative for chills and fever.   HENT:  Positive for postnasal drip and rhinorrhea. Negative for ear pain and sore throat.    Respiratory:  Positive for cough and wheezing. Negative for shortness of breath.    Cardiovascular:  Negative for chest pain.   Musculoskeletal:  Negative for myalgias.   Skin:  Negative for rash.   Allergic/Immunologic: Negative for environmental allergies.   Neurological:  Positive for headaches.        Medications:  Outpatient Encounter Medications as of 5/31/2024   Medication Sig Dispense Refill    estradioL (ESTRACE) 1 MG tablet Take 1 tablet (1 mg total) by mouth once daily. 90 tablet 3    inFLIXimab (REMICADE) 100 mg injection as directed  "Intravenous      levothyroxine (SYNTHROID) 100 MCG tablet Take 100 mcg by mouth before breakfast.      lisinopriL (PRINIVIL,ZESTRIL) 5 MG tablet Take 5 mg by mouth once daily.      meloxicam (MOBIC) 15 MG tablet Take 1 tablet (15 mg total) by mouth daily as needed for Pain. 30 tablet 2    omeprazole (PRILOSEC) 40 MG capsule Take 40 mg by mouth 2 (two) times daily.      rosuvastatin (CRESTOR) 10 MG tablet TAKE 1 TABLET BY MOUTH EVERY DAY WITH SELENIUM 200 MCG      cetirizine-pseudoephedrine 5-120 mg Tb12 Take 1 tablet by mouth 2 (two) times a day. for 10 days 20 tablet 0    ipratropium (ATROVENT) 21 mcg (0.03 %) nasal spray 2 sprays by Each Nostril route 2 (two) times daily. 30 mL 0    methylPREDNISolone (MEDROL DOSEPACK) 4 mg tablet use as directed 21 each 0     Facility-Administered Encounter Medications as of 5/31/2024   Medication Dose Route Frequency Provider Last Rate Last Admin    diphenhydrAMINE injection 25 mg  25 mg Intravenous Once Erma Echevarria FNP        diphenhydrAMINE injection 25 mg  25 mg Intravenous Once Erma Echevarria FNP        diphenhydrAMINE injection 25 mg  25 mg Intravenous Once Erma Echevarria FNP           Allergies:  Review of patient's allergies indicates:  No Known Allergies    Health Maintenance:    There is no immunization history on file for this patient.   Health Maintenance   Topic Date Due    Lipid Panel  Never done    TETANUS VACCINE  Never done    Mammogram  Never done    Shingles Vaccine (1 of 2) Never done    Colorectal Cancer Screening  01/17/2025    DEXA Scan  02/12/2027    Hepatitis C Screening  Completed        Physical Exam      Vital Signs  Temp: 97.5 °F (36.4 °C)  Temp Source: Oral  Pulse: 71  Resp: 17  SpO2: 99 %  BP: 133/81  BP Location: Right arm  Patient Position: Sitting  Height and Weight  Height: 5' 8" (172.7 cm)  Weight: 77.1 kg (170 lb)  BSA (Calculated - sq m): 1.92 sq meters  BMI (Calculated): 25.9  Weight in (lb) to have BMI = 25: " 164.1]    Physical Exam  Vitals and nursing note reviewed.   Constitutional:       Appearance: Normal appearance. She is well-developed.   HENT:      Head: Normocephalic.      Right Ear: Hearing, tympanic membrane, ear canal and external ear normal.      Left Ear: Hearing, tympanic membrane, ear canal and external ear normal.      Nose: Congestion present.      Mouth/Throat:      Lips: Pink.      Pharynx: Oropharynx is clear.   Eyes:      General: Lids are normal.      Conjunctiva/sclera: Conjunctivae normal.   Cardiovascular:      Rate and Rhythm: Normal rate and regular rhythm.      Heart sounds: Normal heart sounds.   Pulmonary:      Effort: Pulmonary effort is normal.      Breath sounds: Normal breath sounds.   Musculoskeletal:         General: Normal range of motion.      Cervical back: Normal range of motion and neck supple.      Right lower leg: No edema.      Left lower leg: No edema.   Skin:     General: Skin is warm and dry.   Neurological:      Mental Status: She is alert and oriented to person, place, and time.      Gait: Gait is intact.   Psychiatric:         Behavior: Behavior is cooperative.          Laboratory:  CBC:  Recent Labs   Lab 11/07/23  0916 01/16/24  1131 02/20/24  1337   WBC 7.65 11.49 H 5.98   RBC 3.79 L 3.74 L 3.78 L   Hemoglobin 11.9 L 11.4 L 11.4 L   Hematocrit 35.3 L 34.8 L 34.5 L   Platelet Count 295 420 H 296   MCV 93.1 93.0 91.3   MCH 31.4 H 30.5 30.2   MCHC 33.7 32.8 33.0     CMP:  Recent Labs   Lab 12/12/22  1057 05/15/23  0919 01/16/24  1131   Glucose 100 89 108 H   Calcium 9.1 9.1 9.7   Albumin 3.0 L 3.6 3.6   Total Protein 7.8 7.2 7.6   Sodium 139 137 137   Potassium 3.8 4.2 4.5   CO2 28 31 33 H   Chloride 105 104 105   BUN 7 18 7   Alk Phos 107 81 102   ALT 13 15 14   AST 7 L 12 L 12 L   Bilirubin, Total 0.2 0.3 0.2     LIPIDS:  Recent Labs   Lab 12/21/21  0941   TSH 3.600     TSH:  Recent Labs   Lab 12/21/21  0941   TSH 3.600     A1C:        Assessment/Plan     Isabella ROSA  Leticia is a 68 y.o.female with:     1. Upper respiratory tract infection, unspecified type  -     ipratropium (ATROVENT) 21 mcg (0.03 %) nasal spray; 2 sprays by Each Nostril route 2 (two) times daily.  Dispense: 30 mL; Refill: 0  -     methylPREDNISolone (MEDROL DOSEPACK) 4 mg tablet; use as directed  Dispense: 21 each; Refill: 0  -     cetirizine-pseudoephedrine 5-120 mg Tb12; Take 1 tablet by mouth 2 (two) times a day. for 10 days  Dispense: 20 tablet; Refill: 0       Declined   Total time spent face-to-face and non-face-to-face coordinating care for this encounter was: 20 minutes     Chronic conditions status updated as per HPI.  Other than changes above, cont current medications and maintain follow up with specialists.  Return to clinic prn if symptoms worsen or fail to improve.    Anuja Tovar, P  [facility]  Answers submitted by the patient for this visit:  Cough Questionnaire (Submitted on 5/30/2024)  Chief Complaint: Cough  Chronicity: new  Onset: in the past 7 days  Progression since onset: waxing and waning  Frequency: every few minutes  Cough characteristics: productive of sputum  ear congestion: No  heartburn: No  hemoptysis: No  nasal congestion: Yes  sweats: No  weight loss: No  Aggravated by: nothing  asthma: No  bronchiectasis: No  bronchitis: Yes  COPD: No  emphysema: No  pneumonia: Yes  Treatments tried: OTC cough suppressant, cool air, prescription cough suppressant  Improvement on treatment: mild

## 2024-07-15 ENCOUNTER — INFUSION (OUTPATIENT)
Dept: INFUSION THERAPY | Facility: HOSPITAL | Age: 69
End: 2024-07-15
Attending: NURSE PRACTITIONER
Payer: MEDICARE

## 2024-07-15 VITALS
HEART RATE: 63 BPM | OXYGEN SATURATION: 96 % | WEIGHT: 170 LBS | TEMPERATURE: 98 F | RESPIRATION RATE: 17 BRPM | DIASTOLIC BLOOD PRESSURE: 87 MMHG | SYSTOLIC BLOOD PRESSURE: 152 MMHG | BODY MASS INDEX: 25.85 KG/M2

## 2024-07-15 DIAGNOSIS — K51.019 ULCERATIVE PANCOLITIS WITH COMPLICATION: Primary | ICD-10-CM

## 2024-07-15 PROCEDURE — 25000003 PHARM REV CODE 250: Performed by: NURSE PRACTITIONER

## 2024-07-15 PROCEDURE — 96413 CHEMO IV INFUSION 1 HR: CPT

## 2024-07-15 PROCEDURE — 96415 CHEMO IV INFUSION ADDL HR: CPT

## 2024-07-15 PROCEDURE — 63600175 PHARM REV CODE 636 W HCPCS: Mod: JZ,JG | Performed by: NURSE PRACTITIONER

## 2024-07-15 RX ORDER — HEPARIN 100 UNIT/ML
500 SYRINGE INTRAVENOUS
OUTPATIENT
Start: 2024-09-02

## 2024-07-15 RX ORDER — ACETAMINOPHEN 325 MG/1
650 TABLET ORAL
OUTPATIENT
Start: 2024-09-02

## 2024-07-15 RX ORDER — EPINEPHRINE 0.3 MG/.3ML
0.3 INJECTION SUBCUTANEOUS ONCE AS NEEDED
Status: DISCONTINUED | OUTPATIENT
Start: 2024-07-15 | End: 2024-07-15 | Stop reason: HOSPADM

## 2024-07-15 RX ORDER — IPRATROPIUM BROMIDE AND ALBUTEROL SULFATE 2.5; .5 MG/3ML; MG/3ML
3 SOLUTION RESPIRATORY (INHALATION)
OUTPATIENT
Start: 2024-09-02

## 2024-07-15 RX ORDER — DIPHENHYDRAMINE HYDROCHLORIDE 50 MG/ML
50 INJECTION INTRAMUSCULAR; INTRAVENOUS ONCE AS NEEDED
OUTPATIENT
Start: 2024-09-02

## 2024-07-15 RX ORDER — DIPHENHYDRAMINE HYDROCHLORIDE 50 MG/ML
50 INJECTION INTRAMUSCULAR; INTRAVENOUS ONCE AS NEEDED
Status: DISCONTINUED | OUTPATIENT
Start: 2024-07-15 | End: 2024-07-15 | Stop reason: HOSPADM

## 2024-07-15 RX ORDER — SODIUM CHLORIDE 0.9 % (FLUSH) 0.9 %
10 SYRINGE (ML) INJECTION
OUTPATIENT
Start: 2024-09-02

## 2024-07-15 RX ORDER — SODIUM CHLORIDE 0.9 % (FLUSH) 0.9 %
10 SYRINGE (ML) INJECTION
Status: DISCONTINUED | OUTPATIENT
Start: 2024-07-15 | End: 2024-07-15 | Stop reason: HOSPADM

## 2024-07-15 RX ORDER — EPINEPHRINE 0.3 MG/.3ML
0.3 INJECTION SUBCUTANEOUS ONCE AS NEEDED
OUTPATIENT
Start: 2024-09-02

## 2024-07-15 RX ADMIN — INFLIXIMAB 390 MG: 100 INJECTION, POWDER, LYOPHILIZED, FOR SOLUTION INTRAVENOUS at 08:07

## 2024-07-15 NOTE — PROGRESS NOTES
Patient arrived for Remicade infusion today ambulatory to bay. Therapy plan released and pharmacy notified. Patients int started to left AC.  0830 infusion started  1019 infusion finished  1019 Int flushed   1035 Patient discharged , given AVS and told to watch for signs and symptoms of adverse reactions , if had any to go to the ER. Patient given upcoming appointment

## 2024-08-13 DIAGNOSIS — M70.62 GREATER TROCHANTERIC BURSITIS OF LEFT HIP: ICD-10-CM

## 2024-08-13 RX ORDER — MELOXICAM 15 MG/1
15 TABLET ORAL DAILY PRN
Qty: 30 TABLET | Refills: 2 | Status: SHIPPED | OUTPATIENT
Start: 2024-08-13

## 2024-09-11 ENCOUNTER — INFUSION (OUTPATIENT)
Dept: INFUSION THERAPY | Facility: HOSPITAL | Age: 69
End: 2024-09-11
Attending: NURSE PRACTITIONER
Payer: MEDICARE

## 2024-09-11 VITALS
SYSTOLIC BLOOD PRESSURE: 137 MMHG | HEART RATE: 66 BPM | BODY MASS INDEX: 25.85 KG/M2 | OXYGEN SATURATION: 97 % | WEIGHT: 170 LBS | RESPIRATION RATE: 17 BRPM | DIASTOLIC BLOOD PRESSURE: 80 MMHG

## 2024-09-11 DIAGNOSIS — K51.019 ULCERATIVE PANCOLITIS WITH COMPLICATION: Primary | ICD-10-CM

## 2024-09-11 PROCEDURE — 25000003 PHARM REV CODE 250: Performed by: NURSE PRACTITIONER

## 2024-09-11 PROCEDURE — 96415 CHEMO IV INFUSION ADDL HR: CPT

## 2024-09-11 PROCEDURE — 63600175 PHARM REV CODE 636 W HCPCS: Mod: JZ,JG | Performed by: NURSE PRACTITIONER

## 2024-09-11 PROCEDURE — 96413 CHEMO IV INFUSION 1 HR: CPT

## 2024-09-11 RX ORDER — SODIUM CHLORIDE 0.9 % (FLUSH) 0.9 %
10 SYRINGE (ML) INJECTION
OUTPATIENT
Start: 2024-11-06

## 2024-09-11 RX ORDER — IPRATROPIUM BROMIDE AND ALBUTEROL SULFATE 2.5; .5 MG/3ML; MG/3ML
3 SOLUTION RESPIRATORY (INHALATION)
OUTPATIENT
Start: 2024-11-06

## 2024-09-11 RX ORDER — ACETAMINOPHEN 325 MG/1
650 TABLET ORAL
OUTPATIENT
Start: 2024-11-06

## 2024-09-11 RX ORDER — HEPARIN 100 UNIT/ML
500 SYRINGE INTRAVENOUS
OUTPATIENT
Start: 2024-11-06

## 2024-09-11 RX ORDER — DIPHENHYDRAMINE HYDROCHLORIDE 50 MG/ML
50 INJECTION INTRAMUSCULAR; INTRAVENOUS ONCE AS NEEDED
OUTPATIENT
Start: 2024-11-06

## 2024-09-11 RX ORDER — DIPHENHYDRAMINE HYDROCHLORIDE 50 MG/ML
50 INJECTION INTRAMUSCULAR; INTRAVENOUS ONCE AS NEEDED
Status: DISCONTINUED | OUTPATIENT
Start: 2024-09-11 | End: 2024-09-11 | Stop reason: HOSPADM

## 2024-09-11 RX ORDER — EPINEPHRINE 0.3 MG/.3ML
0.3 INJECTION SUBCUTANEOUS ONCE AS NEEDED
Status: DISCONTINUED | OUTPATIENT
Start: 2024-09-11 | End: 2024-09-11 | Stop reason: HOSPADM

## 2024-09-11 RX ORDER — EPINEPHRINE 0.3 MG/.3ML
0.3 INJECTION SUBCUTANEOUS ONCE AS NEEDED
OUTPATIENT
Start: 2024-11-06

## 2024-09-11 RX ORDER — SODIUM CHLORIDE 0.9 % (FLUSH) 0.9 %
10 SYRINGE (ML) INJECTION
Status: DISCONTINUED | OUTPATIENT
Start: 2024-09-11 | End: 2024-09-11 | Stop reason: HOSPADM

## 2024-09-11 RX ADMIN — INFLIXIMAB 390 MG: 100 INJECTION, POWDER, LYOPHILIZED, FOR SOLUTION INTRAVENOUS at 08:09

## 2024-09-11 NOTE — PROGRESS NOTES
Patient arrived for Remicade infusion today ambulatory to bay. Therapy plan released and pharmacy notified. Int started to left wrist.   0831 infusion started  1018 infusion finished  1018 Int flushed   1035 Patient discharged , given AVS and told to watch for signs and symptoms of adverse reactions , if had any to go to the ER. Patient given upcoming appointment

## 2024-09-19 DIAGNOSIS — M70.62 GREATER TROCHANTERIC BURSITIS OF LEFT HIP: ICD-10-CM

## 2024-09-20 RX ORDER — MELOXICAM 15 MG/1
15 TABLET ORAL DAILY PRN
Qty: 30 TABLET | Refills: 2 | Status: SHIPPED | OUTPATIENT
Start: 2024-09-20

## 2024-09-25 ENCOUNTER — OFFICE VISIT (OUTPATIENT)
Dept: GASTROENTEROLOGY | Facility: CLINIC | Age: 69
End: 2024-09-25
Payer: MEDICARE

## 2024-09-25 VITALS
DIASTOLIC BLOOD PRESSURE: 71 MMHG | HEIGHT: 68 IN | HEART RATE: 77 BPM | WEIGHT: 175.81 LBS | SYSTOLIC BLOOD PRESSURE: 131 MMHG | BODY MASS INDEX: 26.65 KG/M2 | OXYGEN SATURATION: 99 %

## 2024-09-25 DIAGNOSIS — K90.89 OTHER INTESTINAL MALABSORPTION: ICD-10-CM

## 2024-09-25 DIAGNOSIS — K51.919 ULCERATIVE COLITIS WITH COMPLICATION, UNSPECIFIED LOCATION: Primary | ICD-10-CM

## 2024-09-25 PROCEDURE — 99214 OFFICE O/P EST MOD 30 MIN: CPT | Mod: PBBFAC | Performed by: NURSE PRACTITIONER

## 2024-09-25 PROCEDURE — 99999 PR PBB SHADOW E&M-EST. PATIENT-LVL IV: CPT | Mod: PBBFAC,,, | Performed by: NURSE PRACTITIONER

## 2024-09-25 PROCEDURE — 99214 OFFICE O/P EST MOD 30 MIN: CPT | Mod: S$PBB,,, | Performed by: NURSE PRACTITIONER

## 2024-09-25 NOTE — PROGRESS NOTES
Isabella Kelly is a 68 y.o. female here for Follow-up (6 months, no problems)        PCP: Edy Villalpando  Referring Provider: No referring provider defined for this encounter.     HPI:  Presents for follow-up appointment due to ulcerative colitis.  The patient is currently receiving Remicade infusions.  The last infusion was 09/11/2024.  Remicade was started on 02/05/2024.  She was initially started on Entyvio but did not have clinical remission from Entyvio and we had to change biologic therapy to Remicade. She reports that she is much improved on Remicade therapy.  States that she has no abdominal pain, no hematochezia or melena, and no diarrhea.  Appetite is good.  States that she is able to eat without difficulty.  She states that at this point she does not even feel as if she has UC and is very pleased with the results that she has had from that Remicade infusion.  Her last colonoscopy was 01/17/2024, report reviewed, moderate active severe colitis in the sigmoid and rectum, no granulomas. She is scheduled for repeat colonoscopy in January to assess for mucosal healing.  Reports that she did require meloxicam for about 3 days due to a problem with her leg.  This has now resolved.  States that she tolerated the short term dose meloxicam without difficulty.  She does not routinely take NSAIDs.    Follow-up  Pertinent negatives include no abdominal pain, change in bowel habit, chest pain, fatigue, fever, nausea or vomiting.         ROS:  Review of Systems   Constitutional:  Negative for appetite change, fatigue and fever.   HENT:  Negative for trouble swallowing.    Cardiovascular:  Negative for chest pain.   Gastrointestinal:  Positive for anal bleeding. Negative for abdominal pain, blood in stool, change in bowel habit, constipation, diarrhea, nausea, rectal pain, vomiting and reflux.   Musculoskeletal:  Negative for gait problem.   Integumentary:  Negative for pallor.   Psychiatric/Behavioral:  The  patient is not nervous/anxious.           PMHX:  has a past medical history of Acquired hypothyroidism (1/13/2023), Bacterial vaginosis, Cystocele with rectocele (11/28/2007), Cystocele, midline (12/12/2007), Depressive disorder (09/2004), Dysmenorrhea (2006), Esophageal reflux, Granulation tissue (08/12/2008), Hypercholesterolemia (09/20/2004), Hyperlipidemia (10/26/2011), Hypertension, Hypertriglyceridemia (10/04/2010), Hypertriglyceridemia (10/04/2010), IBD (inflammatory bowel disease), Iron deficiency anemia due to chronic blood loss (12/19/2022), Large uterus, Menorrhagia, Non-healing wound of left lower extremity (12/12/2022), Polymenorrhea, PONV (postoperative nausea and vomiting), Post-hysterectomy menopause, Rectocele, Stress incontinence, Ulcerative colitis (08/2020), Uterine prolapse, and Vaginal discharge.    PSHX:  has a past surgical history that includes anterior/posterior colporrhaphy with vaginal enterocele repair (06/18/2008); Appendectomy; Colonoscopy; Dilation and curettage of uterus; Hysteroscopy (11/28/2007); Endometrial biopsy (02/16/2006); ENDOMETRIAL BIOPSY REPEAT  (11/06/2007); SILVER NITRATE CAUTERY OF VAGINAL WALL (08/12/2008); SIS (11/15/2007); Total abdominal hysterectomy; and TOT SLING (06/18/2008).    PFHX: Family history is unknown by patient.    PSlHX:  reports that she has never smoked. She has never used smokeless tobacco. She reports that she does not drink alcohol and does not use drugs.        Review of patient's allergies indicates:  No Known Allergies    Medication List with Changes/Refills   Current Medications    ESTRADIOL (ESTRACE) 1 MG TABLET    Take 1 tablet (1 mg total) by mouth once daily.    INFLIXIMAB (REMICADE) 100 MG INJECTION    as directed Intravenous    IPRATROPIUM (ATROVENT) 21 MCG (0.03 %) NASAL SPRAY    2 sprays by Each Nostril route 2 (two) times daily.    LEVOTHYROXINE (SYNTHROID) 100 MCG TABLET    Take 100 mcg by mouth before breakfast.    LISINOPRIL  "(PRINIVIL,ZESTRIL) 5 MG TABLET    Take 5 mg by mouth once daily.    MELOXICAM (MOBIC) 15 MG TABLET    TAKE 1 TABLET BY MOUTH EVERY DAY AS NEEDED FOR PAIN    OMEPRAZOLE (PRILOSEC) 40 MG CAPSULE    Take 40 mg by mouth 2 (two) times daily.    ROSUVASTATIN (CRESTOR) 10 MG TABLET    TAKE 1 TABLET BY MOUTH EVERY DAY WITH SELENIUM 200 MCG   Discontinued Medications    METHYLPREDNISOLONE (MEDROL DOSEPACK) 4 MG TABLET    use as directed        Objective Findings:  Vital Signs:  /71   Pulse 77   Ht 5' 8" (1.727 m)   Wt 79.7 kg (175 lb 12.8 oz)   SpO2 99%   BMI 26.73 kg/m²  Body mass index is 26.73 kg/m².    Physical Exam:  Physical Exam  Vitals and nursing note reviewed.   Constitutional:       General: She is not in acute distress.     Appearance: Normal appearance.   HENT:      Mouth/Throat:      Mouth: Mucous membranes are moist.   Cardiovascular:      Rate and Rhythm: Normal rate.   Pulmonary:      Breath sounds: No wheezing, rhonchi or rales.   Abdominal:      General: Bowel sounds are normal. There is no distension.      Palpations: Abdomen is soft. There is no mass.      Tenderness: There is no abdominal tenderness.   Skin:     General: Skin is warm and dry.      Coloration: Skin is not jaundiced or pale.   Neurological:      Mental Status: She is alert and oriented to person, place, and time.   Psychiatric:         Mood and Affect: Mood normal.          Labs:  Lab Results   Component Value Date    WBC 5.98 02/20/2024    HGB 11.4 (L) 02/20/2024    HCT 34.5 (L) 02/20/2024    MCV 91.3 02/20/2024    RDW 12.8 02/20/2024     02/20/2024    LYMPH 39.1 02/20/2024    LYMPH 2.34 02/20/2024    MONO 10.5 (H) 02/20/2024    EOS 0.24 02/20/2024    BASO 0.02 02/20/2024     Lab Results   Component Value Date     01/16/2024    K 4.5 01/16/2024     01/16/2024    CO2 33 (H) 01/16/2024     (H) 01/16/2024    BUN 7 01/16/2024    CREATININE 0.75 01/16/2024    CALCIUM 9.7 01/16/2024    PROT 7.6 01/16/2024 "    ALBUMIN 3.6 01/16/2024    BILITOT 0.2 01/16/2024    ALKPHOS 102 01/16/2024    AST 12 (L) 01/16/2024    ALT 14 01/16/2024         Imaging: No results found.      Assessment:  Isabella Kelly is a 68 y.o. female here with:  1. Ulcerative colitis with complication, unspecified location    2. Other intestinal malabsorption          Recommendations:  1. CBC, CMP, CRP, vitamin-D, vitamin B12, iron studies  2. Schedule colonoscopy with Dr. Estevez  3. Avoid NSAID's  4. Recommend Flu vaccine    Follow up in about 6 months (around 3/25/2025).      Order summary:  Orders Placed This Encounter    CBC Auto Differential    Comprehensive Metabolic Panel    C-Reactive Protein    Vitamin D    Vitamin B12 & Folate    Iron and TIBC    Ferritin    Colonoscopy       Thank you for allowing me to participate in the care of Isabella Kelly.      LORA NevilleC

## 2024-11-06 ENCOUNTER — INFUSION (OUTPATIENT)
Dept: INFUSION THERAPY | Facility: HOSPITAL | Age: 69
End: 2024-11-06
Attending: NURSE PRACTITIONER
Payer: MEDICARE

## 2024-11-06 VITALS
OXYGEN SATURATION: 97 % | WEIGHT: 175 LBS | RESPIRATION RATE: 17 BRPM | SYSTOLIC BLOOD PRESSURE: 138 MMHG | BODY MASS INDEX: 26.61 KG/M2 | DIASTOLIC BLOOD PRESSURE: 78 MMHG | HEART RATE: 61 BPM

## 2024-11-06 DIAGNOSIS — K51.019 ULCERATIVE PANCOLITIS WITH COMPLICATION: Primary | ICD-10-CM

## 2024-11-06 PROCEDURE — 96365 THER/PROPH/DIAG IV INF INIT: CPT

## 2024-11-06 PROCEDURE — 25000003 PHARM REV CODE 250: Performed by: NURSE PRACTITIONER

## 2024-11-06 PROCEDURE — 63600175 PHARM REV CODE 636 W HCPCS: Mod: JZ,JG | Performed by: NURSE PRACTITIONER

## 2024-11-06 PROCEDURE — 96366 THER/PROPH/DIAG IV INF ADDON: CPT

## 2024-11-06 RX ORDER — EPINEPHRINE 0.3 MG/.3ML
0.3 INJECTION SUBCUTANEOUS ONCE AS NEEDED
OUTPATIENT
Start: 2025-01-01

## 2024-11-06 RX ORDER — IPRATROPIUM BROMIDE AND ALBUTEROL SULFATE 2.5; .5 MG/3ML; MG/3ML
3 SOLUTION RESPIRATORY (INHALATION)
OUTPATIENT
Start: 2025-01-01

## 2024-11-06 RX ORDER — SODIUM CHLORIDE 0.9 % (FLUSH) 0.9 %
10 SYRINGE (ML) INJECTION
OUTPATIENT
Start: 2025-01-01

## 2024-11-06 RX ORDER — ACETAMINOPHEN 325 MG/1
650 TABLET ORAL
OUTPATIENT
Start: 2025-01-01

## 2024-11-06 RX ORDER — SODIUM CHLORIDE 0.9 % (FLUSH) 0.9 %
10 SYRINGE (ML) INJECTION
Status: DISCONTINUED | OUTPATIENT
Start: 2024-11-06 | End: 2024-11-06 | Stop reason: HOSPADM

## 2024-11-06 RX ORDER — DIPHENHYDRAMINE HYDROCHLORIDE 50 MG/ML
50 INJECTION INTRAMUSCULAR; INTRAVENOUS ONCE AS NEEDED
OUTPATIENT
Start: 2025-01-01

## 2024-11-06 RX ORDER — HEPARIN 100 UNIT/ML
500 SYRINGE INTRAVENOUS
OUTPATIENT
Start: 2025-01-01

## 2024-11-06 RX ADMIN — INFLIXIMAB 400 MG: 100 INJECTION, POWDER, LYOPHILIZED, FOR SOLUTION INTRAVENOUS at 08:11

## 2024-11-06 NOTE — PROGRESS NOTES
Patient arrived for Remicade infusion today ambulatory to bay. Therapy plan released and pharmacy notified.   Patients int started to left wrist   0853 infusion started  1045 infusion finished  1045Int flushed   1050Patient discharged , given AVS and told to watch for signs and symptoms of adverse reactions , if had any to go to the ER. Patient given upcoming appointment

## 2024-11-18 ENCOUNTER — OFFICE VISIT (OUTPATIENT)
Dept: FAMILY MEDICINE | Facility: CLINIC | Age: 69
End: 2024-11-18
Payer: MEDICARE

## 2024-11-18 ENCOUNTER — PATIENT MESSAGE (OUTPATIENT)
Dept: GASTROENTEROLOGY | Facility: CLINIC | Age: 69
End: 2024-11-18
Payer: MEDICARE

## 2024-11-18 VITALS
HEIGHT: 68 IN | RESPIRATION RATE: 17 BRPM | HEART RATE: 93 BPM | SYSTOLIC BLOOD PRESSURE: 124 MMHG | DIASTOLIC BLOOD PRESSURE: 80 MMHG | TEMPERATURE: 99 F | BODY MASS INDEX: 26.52 KG/M2 | OXYGEN SATURATION: 97 % | WEIGHT: 175 LBS

## 2024-11-18 DIAGNOSIS — R50.9 FEVER, UNSPECIFIED FEVER CAUSE: ICD-10-CM

## 2024-11-18 DIAGNOSIS — T88.7XXA MEDICATION SIDE EFFECTS: Primary | ICD-10-CM

## 2024-11-18 DIAGNOSIS — R50.9 FEVER, UNSPECIFIED FEVER CAUSE: Primary | ICD-10-CM

## 2024-11-18 LAB
CTP QC/QA: YES
POC MOLECULAR INFLUENZA A AGN: NEGATIVE
POC MOLECULAR INFLUENZA B AGN: NEGATIVE

## 2024-11-18 PROCEDURE — 99212 OFFICE O/P EST SF 10 MIN: CPT | Mod: ,,, | Performed by: NURSE PRACTITIONER

## 2024-11-18 PROCEDURE — 87502 INFLUENZA DNA AMP PROBE: CPT | Mod: RHCUB | Performed by: NURSE PRACTITIONER

## 2024-11-18 RX ORDER — METOPROLOL SUCCINATE 25 MG/1
25 TABLET, EXTENDED RELEASE ORAL DAILY
COMMUNITY

## 2024-11-21 ENCOUNTER — HOSPITAL ENCOUNTER (INPATIENT)
Facility: HOSPITAL | Age: 69
LOS: 1 days | Discharge: HOME | DRG: 322 | End: 2024-11-22
Attending: EMERGENCY MEDICINE
Payer: MEDICARE

## 2024-11-21 DIAGNOSIS — I21.4 NSTEMI (NON-ST ELEVATION MYOCARDIAL INFARCTION): ICD-10-CM

## 2024-11-21 DIAGNOSIS — I21.4 NON-ST ELEVATION MYOCARDIAL INFARCTION (NSTEMI): ICD-10-CM

## 2024-11-21 DIAGNOSIS — D64.9 ANEMIA, UNSPECIFIED TYPE: Primary | ICD-10-CM

## 2024-11-21 DIAGNOSIS — I21.4 NSTEMI (NON-ST ELEVATED MYOCARDIAL INFARCTION): Primary | ICD-10-CM

## 2024-11-21 DIAGNOSIS — I10 PRIMARY HYPERTENSION: ICD-10-CM

## 2024-11-21 DIAGNOSIS — R00.0 TACHYCARDIA: ICD-10-CM

## 2024-11-21 DIAGNOSIS — R07.9 CHEST PAIN: ICD-10-CM

## 2024-11-21 LAB
ALBUMIN SERPL BCP-MCNC: 3.2 G/DL (ref 3.4–4.8)
ALBUMIN/GLOB SERPL: 0.7 {RATIO}
ALP SERPL-CCNC: 115 U/L (ref 40–150)
ALT SERPL W P-5'-P-CCNC: 35 U/L
ANION GAP SERPL CALCULATED.3IONS-SCNC: 14 MMOL/L (ref 7–16)
AST SERPL W P-5'-P-CCNC: 41 U/L (ref 5–34)
BASOPHILS # BLD AUTO: 0.02 K/UL (ref 0–0.2)
BASOPHILS NFR BLD AUTO: 0.2 % (ref 0–1)
BILIRUB SERPL-MCNC: 0.6 MG/DL
BUN SERPL-MCNC: 15 MG/DL (ref 10–20)
BUN/CREAT SERPL: 18 (ref 6–20)
CALCIUM SERPL-MCNC: 8.8 MG/DL (ref 8.4–10.2)
CHLORIDE SERPL-SCNC: 98 MMOL/L (ref 98–107)
CO2 SERPL-SCNC: 23 MMOL/L (ref 23–31)
CREAT SERPL-MCNC: 0.85 MG/DL (ref 0.55–1.02)
DIFFERENTIAL METHOD BLD: ABNORMAL
EGFR (NO RACE VARIABLE) (RUSH/TITUS): 75 ML/MIN/1.73M2
EOSINOPHIL # BLD AUTO: 0.08 K/UL (ref 0–0.5)
EOSINOPHIL NFR BLD AUTO: 0.8 % (ref 1–4)
ERYTHROCYTE [DISTWIDTH] IN BLOOD BY AUTOMATED COUNT: 11.9 % (ref 11.5–14.5)
GLOBULIN SER-MCNC: 4.4 G/DL (ref 2–4)
GLUCOSE SERPL-MCNC: 119 MG/DL (ref 82–115)
HCT VFR BLD AUTO: 33.6 % (ref 38–47)
HGB BLD-MCNC: 11.1 G/DL (ref 12–16)
IMM GRANULOCYTES # BLD AUTO: 0.02 K/UL (ref 0–0.04)
IMM GRANULOCYTES NFR BLD: 0.2 % (ref 0–0.4)
LYMPHOCYTES # BLD AUTO: 2.23 K/UL (ref 1–4.8)
LYMPHOCYTES NFR BLD AUTO: 22.8 % (ref 27–41)
MAGNESIUM SERPL-MCNC: 1.8 MG/DL (ref 1.6–2.6)
MCH RBC QN AUTO: 30.5 PG (ref 27–31)
MCHC RBC AUTO-ENTMCNC: 33 G/DL (ref 32–36)
MCV RBC AUTO: 92.3 FL (ref 80–96)
MONOCYTES # BLD AUTO: 1.05 K/UL (ref 0–0.8)
MONOCYTES NFR BLD AUTO: 10.8 % (ref 2–6)
MPC BLD CALC-MCNC: 10.5 FL (ref 9.4–12.4)
NEUTROPHILS # BLD AUTO: 6.36 K/UL (ref 1.8–7.7)
NEUTROPHILS NFR BLD AUTO: 65.2 % (ref 53–65)
NRBC # BLD AUTO: 0 X10E3/UL
NRBC, AUTO (.00): 0 %
NT-PROBNP SERPL-MCNC: 6208 PG/ML (ref 1–125)
PLATELET # BLD AUTO: 401 K/UL (ref 150–400)
POTASSIUM SERPL-SCNC: 4.2 MMOL/L (ref 3.5–5.1)
PROT SERPL-MCNC: 7.6 G/DL (ref 5.8–7.6)
RBC # BLD AUTO: 3.64 M/UL (ref 4.2–5.4)
SARS-COV-2 RDRP RESP QL NAA+PROBE: NEGATIVE
SODIUM SERPL-SCNC: 131 MMOL/L (ref 136–145)
TROPONIN I SERPL HS-MCNC: 128.7 NG/L
TROPONIN I SERPL HS-MCNC: 66.2 NG/L
TROPONIN I SERPL HS-MCNC: 83.7 NG/L
TSH SERPL DL<=0.005 MIU/L-ACNC: 1.95 UIU/ML (ref 0.35–4.94)
WBC # BLD AUTO: 9.76 K/UL (ref 4.5–11)

## 2024-11-21 PROCEDURE — 83880 ASSAY OF NATRIURETIC PEPTIDE: CPT | Performed by: EMERGENCY MEDICINE

## 2024-11-21 PROCEDURE — G0378 HOSPITAL OBSERVATION PER HR: HCPCS

## 2024-11-21 PROCEDURE — 36415 COLL VENOUS BLD VENIPUNCTURE: CPT | Performed by: EMERGENCY MEDICINE

## 2024-11-21 PROCEDURE — 36415 COLL VENOUS BLD VENIPUNCTURE: CPT | Performed by: FAMILY MEDICINE

## 2024-11-21 PROCEDURE — 36415 COLL VENOUS BLD VENIPUNCTURE: CPT

## 2024-11-21 PROCEDURE — 99285 EMERGENCY DEPT VISIT HI MDM: CPT | Mod: 25

## 2024-11-21 PROCEDURE — 80053 COMPREHEN METABOLIC PANEL: CPT | Performed by: EMERGENCY MEDICINE

## 2024-11-21 PROCEDURE — 87635 SARS-COV-2 COVID-19 AMP PRB: CPT

## 2024-11-21 PROCEDURE — 84443 ASSAY THYROID STIM HORMONE: CPT

## 2024-11-21 PROCEDURE — 83735 ASSAY OF MAGNESIUM: CPT | Performed by: EMERGENCY MEDICINE

## 2024-11-21 PROCEDURE — 85025 COMPLETE CBC W/AUTO DIFF WBC: CPT | Performed by: EMERGENCY MEDICINE

## 2024-11-21 PROCEDURE — 93005 ELECTROCARDIOGRAM TRACING: CPT

## 2024-11-21 PROCEDURE — 25000003 PHARM REV CODE 250: Performed by: EMERGENCY MEDICINE

## 2024-11-21 PROCEDURE — 96360 HYDRATION IV INFUSION INIT: CPT

## 2024-11-21 PROCEDURE — 84484 ASSAY OF TROPONIN QUANT: CPT | Mod: 91

## 2024-11-21 PROCEDURE — 84484 ASSAY OF TROPONIN QUANT: CPT | Performed by: EMERGENCY MEDICINE

## 2024-11-21 PROCEDURE — 84484 ASSAY OF TROPONIN QUANT: CPT | Performed by: FAMILY MEDICINE

## 2024-11-21 RX ORDER — NAPROXEN SODIUM 220 MG/1
324 TABLET, FILM COATED ORAL ONCE
Status: COMPLETED | OUTPATIENT
Start: 2024-11-22 | End: 2024-11-22

## 2024-11-21 RX ORDER — IBUPROFEN 200 MG
16 TABLET ORAL
Status: DISCONTINUED | OUTPATIENT
Start: 2024-11-22 | End: 2024-11-22 | Stop reason: HOSPADM

## 2024-11-21 RX ORDER — PANTOPRAZOLE SODIUM 40 MG/1
40 TABLET, DELAYED RELEASE ORAL DAILY
Status: DISCONTINUED | OUTPATIENT
Start: 2024-11-22 | End: 2024-11-22 | Stop reason: HOSPADM

## 2024-11-21 RX ORDER — CLOPIDOGREL BISULFATE 300 MG/1
600 TABLET, FILM COATED ORAL ONCE
Status: DISCONTINUED | OUTPATIENT
Start: 2024-11-22 | End: 2024-11-21

## 2024-11-21 RX ORDER — LISINOPRIL 5 MG/1
5 TABLET ORAL DAILY
Status: DISCONTINUED | OUTPATIENT
Start: 2024-11-22 | End: 2024-11-22 | Stop reason: HOSPADM

## 2024-11-21 RX ORDER — METOPROLOL SUCCINATE 25 MG/1
25 TABLET, EXTENDED RELEASE ORAL DAILY
Status: DISCONTINUED | OUTPATIENT
Start: 2024-11-22 | End: 2024-11-22 | Stop reason: HOSPADM

## 2024-11-21 RX ORDER — POLYETHYLENE GLYCOL 3350 17 G/17G
17 POWDER, FOR SOLUTION ORAL 2 TIMES DAILY PRN
Status: DISCONTINUED | OUTPATIENT
Start: 2024-11-22 | End: 2024-11-22 | Stop reason: HOSPADM

## 2024-11-21 RX ORDER — IBUPROFEN 200 MG
24 TABLET ORAL
Status: DISCONTINUED | OUTPATIENT
Start: 2024-11-22 | End: 2024-11-22 | Stop reason: HOSPADM

## 2024-11-21 RX ORDER — NALOXONE HCL 0.4 MG/ML
0.02 VIAL (ML) INJECTION
Status: DISCONTINUED | OUTPATIENT
Start: 2024-11-22 | End: 2024-11-22 | Stop reason: HOSPADM

## 2024-11-21 RX ORDER — ONDANSETRON HYDROCHLORIDE 2 MG/ML
8 INJECTION, SOLUTION INTRAVENOUS EVERY 8 HOURS PRN
Status: DISCONTINUED | OUTPATIENT
Start: 2024-11-22 | End: 2024-11-22 | Stop reason: HOSPADM

## 2024-11-21 RX ORDER — ENOXAPARIN SODIUM 100 MG/ML
1 INJECTION SUBCUTANEOUS EVERY 12 HOURS
Status: DISCONTINUED | OUTPATIENT
Start: 2024-11-22 | End: 2024-11-22

## 2024-11-21 RX ORDER — SODIUM CHLORIDE 0.9 % (FLUSH) 0.9 %
10 SYRINGE (ML) INJECTION EVERY 12 HOURS PRN
Status: DISCONTINUED | OUTPATIENT
Start: 2024-11-22 | End: 2024-11-22 | Stop reason: HOSPADM

## 2024-11-21 RX ORDER — ATORVASTATIN CALCIUM 80 MG/1
80 TABLET, FILM COATED ORAL DAILY
Status: DISCONTINUED | OUTPATIENT
Start: 2024-11-22 | End: 2024-11-22 | Stop reason: HOSPADM

## 2024-11-21 RX ORDER — ACETAMINOPHEN 325 MG/1
650 TABLET ORAL EVERY 4 HOURS PRN
Status: DISCONTINUED | OUTPATIENT
Start: 2024-11-22 | End: 2024-11-22 | Stop reason: HOSPADM

## 2024-11-21 RX ORDER — NITROGLYCERIN 0.4 MG/1
0.4 TABLET SUBLINGUAL EVERY 5 MIN PRN
Status: DISCONTINUED | OUTPATIENT
Start: 2024-11-22 | End: 2024-11-22 | Stop reason: HOSPADM

## 2024-11-21 RX ORDER — NAPROXEN SODIUM 220 MG/1
81 TABLET, FILM COATED ORAL DAILY
Status: DISCONTINUED | OUTPATIENT
Start: 2024-11-22 | End: 2024-11-22 | Stop reason: HOSPADM

## 2024-11-21 RX ORDER — GLUCAGON 1 MG
1 KIT INJECTION
Status: DISCONTINUED | OUTPATIENT
Start: 2024-11-22 | End: 2024-11-22 | Stop reason: HOSPADM

## 2024-11-21 RX ORDER — LEVOTHYROXINE SODIUM 100 UG/1
100 TABLET ORAL
Status: DISCONTINUED | OUTPATIENT
Start: 2024-11-22 | End: 2024-11-22 | Stop reason: HOSPADM

## 2024-11-21 RX ADMIN — SODIUM CHLORIDE 1000 ML: 9 INJECTION, SOLUTION INTRAVENOUS at 05:11

## 2024-11-21 NOTE — ED TRIAGE NOTES
Isabella Kelly, a 69 y.o. female presents to [facility] via POV with a chief complaint of TACHYCARDIA. HAS DONE 30-35OZ LIQUID IV TODAY       Triage Note:  Chief Complaint   Patient presents with    Palpitations     PRESENTS TO ER WITH COMPLAINT OF TACHYCARDIA HR OF 130S ALL DAY      Edy Villalpando DO  Review of patient's allergies indicates:  No Known Allergies  Past Medical History:   Diagnosis Date    Acquired hypothyroidism 1/13/2023    Bacterial vaginosis     Cystocele with rectocele 11/28/2007    1st degree cystocele; 2nd degree rectocele    Cystocele, midline 12/12/2007    midline    Depressive disorder 09/2004    mild    Dysmenorrhea 2006    severe first day and getting worse    Esophageal reflux     Granulation tissue 08/12/2008    5 - 8 mm long lower post repair granulation tissue - treated with sliver nitrate cautery    Hypercholesterolemia 09/20/2004    cholesterol is 219 mg/dl    Hyperlipidemia 10/26/2011    mile;  TG  71 mg/dl;  HDL  67 mg/dl;  LDL  136 mg/dl;  total cholesterol  217 mg/dl.  10/13/2017:  LDL  163 mg/dl;  total cholesterol  260 mg/dl; triglycerides  186 mg/dl;  HDL  66 mg/dl    Hypertension     Hypertriglyceridemia 10/04/2010    216 mg/dl;  [id number]    Hypertriglyceridemia 10/04/2010    216  mg/dll  02/01/2013 - recent kevek us 238 mg/dl    IBD (inflammatory bowel disease)     Iron deficiency anemia due to chronic blood loss 12/19/2022    Large uterus     Menorrhagia     Non-healing wound of left lower extremity 12/12/2022    Polymenorrhea     PONV (postoperative nausea and vomiting)     Post-hysterectomy menopause     Rectocele     Stress incontinence     Ulcerative colitis 08/2020    Ulcerative colitis confirmed by colonoscopy by Dr. Ho Capellan    Uterine prolapse     Vaginal discharge      Past Surgical History:   Procedure Laterality Date    anterior/posterior colporrhaphy with vaginal enterocele repair  06/18/2008    APPENDECTOMY      COLONOSCOPY       DILATION AND CURETTAGE OF UTERUS      [id number]    ENDOMETRIAL BIOPSY  02/16/2006    ENDOMETRIAL BIOPSY REPEAT   11/06/2007    HYSTEROSCOPY  11/28/2007    SILVER NITRATE CAUTERY OF VAGINAL WALL  08/12/2008    SIS  11/15/2007    TOT SLING  06/18/2008    TOTAL ABDOMINAL HYSTERECTOMY       Social History     Tobacco Use    Smoking status: Never    Smokeless tobacco: Never   Substance Use Topics    Alcohol use: Never    Drug use: Never     Past Surgical History:   Procedure Laterality Date    anterior/posterior colporrhaphy with vaginal enterocele repair  06/18/2008    APPENDECTOMY      COLONOSCOPY      DILATION AND CURETTAGE OF UTERUS      [id number]    ENDOMETRIAL BIOPSY  02/16/2006    ENDOMETRIAL BIOPSY REPEAT   11/06/2007    HYSTEROSCOPY  11/28/2007    SILVER NITRATE CAUTERY OF VAGINAL WALL  08/12/2008    SIS  11/15/2007    TOT SLING  06/18/2008    TOTAL ABDOMINAL HYSTERECTOMY       Vitals:    11/21/24 1648   BP: (!) 90/59   Pulse: (!) 128   Resp:    Temp: 98.4 °F (36.9 °C)     Current Facility-Administered Medications   Medication Dose Route Frequency Provider Last Rate Last Admin    diphenhydrAMINE injection 25 mg  25 mg Intravenous Once Erma Drummond FNP        diphenhydrAMINE injection 25 mg  25 mg Intravenous Once Erma Drummond FNP        diphenhydrAMINE injection 25 mg  25 mg Intravenous Once Erma Drummond FNP         Current Outpatient Medications   Medication Sig Dispense Refill    estradioL (ESTRACE) 1 MG tablet Take 1 tablet (1 mg total) by mouth once daily. 90 tablet 3    inFLIXimab (REMICADE) 100 mg injection as directed Intravenous      levothyroxine (SYNTHROID) 100 MCG tablet Take 100 mcg by mouth before breakfast.      lisinopriL (PRINIVIL,ZESTRIL) 5 MG tablet Take 5 mg by mouth once daily.      meloxicam (MOBIC) 15 MG tablet TAKE 1 TABLET BY MOUTH EVERY DAY AS NEEDED FOR PAIN (Patient not taking: Reported on 11/18/2024) 30 tablet 2    metoprolol succinate (TOPROL-XL) 25 MG 24 hr tablet  Take 25 mg by mouth once daily.      omeprazole (PRILOSEC) 40 MG capsule Take 40 mg by mouth 2 (two) times daily.      rosuvastatin (CRESTOR) 10 MG tablet TAKE 1 TABLET BY MOUTH EVERY DAY WITH SELENIUM 200 MCG

## 2024-11-21 NOTE — ED PROVIDER NOTES
Tania Date: 11/21/2024    SCRIBE #1 NOTE: I, Jessica Weaver, am scribing for, and in the presence of,  Obinna Rao MD.       History     Chief Complaint   Patient presents with    Palpitations     PRESENTS TO ER WITH COMPLAINT OF TACHYCARDIA HR OF 130S ALL DAY      This 69 y.o. Female pt presents to the ED with c/o palpitation. The pt reports having come to the hospital yesterday to she Dr. Nair and had blood test completed. The pt also reports getting the result of having protein in her Urine. She states feels fine but when she walks or move she get SOB. Pt said she had fever for 4 days and that's why she when tot he hospital yesterday. Pt has Mhx of Acquired hypothyroidism, and Iron deficiency anemia due to chronic blood loss. When examined pt had very fast heart beat.     The history is provided by the patient and the spouse. No  was used.     Review of patient's allergies indicates:  No Known Allergies  Past Medical History:   Diagnosis Date    Acquired hypothyroidism 1/13/2023    Bacterial vaginosis     CAD S/P percutaneous coronary angioplasty 11/22/2024    Cystocele with rectocele 11/28/2007    1st degree cystocele; 2nd degree rectocele    Cystocele, midline 12/12/2007    midline    Depressive disorder 09/2004    mild    Dysmenorrhea 2006    severe first day and getting worse    Esophageal reflux     Granulation tissue 08/12/2008    5 - 8 mm long lower post repair granulation tissue - treated with sliver nitrate cautery    Hypercholesterolemia 09/20/2004    cholesterol is 219 mg/dl    Hyperlipidemia 10/26/2011    mile;  TG  71 mg/dl;  HDL  67 mg/dl;  LDL  136 mg/dl;  total cholesterol  217 mg/dl.  10/13/2017:  LDL  163 mg/dl;  total cholesterol  260 mg/dl; triglycerides  186 mg/dl;  HDL  66 mg/dl    Hypertension     Hypertriglyceridemia 10/04/2010    216 mg/dl;  [id number]    Hypertriglyceridemia 10/04/2010    216  mg/dll  02/01/2013 - recent kevek us 238 mg/dl    IBD  (inflammatory bowel disease)     Iron deficiency anemia due to chronic blood loss 12/19/2022    Large uterus     Menorrhagia     Non-healing wound of left lower extremity 12/12/2022    Polymenorrhea     PONV (postoperative nausea and vomiting)     Post-hysterectomy menopause     Rectocele     Stress incontinence     Ulcerative colitis 08/2020    Ulcerative colitis confirmed by colonoscopy by Dr. Ho Capellan    Uterine prolapse     Vaginal discharge      Past Surgical History:   Procedure Laterality Date    ANGIOGRAM, CORONARY, WITH LEFT HEART CATHETERIZATION N/A 11/22/2024    Procedure: Angiogram, Coronary, with Left Heart Cath;  Surgeon: Raphael Chaves DO;  Location: [facility] CATH LAB;  Service: Cardiology;  Laterality: N/A;    anterior/posterior colporrhaphy with vaginal enterocele repair  06/18/2008    APPENDECTOMY      COLONOSCOPY      DILATION AND CURETTAGE OF UTERUS      [id number]    ENDOMETRIAL BIOPSY  02/16/2006    ENDOMETRIAL BIOPSY REPEAT   11/06/2007    HYSTEROSCOPY  11/28/2007    PERCUTANEOUS CORONARY INTERVENTION, ARTERY N/A 11/22/2024    Procedure: Percutaneous coronary intervention;  Surgeon: Raphael Chaves DO;  Location: [facility] CATH LAB;  Service: Cardiology;  Laterality: N/A;    SILVER NITRATE CAUTERY OF VAGINAL WALL  08/12/2008    SIS  11/15/2007    TOT SLING  06/18/2008    TOTAL ABDOMINAL HYSTERECTOMY       Family History   Family history unknown: Yes     Social History     Tobacco Use    Smoking status: Never    Smokeless tobacco: Never   Substance Use Topics    Alcohol use: Never    Drug use: Never     Review of Systems   Constitutional: Negative.  Negative for fever.   HENT: Negative.  Negative for sore throat.    Eyes: Negative.    Respiratory: Negative.  Negative for shortness of breath.    Cardiovascular: Negative.  Negative for chest pain.   Gastrointestinal: Negative.  Negative for nausea.   Endocrine: Negative.    Genitourinary: Negative.  Negative for dysuria.    Musculoskeletal: Negative.  Negative for back pain.   Skin: Negative.  Negative for rash.   Allergic/Immunologic: Negative.    Neurological: Negative.  Negative for weakness.   Hematological: Negative.  Does not bruise/bleed easily.   Psychiatric/Behavioral: Negative.     All other systems reviewed and are negative.      Physical Exam     Initial Vitals   BP Pulse Resp Temp SpO2   11/21/24 1648 11/21/24 1648 11/21/24 1647 11/21/24 1648 11/21/24 1648   (!) 90/59 (!) 128 16 98.4 °F (36.9 °C) 96 %      MAP       --                Physical Exam    ED Course   Procedures  Labs Reviewed   NT-PRO NATRIURETIC PEPTIDE - Abnormal       Result Value    ProBNP 6,208 (*)    COMPREHENSIVE METABOLIC PANEL - Abnormal    Sodium 131 (*)     Potassium 4.2      Chloride 98      CO2 23      Anion Gap 14      Glucose 119 (*)     BUN 15      Creatinine 0.85      BUN/Creatinine Ratio 18      Calcium 8.8      Total Protein 7.6      Albumin 3.2 (*)     Globulin 4.4 (*)     A/G Ratio 0.7      Bilirubin, Total 0.6      Alk Phos 115      ALT 35      AST 41 (*)     eGFR 75     TROPONIN I - Abnormal    Troponin I High Sensitivity 66.2 (*)    CBC WITH DIFFERENTIAL - Abnormal    WBC 9.76      RBC 3.64 (*)     Hemoglobin 11.1 (*)     Hematocrit 33.6 (*)     MCV 92.3      MCH 30.5      MCHC 33.0      RDW 11.9      Platelet Count 401 (*)     MPV 10.5      Neutrophils % 65.2 (*)     Lymphocytes % 22.8 (*)     Monocytes % 10.8 (*)     Eosinophils % 0.8 (*)     Basophils % 0.2      Immature Granulocytes % 0.2      nRBC, Auto 0.0      Neutrophils, Abs 6.36      Lymphocytes, Absolute 2.23      Monocytes, Absolute 1.05 (*)     Eosinophils, Absolute 0.08      Basophils, Absolute 0.02      Immature Granulocytes, Absolute 0.02      nRBC, Absolute 0.00      Diff Type Auto     TROPONIN I - Abnormal    Troponin I High Sensitivity 83.7 (*)    MAGNESIUM - Normal    Magnesium 1.8     SARS-COV-2 RNA AMPLIFICATION, QUAL - Normal    SARS COV-2 Molecular Negative       Narrative:     Negative SARS-CoV results should not be used as the sole basis for treatment or patient management decisions; negative results should be considered in the context of a patient's recent exposures, history and the presene of clinical signs and symptoms consistent with COVID-19.  Negative results should be treated as presumptive and confirmed by molecular assay, if necessary for patient management.   CBC W/ AUTO DIFFERENTIAL    Narrative:     The following orders were created for panel order CBC auto differential.  Procedure                               Abnormality         Status                     ---------                               -----------         ------                     CBC with Differential[[id number]]       Abnormal            Final result                 Please view results for these tests on the individual orders.     EKG Readings: (Independently Interpreted)   Heart Rate: 88 bpm.   Normal sinus rhythm  Cannot rule out Anterior infarct, age undetermined  Abnormal ECG  NO Previous ECGs available  Confirmed By Roxanne MOREL, [facility] ([id number]) on 4/6/2022 5:40:20 PM       Imaging Results              X-Ray Chest AP Portable (Final result)  Result time 11/22/24 07:06:33      Final result by Abdelrahman Brownlee MD (11/22/24 07:06:33)                   Impression:      No acute chest disease.      Electronically signed by: Abdelrahman Brownlee  Date:    11/22/2024  Time:    07:06               Narrative:    EXAMINATION:  XR CHEST AP PORTABLE    CLINICAL HISTORY:  chest pain;    TECHNIQUE:  Portable view of the chest was performed.    COMPARISON:  05/13/2020.    FINDINGS:  Mild chronic interstitial change.  Minimal dependent atelectasis at the medial right lung base.  No significant pleural effusion.  Heart size is mildly enlarged.  Bony thorax intact.                                       Medications   sodium chloride 0.9% bolus 1,000 mL 1,000 mL (0 mLs Intravenous Stopped 11/21/24 4485)   aspirin  chewable tablet 324 mg (324 mg Oral Given 11/22/24 0008)     Medical Decision Making  Pt has palpitations and SOB upon movement. Pt has Dr. Nair as GI, and was here yesterday for test done which was dx with protein in her Urine.    Amount and/or Complexity of Data Reviewed  Labs: ordered.              Attending Attestation:           Physician Attestation for Scribe:  Physician Attestation Statement for Scribe #1: I, Obinna Rao MD, reviewed documentation, as scribed by Jessica Weaver in my presence, and it is both accurate and complete.             ED Course as of 11/25/24 0150   Thu Nov 21, 2024 2029 Troponin I(!!) [WB]      ED Course User Index  [WB] Domenic Morris DO               Medical Decision Making:   Initial Assessment:   This 69 y.o. Female pt presents to the ED with c/o palpitation. The pt reports having come to the hospital yesterday to she Dr. Nair and had blood test completed. The pt also reports getting the result of having protein in her Urine. She states feels fine but when she walks or move she get SOB. Pt said she had fever for 4 days and that's why she when tot he hospital yesterday. Pt has Mhx of Acquired hypothyroidism, and Iron deficiency anemia due to chronic blood loss. When examined pt had very fast heart beat.     The history is provided by the patient and the spouse. No  was used.     Differential Diagnosis:   Tachycardia--will admit to hospital observation             Clinical Impression:  Final diagnoses:  [R00.0] Tachycardia          ED Disposition Condition    Observation                 Domenic Morris DO  11/25/24 0150

## 2024-11-22 VITALS
TEMPERATURE: 99 F | WEIGHT: 172.63 LBS | OXYGEN SATURATION: 94 % | SYSTOLIC BLOOD PRESSURE: 120 MMHG | DIASTOLIC BLOOD PRESSURE: 72 MMHG | BODY MASS INDEX: 26.16 KG/M2 | HEART RATE: 90 BPM | HEIGHT: 68 IN | RESPIRATION RATE: 18 BRPM

## 2024-11-22 PROBLEM — Z98.61 CAD S/P PERCUTANEOUS CORONARY ANGIOPLASTY: Status: ACTIVE | Noted: 2024-11-22

## 2024-11-22 PROBLEM — I25.10 CAD S/P PERCUTANEOUS CORONARY ANGIOPLASTY: Status: ACTIVE | Noted: 2024-11-22

## 2024-11-22 PROBLEM — R00.0 SINUS TACHYCARDIA: Status: ACTIVE | Noted: 2024-11-22

## 2024-11-22 PROBLEM — I21.4 NSTEMI (NON-ST ELEVATED MYOCARDIAL INFARCTION): Status: ACTIVE | Noted: 2024-11-22

## 2024-11-22 LAB
ALBUMIN SERPL BCP-MCNC: 2.9 G/DL (ref 3.4–4.8)
ALBUMIN/GLOB SERPL: 0.8 {RATIO}
ALP SERPL-CCNC: 102 U/L (ref 40–150)
ALT SERPL W P-5'-P-CCNC: 34 U/L
ANION GAP SERPL CALCULATED.3IONS-SCNC: 14 MMOL/L (ref 7–16)
AORTIC ROOT ANNULUS: 3.08 CM
AORTIC VALVE CUSP SEPERATION: 2.43 CM
APICAL FOUR CHAMBER EJECTION FRACTION: 82 %
APTT PPP: 52.7 SECONDS (ref 25.2–37.3)
AST SERPL W P-5'-P-CCNC: 43 U/L (ref 5–34)
AV INDEX (PROSTH): 0.71
AV MEAN GRADIENT: 7.5 MMHG
AV PEAK GRADIENT: 14.4 MMHG
AV VALVE AREA BY VELOCITY RATIO: 2.8 CM²
AV VALVE AREA: 2.9 CM²
AV VELOCITY RATIO: 0.68
BASOPHILS # BLD AUTO: 0.01 K/UL (ref 0–0.2)
BASOPHILS NFR BLD AUTO: 0.1 % (ref 0–1)
BILIRUB SERPL-MCNC: 0.6 MG/DL
BSA FOR ECHO PROCEDURE: 1.94 M2
BUN SERPL-MCNC: 12 MG/DL (ref 10–20)
BUN/CREAT SERPL: 17 (ref 6–20)
CALCIUM SERPL-MCNC: 8.3 MG/DL (ref 8.4–10.2)
CATH EF QUANTITATIVE: 65 %
CHLORIDE SERPL-SCNC: 100 MMOL/L (ref 98–107)
CHOLEST SERPL-MCNC: 145 MG/DL
CHOLEST/HDLC SERPL: 3.7 {RATIO}
CO2 SERPL-SCNC: 22 MMOL/L (ref 23–31)
CREAT SERPL-MCNC: 0.72 MG/DL (ref 0.55–1.02)
CV ECHO LV RWT: 0.49 CM
DIFFERENTIAL METHOD BLD: ABNORMAL
DOP CALC AO PEAK VEL: 1.9 M/S
DOP CALC AO VTI: 35.9 CM
DOP CALC LVOT AREA: 4.2 CM2
DOP CALC LVOT DIAMETER: 2.3 CM
DOP CALC LVOT PEAK VEL: 1.3 M/S
DOP CALC LVOT STROKE VOLUME: 105.5 CM3
DOP CALCLVOT PEAK VEL VTI: 25.4 CM
E WAVE DECELERATION TIME: 179.27 MSEC
E/A RATIO: 0.9
E/E' RATIO: 8.19 M/S
ECHO LV POSTERIOR WALL: 1 CM (ref 0.6–1.1)
EGFR (NO RACE VARIABLE) (RUSH/TITUS): 91 ML/MIN/1.73M2
EOSINOPHIL # BLD AUTO: 0.02 K/UL (ref 0–0.5)
EOSINOPHIL NFR BLD AUTO: 0.2 % (ref 1–4)
ERYTHROCYTE [DISTWIDTH] IN BLOOD BY AUTOMATED COUNT: 11.9 % (ref 11.5–14.5)
FRACTIONAL SHORTENING: 43.9 % (ref 28–44)
GLOBULIN SER-MCNC: 3.8 G/DL (ref 2–4)
GLUCOSE SERPL-MCNC: 104 MG/DL (ref 82–115)
HCT VFR BLD AUTO: 28.3 % (ref 38–47)
HDLC SERPL-MCNC: 39 MG/DL (ref 35–60)
HGB BLD-MCNC: 9.3 G/DL (ref 12–16)
IMM GRANULOCYTES # BLD AUTO: 0.04 K/UL (ref 0–0.04)
IMM GRANULOCYTES NFR BLD: 0.4 % (ref 0–0.4)
INR BLD: 1.38
INTERVENTRICULAR SEPTUM: 1.1 CM (ref 0.6–1.1)
IVC DIAMETER: 1.56 CM
LDLC SERPL CALC-MCNC: 88 MG/DL
LDLC/HDLC SERPL: 2.3 {RATIO}
LEFT ATRIUM AREA SYSTOLIC (APICAL 4 CHAMBER): 10.53 CM2
LEFT ATRIUM SIZE: 3.39 CM
LEFT INTERNAL DIMENSION IN SYSTOLE: 2.3 CM (ref 2.1–4)
LEFT VENTRICLE DIASTOLIC VOLUME INDEX: 37.49 ML/M2
LEFT VENTRICLE DIASTOLIC VOLUME: 71.99 ML
LEFT VENTRICLE END DIASTOLIC VOLUME APICAL 4 CHAMBER: 63.15 ML
LEFT VENTRICLE END SYSTOLIC VOLUME APICAL 4 CHAMBER: 20.29 ML
LEFT VENTRICLE MASS INDEX: 73.7 G/M2
LEFT VENTRICLE SYSTOLIC VOLUME INDEX: 9.5 ML/M2
LEFT VENTRICLE SYSTOLIC VOLUME: 18.26 ML
LEFT VENTRICULAR INTERNAL DIMENSION IN DIASTOLE: 4.1 CM (ref 3.5–6)
LEFT VENTRICULAR MASS: 141.5 G
LV LATERAL E/E' RATIO: 6.62 M/S
LV SEPTAL E/E' RATIO: 10.75 M/S
LVED V (TEICH): 71.99 ML
LVES V (TEICH): 18.26 ML
LVOT MG: 5.44 MMHG
LVOT MV: 1.15 CM/S
LYMPHOCYTES # BLD AUTO: 1.2 K/UL (ref 1–4.8)
LYMPHOCYTES NFR BLD AUTO: 11.7 % (ref 27–41)
MAGNESIUM SERPL-MCNC: 1.7 MG/DL (ref 1.6–2.6)
MCH RBC QN AUTO: 30.3 PG (ref 27–31)
MCHC RBC AUTO-ENTMCNC: 32.9 G/DL (ref 32–36)
MCV RBC AUTO: 92.2 FL (ref 80–96)
MONOCYTES # BLD AUTO: 0.86 K/UL (ref 0–0.8)
MONOCYTES NFR BLD AUTO: 8.4 % (ref 2–6)
MPC BLD CALC-MCNC: 10.3 FL (ref 9.4–12.4)
MV PEAK A VEL: 0.96 M/S
MV PEAK E VEL: 0.86 M/S
MV STENOSIS PRESSURE HALF TIME: 51.99 MS
MV VALVE AREA P 1/2 METHOD: 4.23 CM2
NEUTROPHILS # BLD AUTO: 8.13 K/UL (ref 1.8–7.7)
NEUTROPHILS NFR BLD AUTO: 79.2 % (ref 53–65)
NONHDLC SERPL-MCNC: 106 MG/DL
NRBC # BLD AUTO: 0 X10E3/UL
NRBC, AUTO (.00): 0 %
OHS CV RV/LV RATIO: 0.88 CM
OHS LV EJECTION FRACTION SIMPSONS BIPLANE MOD: 60 %
PISA TR MAX VEL: 2.22 M/S
PLATELET # BLD AUTO: 349 K/UL (ref 150–400)
POTASSIUM SERPL-SCNC: 3.6 MMOL/L (ref 3.5–5.1)
PROT SERPL-MCNC: 6.7 G/DL (ref 5.8–7.6)
PROTHROMBIN TIME: 16.8 SECONDS (ref 11.7–14.7)
PV PEAK GRADIENT: 5 MMHG
PV PEAK VELOCITY: 1.12 M/S
RA MAJOR: 3.96 CM
RA PRESSURE ESTIMATED: 3 MMHG
RBC # BLD AUTO: 3.07 M/UL (ref 4.2–5.4)
RIGHT VENTRICLE DIASTOLIC BASEL DIMENSION: 3.6 CM
RIGHT VENTRICLE DIASTOLIC LENGTH: 5.3 CM
RIGHT VENTRICLE DIASTOLIC MID DIMENSION: 3.3 CM
RIGHT VENTRICULAR LENGTH IN DIASTOLE (APICAL 4-CHAMBER VIEW): 5.28 CM
RV MID DIAMA: 3.29 CM
RV TB RVSP: 5 MMHG
SODIUM SERPL-SCNC: 132 MMOL/L (ref 136–145)
TDI LATERAL: 0.13 M/S
TDI SEPTAL: 0.08 M/S
TDI: 0.11 M/S
TR MAX PG: 20 MMHG
TRICUSPID ANNULAR PLANE SYSTOLIC EXCURSION: 2.88 CM
TRIGL SERPL-MCNC: 92 MG/DL (ref 37–140)
TV REST PULMONARY ARTERY PRESSURE: 23 MMHG
VLDLC SERPL-MCNC: 18 MG/DL
WBC # BLD AUTO: 10.26 K/UL (ref 4.5–11)
Z-SCORE OF LEFT VENTRICULAR DIMENSION IN END DIASTOLE: -2.79
Z-SCORE OF LEFT VENTRICULAR DIMENSION IN END SYSTOLE: -2.94

## 2024-11-22 PROCEDURE — G0378 HOSPITAL OBSERVATION PER HR: HCPCS

## 2024-11-22 PROCEDURE — 96372 THER/PROPH/DIAG INJ SC/IM: CPT

## 2024-11-22 PROCEDURE — 93458 L HRT ARTERY/VENTRICLE ANGIO: CPT | Mod: XU | Performed by: INTERNAL MEDICINE

## 2024-11-22 PROCEDURE — 93571 IV DOP VEL&/PRESS C FLO 1ST: CPT | Mod: 26,52,, | Performed by: INTERNAL MEDICINE

## 2024-11-22 PROCEDURE — 027034Z DILATION OF CORONARY ARTERY, ONE ARTERY WITH DRUG-ELUTING INTRALUMINAL DEVICE, PERCUTANEOUS APPROACH: ICD-10-PCS | Performed by: INTERNAL MEDICINE

## 2024-11-22 PROCEDURE — C1874 STENT, COATED/COV W/DEL SYS: HCPCS | Performed by: INTERNAL MEDICINE

## 2024-11-22 PROCEDURE — 25000242 PHARM REV CODE 250 ALT 637 W/ HCPCS

## 2024-11-22 PROCEDURE — C1760 CLOSURE DEV, VASC: HCPCS | Performed by: INTERNAL MEDICINE

## 2024-11-22 PROCEDURE — 93799 UNLISTED CV SVC/PROCEDURE: CPT | Performed by: INTERNAL MEDICINE

## 2024-11-22 PROCEDURE — 25000003 PHARM REV CODE 250: Performed by: INTERNAL MEDICINE

## 2024-11-22 PROCEDURE — 63600175 PHARM REV CODE 636 W HCPCS

## 2024-11-22 PROCEDURE — 85610 PROTHROMBIN TIME: CPT

## 2024-11-22 PROCEDURE — 25500020 PHARM REV CODE 255: Performed by: INTERNAL MEDICINE

## 2024-11-22 PROCEDURE — 36415 COLL VENOUS BLD VENIPUNCTURE: CPT

## 2024-11-22 PROCEDURE — 80053 COMPREHEN METABOLIC PANEL: CPT

## 2024-11-22 PROCEDURE — 27201423 OPTIME MED/SURG SUP & DEVICES STERILE SUPPLY: Performed by: INTERNAL MEDICINE

## 2024-11-22 PROCEDURE — 99152 MOD SED SAME PHYS/QHP 5/>YRS: CPT | Performed by: INTERNAL MEDICINE

## 2024-11-22 PROCEDURE — C1887 CATHETER, GUIDING: HCPCS | Performed by: INTERNAL MEDICINE

## 2024-11-22 PROCEDURE — 99223 1ST HOSP IP/OBS HIGH 75: CPT | Mod: ,,, | Performed by: INTERNAL MEDICINE

## 2024-11-22 PROCEDURE — 99152 MOD SED SAME PHYS/QHP 5/>YRS: CPT | Mod: ,,, | Performed by: INTERNAL MEDICINE

## 2024-11-22 PROCEDURE — 99153 MOD SED SAME PHYS/QHP EA: CPT | Performed by: INTERNAL MEDICINE

## 2024-11-22 PROCEDURE — 80061 LIPID PANEL: CPT

## 2024-11-22 PROCEDURE — B2151ZZ FLUOROSCOPY OF LEFT HEART USING LOW OSMOLAR CONTRAST: ICD-10-PCS | Performed by: INTERNAL MEDICINE

## 2024-11-22 PROCEDURE — 99239 HOSP IP/OBS DSCHRG MGMT >30: CPT | Mod: ,,,

## 2024-11-22 PROCEDURE — 83735 ASSAY OF MAGNESIUM: CPT

## 2024-11-22 PROCEDURE — C9600 PERC DRUG-EL COR STENT SING: HCPCS | Mod: LD | Performed by: INTERNAL MEDICINE

## 2024-11-22 PROCEDURE — B2111ZZ FLUOROSCOPY OF MULTIPLE CORONARY ARTERIES USING LOW OSMOLAR CONTRAST: ICD-10-PCS | Performed by: INTERNAL MEDICINE

## 2024-11-22 PROCEDURE — 85730 THROMBOPLASTIN TIME PARTIAL: CPT

## 2024-11-22 PROCEDURE — 92928 PRQ TCAT PLMT NTRAC ST 1 LES: CPT | Mod: LD,,, | Performed by: INTERNAL MEDICINE

## 2024-11-22 PROCEDURE — 4A023N7 MEASUREMENT OF CARDIAC SAMPLING AND PRESSURE, LEFT HEART, PERCUTANEOUS APPROACH: ICD-10-PCS | Performed by: INTERNAL MEDICINE

## 2024-11-22 PROCEDURE — 93010 ELECTROCARDIOGRAM REPORT: CPT | Mod: ,,, | Performed by: INTERNAL MEDICINE

## 2024-11-22 PROCEDURE — C1769 GUIDE WIRE: HCPCS | Performed by: INTERNAL MEDICINE

## 2024-11-22 PROCEDURE — 93458 L HRT ARTERY/VENTRICLE ANGIO: CPT | Mod: 26,51,XU, | Performed by: INTERNAL MEDICINE

## 2024-11-22 PROCEDURE — 93005 ELECTROCARDIOGRAM TRACING: CPT

## 2024-11-22 PROCEDURE — C1894 INTRO/SHEATH, NON-LASER: HCPCS | Performed by: INTERNAL MEDICINE

## 2024-11-22 PROCEDURE — 25000003 PHARM REV CODE 250

## 2024-11-22 PROCEDURE — 85025 COMPLETE CBC W/AUTO DIFF WBC: CPT

## 2024-11-22 PROCEDURE — 11000001 HC ACUTE MED/SURG PRIVATE ROOM

## 2024-11-22 PROCEDURE — 63600175 PHARM REV CODE 636 W HCPCS: Performed by: INTERNAL MEDICINE

## 2024-11-22 DEVICE — KIT ANGIO SEAL 6FR VIP: Type: IMPLANTABLE DEVICE | Site: CORONARY | Status: FUNCTIONAL

## 2024-11-22 DEVICE — EVEROLIMUS-ELUTING PLATINUM CHROMIUM CORONARY STENT SYSTEM
Type: IMPLANTABLE DEVICE | Site: CORONARY | Status: FUNCTIONAL
Brand: SYNERGY™ XD

## 2024-11-22 RX ORDER — FENTANYL CITRATE 50 UG/ML
INJECTION, SOLUTION INTRAMUSCULAR; INTRAVENOUS
Status: DISCONTINUED | OUTPATIENT
Start: 2024-11-22 | End: 2024-11-22 | Stop reason: HOSPADM

## 2024-11-22 RX ORDER — MIDAZOLAM HYDROCHLORIDE 1 MG/ML
INJECTION INTRAMUSCULAR; INTRAVENOUS
Status: DISCONTINUED | OUTPATIENT
Start: 2024-11-22 | End: 2024-11-22 | Stop reason: HOSPADM

## 2024-11-22 RX ORDER — SODIUM CHLORIDE 9 MG/ML
INJECTION, SOLUTION INTRAVENOUS
Status: DISCONTINUED | OUTPATIENT
Start: 2024-11-22 | End: 2024-11-22 | Stop reason: HOSPADM

## 2024-11-22 RX ORDER — NITROGLYCERIN 0.4 MG/1
0.4 TABLET SUBLINGUAL EVERY 5 MIN PRN
Qty: 25 TABLET | Refills: 4 | Status: SHIPPED | OUTPATIENT
Start: 2024-11-23 | End: 2025-11-23

## 2024-11-22 RX ORDER — IOPAMIDOL 755 MG/ML
INJECTION, SOLUTION INTRAVASCULAR
Status: DISCONTINUED | OUTPATIENT
Start: 2024-11-22 | End: 2024-11-22 | Stop reason: HOSPADM

## 2024-11-22 RX ORDER — HEPARIN SODIUM 1000 [USP'U]/ML
INJECTION, SOLUTION INTRAVENOUS; SUBCUTANEOUS
Status: DISCONTINUED | OUTPATIENT
Start: 2024-11-22 | End: 2024-11-22 | Stop reason: HOSPADM

## 2024-11-22 RX ORDER — LISINOPRIL 5 MG/1
5 TABLET ORAL DAILY
Qty: 30 TABLET | Refills: 2 | Status: SHIPPED | OUTPATIENT
Start: 2024-11-22 | End: 2025-11-22

## 2024-11-22 RX ORDER — NAPROXEN SODIUM 220 MG/1
81 TABLET, FILM COATED ORAL DAILY
Qty: 30 TABLET | Refills: 11 | Status: SHIPPED | OUTPATIENT
Start: 2024-11-23 | End: 2025-11-23

## 2024-11-22 RX ORDER — ATORVASTATIN CALCIUM 80 MG/1
80 TABLET, FILM COATED ORAL DAILY
Qty: 30 TABLET | Refills: 2 | Status: SHIPPED | OUTPATIENT
Start: 2024-11-23 | End: 2025-11-23

## 2024-11-22 RX ORDER — LIDOCAINE HYDROCHLORIDE 10 MG/ML
INJECTION, SOLUTION INFILTRATION; PERINEURAL
Status: DISCONTINUED | OUTPATIENT
Start: 2024-11-22 | End: 2024-11-22 | Stop reason: HOSPADM

## 2024-11-22 RX ADMIN — LEVOTHYROXINE SODIUM 100 MCG: 100 TABLET ORAL at 05:11

## 2024-11-22 RX ADMIN — METOPROLOL SUCCINATE 25 MG: 25 TABLET, EXTENDED RELEASE ORAL at 09:11

## 2024-11-22 RX ADMIN — ASPIRIN 81 MG CHEWABLE TABLET 324 MG: 81 TABLET CHEWABLE at 12:11

## 2024-11-22 RX ADMIN — LISINOPRIL 5 MG: 5 TABLET ORAL at 09:11

## 2024-11-22 RX ADMIN — ASPIRIN 81 MG CHEWABLE TABLET 81 MG: 81 TABLET CHEWABLE at 09:11

## 2024-11-22 RX ADMIN — NITROGLYCERIN 0.4 MG: 0.4 TABLET SUBLINGUAL at 12:11

## 2024-11-22 RX ADMIN — PANTOPRAZOLE SODIUM 40 MG: 40 TABLET, DELAYED RELEASE ORAL at 09:11

## 2024-11-22 RX ADMIN — ATORVASTATIN CALCIUM 80 MG: 80 TABLET, FILM COATED ORAL at 09:11

## 2024-11-22 RX ADMIN — ENOXAPARIN SODIUM 80 MG: 80 INJECTION SUBCUTANEOUS at 12:11

## 2024-11-22 NOTE — HPI
70 y/o female with PMH of  HLD, HTN, hypothyroidism, ulcerative colitis, IBS, GERD who presents to [facility] with complaints of sob, chest pain that started earlier today. She reports the pain occurred on exertion. Patient also reported her heart rate felt like it was beating fast during that time as well. Patient sees Dr. Riley currently and reports undergoing an echo a month ago that was normal. She denies any CAD or LHC. Patient reports she takes metoprolol to help control her heart rate as she states it sometimes races for a few minutes.      Initial presenting vitals , BP 90/59, Spo2 96%, Temp 98.4, patient received 1 L NS bolus and HR returned to 85 in the ED. Lab work was significant for elevated troponin's with rising trend of 66.2 83.7, 128.7, proBNP 6208, WBC 9.76, H/H 11.1/33.6, Platelets 401, Na 131, K 4.2, Cl 98, Co2 23, BUN/Cr 15/0.85, Glu 119, Calcium 8.8, mag 1.8, TSH 1.952, UA unremarkable EKG showed  sinus tachycardia.     Cardiology consulted for NSTEMI.  Troponin 66, 83, 128.  EKG sinus rhythm with no acute ischemic changes.  Echo normal with EF 60%. Patient reports palpitations and increased dyspnea over the past few days but denies any chest pain until an episode last night while here in the hospital. States she thought she was having some indigestion, was given 2 SL nitro and chest pain completely resolved.

## 2024-11-22 NOTE — PLAN OF CARE
[facility] - [facility] Telemetry  Initial Discharge Assessment       Primary Care Provider: Edy Villalpando DO    Admission Diagnosis: Tachycardia [R00.0]    Admission Date: 11/21/2024  Expected Discharge Date:     Transition of Care Barriers: None    Payor: MEDICARE / Plan: MEDICARE PART A & B / Product Type: Government /     Extended Emergency Contact Information  Primary Emergency Contact: Castillo Kelly  Mobile Phone: [phone]  Relation: Spouse  Preferred language: English   needed? No    Discharge Plan A: Home with family  Discharge Plan B: Home with family, Home Health, Long-term acute care facility (LTAC), Rehab, Skilled Nursing Facility      [id number] - [place], [place] - [address]  [address] [address]  Phone: [phone] Fax: [phone]     [facility] [place] [address]  [address]  [facility] [address]  Phone: [phone] Fax: [phone]      Initial Assessment (most recent)       Adult Discharge Assessment - 11/22/24 1031          Discharge Assessment    Assessment Type Discharge Planning Assessment     Confirmed/corrected address, phone number and insurance Yes     Confirmed Demographics Correct on Facesheet     Source of Information patient     People in Home spouse     Do you expect to return to your current living situation? Yes     Do you have help at home or someone to help you manage your care at home? Yes     Who are your caregiver(s) and their phone number(s)? Castillo Kelly, , [phone]     Prior to hospitilization cognitive status: Unable to Assess     Current cognitive status: Alert/Oriented     Walking or Climbing Stairs Difficulty no     Dressing/Bathing Difficulty no     Home Accessibility not wheelchair accessible;stairs to enter home     Number of Stairs, Main Entrance two     Home Layout Able to live on 1st floor     Equipment Currently Used at  Home none     Readmission within 30 days? No     Patient currently being followed by outpatient case management? No     Do you currently have service(s) that help you manage your care at home? No     Do you take prescription medications? Yes     Do you have prescription coverage? Yes     Coverage Medicare     Do you have any problems affording any of your prescribed medications? No     Is the patient taking medications as prescribed? yes     Who is going to help you get home at discharge? Castillo Kelly, , [phone]     How do you get to doctors appointments? car, drives self;family or friend will provide     Are you on dialysis? No     Do you take coumadin? No     Discharge Plan A Home with family     Discharge Plan B Home with family;Home Health;Long-term acute care facility (LTAC);Rehab;Skilled Nursing Facility     DME Needed Upon Discharge  none     Discharge Plan discussed with: Patient;Spouse/sig other     Name(s) and Number(s) Castillo Kelly, , [phone]     Transition of Care Barriers None        OTHER    Name(s) of People in Home Castillo Kelly, , [phone]                   SS spoke with pt and family at bedside. Pt lives at home with her  and plans to return to current living arrangements when medically ready for dc. Pt does not currently require dme and is not current with hh. SDOH completed November 2024. IM obtained. SS following for anticipated dc needs

## 2024-11-22 NOTE — ASSESSMENT & PLAN NOTE
Patients blood pressure range in the last 24 hours was: BP  Min: 90/59  Max: 127/70.The patient's inpatient anti-hypertensive regimen is listed below:  Current Antihypertensives  lisinopriL tablet 5 mg, Daily, Oral  metoprolol succinate (TOPROL-XL) 24 hr tablet 25 mg, Daily, Oral  nitroGLYCERIN SL tablet 0.4 mg, Every 5 min PRN, Sublingual    Plan  - BP is controlled, no changes needed to their regimen

## 2024-11-22 NOTE — ASSESSMENT & PLAN NOTE
Patients blood pressure range in the last 24 hours was: BP  Min: 90/59  Max: 115/71.The patient's inpatient anti-hypertensive regimen is listed below:  Current Antihypertensives  lisinopriL tablet 5 mg, Daily, Oral  metoprolol succinate (TOPROL-XL) 24 hr tablet 25 mg, Daily, Oral  nitroGLYCERIN SL tablet 0.4 mg, Every 5 min PRN, Sublingual    Plan  - BP is controlled, no changes needed to their regimen  -

## 2024-11-22 NOTE — PROGRESS NOTES
[facility] Tele[facility]  Wound Care    Patient Name:  Isabella Kelly   MRN:  [id number]  Date: 11/22/2024  Diagnosis: NSTEMI (non-ST elevated myocardial infarction)    History:     Past Medical History:   Diagnosis Date    Acquired hypothyroidism 1/13/2023    Bacterial vaginosis     Cystocele with rectocele 11/28/2007    1st degree cystocele; 2nd degree rectocele    Cystocele, midline 12/12/2007    midline    Depressive disorder 09/2004    mild    Dysmenorrhea 2006    severe first day and getting worse    Esophageal reflux     Granulation tissue 08/12/2008    5 - 8 mm long lower post repair granulation tissue - treated with sliver nitrate cautery    Hypercholesterolemia 09/20/2004    cholesterol is 219 mg/dl    Hyperlipidemia 10/26/2011    mile;  TG  71 mg/dl;  HDL  67 mg/dl;  LDL  136 mg/dl;  total cholesterol  217 mg/dl.  10/13/2017:  LDL  163 mg/dl;  total cholesterol  260 mg/dl; triglycerides  186 mg/dl;  HDL  66 mg/dl    Hypertension     Hypertriglyceridemia 10/04/2010    216 mg/dl;  [id number]    Hypertriglyceridemia 10/04/2010    216  mg/dll  02/01/2013 - recent kevek us 238 mg/dl    IBD (inflammatory bowel disease)     Iron deficiency anemia due to chronic blood loss 12/19/2022    Large uterus     Menorrhagia     Non-healing wound of left lower extremity 12/12/2022    Polymenorrhea     PONV (postoperative nausea and vomiting)     Post-hysterectomy menopause     Rectocele     Stress incontinence     Ulcerative colitis 08/2020    Ulcerative colitis confirmed by colonoscopy by Dr. Ho Capellan    Uterine prolapse     Vaginal discharge        Social History     Socioeconomic History    Marital status:    Tobacco Use    Smoking status: Never    Smokeless tobacco: Never   Substance and Sexual Activity    Alcohol use: Never    Drug use: Never    Sexual activity: Yes     Partners: Male     Birth control/protection: See Surgical Hx     Social Drivers of Health     Financial  Resource Strain: Low Risk  (11/21/2024)    Overall Financial Resource Strain (CARDIA)     Difficulty of Paying Living Expenses: Not very hard   Food Insecurity: No Food Insecurity (11/21/2024)    Hunger Vital Sign     Worried About Running Out of Food in the Last Year: Never true     Ran Out of Food in the Last Year: Never true   Transportation Needs: No Transportation Needs (11/21/2024)    TRANSPORTATION NEEDS     Transportation : No   Physical Activity: Insufficiently Active (5/30/2024)    Exercise Vital Sign     Days of Exercise per Week: 3 days     Minutes of Exercise per Session: 20 min   Stress: No Stress Concern Present (11/21/2024)    [employer] - Occupational Stress Questionnaire     Feeling of Stress : Not at all   Housing Stability: Low Risk  (11/21/2024)    Housing Stability Vital Sign     Unable to Pay for Housing in the Last Year: No     Homeless in the Last Year: No       Precautions:     Allergies as of 11/21/2024    (No Known Allergies)       WO Assessment Details/Treatment     Narrative: Seen patient for initiation of preventative skin care measures    Patient  in bed, Alert. States skin is ok.   Lamberto score 23    Consult wound care for any skin issues        11/22/2024

## 2024-11-22 NOTE — ASSESSMENT & PLAN NOTE
- Patient seen and evaluated by Dr. Chaves  - Troponin 66, 83, 128; EKG SR with no acute ischemic changes (ST, rate 129 on admission); Echo normal with EF 60%  - BNP 6K  - Ischemia vs Type 2 MI secondary to tachycardia.   - [facility] today. Procedure, risk, and benefits discussed in detail with patient. She verbalized understanding and wishes to proceed. Consent obtained and on chart.   - Further recommendations to follow.

## 2024-11-22 NOTE — SUBJECTIVE & OBJECTIVE
Past Medical History:   Diagnosis Date    Acquired hypothyroidism 1/13/2023    Bacterial vaginosis     CAD S/P percutaneous coronary angioplasty 11/22/2024    Cystocele with rectocele 11/28/2007    1st degree cystocele; 2nd degree rectocele    Cystocele, midline 12/12/2007    midline    Depressive disorder 09/2004    mild    Dysmenorrhea 2006    severe first day and getting worse    Esophageal reflux     Granulation tissue 08/12/2008    5 - 8 mm long lower post repair granulation tissue - treated with sliver nitrate cautery    Hypercholesterolemia 09/20/2004    cholesterol is 219 mg/dl    Hyperlipidemia 10/26/2011    mile;  TG  71 mg/dl;  HDL  67 mg/dl;  LDL  136 mg/dl;  total cholesterol  217 mg/dl.  10/13/2017:  LDL  163 mg/dl;  total cholesterol  260 mg/dl; triglycerides  186 mg/dl;  HDL  66 mg/dl    Hypertension     Hypertriglyceridemia 10/04/2010    216 mg/dl;  [id number]    Hypertriglyceridemia 10/04/2010    216  mg/dll  02/01/2013 - recent kevek us 238 mg/dl    IBD (inflammatory bowel disease)     Iron deficiency anemia due to chronic blood loss 12/19/2022    Large uterus     Menorrhagia     Non-healing wound of left lower extremity 12/12/2022    Polymenorrhea     PONV (postoperative nausea and vomiting)     Post-hysterectomy menopause     Rectocele     Stress incontinence     Ulcerative colitis 08/2020    Ulcerative colitis confirmed by colonoscopy by Dr. Ho Capellan    Uterine prolapse     Vaginal discharge        Past Surgical History:   Procedure Laterality Date    ANGIOGRAM, CORONARY, WITH LEFT HEART CATHETERIZATION N/A 11/22/2024    Procedure: Angiogram, Coronary, with Left Heart Cath;  Surgeon: Raphael Chaves DO;  Location: [facility] CATH LAB;  Service: Cardiology;  Laterality: N/A;    anterior/posterior colporrhaphy with vaginal enterocele repair  06/18/2008    APPENDECTOMY      COLONOSCOPY      DILATION AND CURETTAGE OF UTERUS      [id number]    ENDOMETRIAL BIOPSY  02/16/2006    ENDOMETRIAL BIOPSY  REPEAT   11/06/2007    HYSTEROSCOPY  11/28/2007    PERCUTANEOUS CORONARY INTERVENTION, ARTERY N/A 11/22/2024    Procedure: Percutaneous coronary intervention;  Surgeon: Raphael Chaves DO;  Location: [facility] CATH LAB;  Service: Cardiology;  Laterality: N/A;    SILVER NITRATE CAUTERY OF VAGINAL WALL  08/12/2008    SIS  11/15/2007    TOT SLING  06/18/2008    TOTAL ABDOMINAL HYSTERECTOMY         Review of patient's allergies indicates:  No Known Allergies    No current facility-administered medications on file prior to encounter.     Current Outpatient Medications on File Prior to Encounter   Medication Sig    estradioL (ESTRACE) 1 MG tablet Take 1 tablet (1 mg total) by mouth once daily.    levothyroxine (SYNTHROID) 100 MCG tablet Take 100 mcg by mouth before breakfast.    metoprolol succinate (TOPROL-XL) 25 MG 24 hr tablet Take 25 mg by mouth once daily.    omeprazole (PRILOSEC) 40 MG capsule Take 40 mg by mouth 2 (two) times daily.    [DISCONTINUED] lisinopriL (PRINIVIL,ZESTRIL) 5 MG tablet Take 5 mg by mouth once daily.    [DISCONTINUED] rosuvastatin (CRESTOR) 10 MG tablet Take 10 mg by mouth every other day.    inFLIXimab (REMICADE) 100 mg injection as directed Intravenous     Family History    Family history is unknown by patient.       Tobacco Use    Smoking status: Never    Smokeless tobacco: Never   Substance and Sexual Activity    Alcohol use: Never    Drug use: Never    Sexual activity: Yes     Partners: Male     Birth control/protection: See Surgical Hx     Review of Systems   All other systems reviewed and are negative.    Objective:     Vital Signs (Most Recent):  Temp: 98.8 °F (37.1 °C) (11/22/24 1556)  Pulse: 97 (11/22/24 1556)  Resp: 18 (11/22/24 1556)  BP: 123/74 (11/22/24 1502)  SpO2: 95 % (11/22/24 1556) Vital Signs (24h Range):  Temp:  [98.1 °F (36.7 °C)-99.9 °F (37.7 °C)] 98.8 °F (37.1 °C)  Pulse:  [] 97  Resp:  [16-20] 18  SpO2:  [92 %-99 %] 95 %  BP: ()/(57-80) 123/74     Weight:  78.3 kg (172 lb 9.6 oz)  Body mass index is 26.24 kg/m².     Physical Exam  Vitals and nursing note reviewed.   Constitutional:       Appearance: Normal appearance.   HENT:      Head: Normocephalic.      Right Ear: External ear normal.      Left Ear: External ear normal.      Nose: Nose normal.      Mouth/Throat:      Pharynx: Oropharynx is clear.   Eyes:      Conjunctiva/sclera: Conjunctivae normal.   Cardiovascular:      Rate and Rhythm: Normal rate and regular rhythm.      Pulses: Normal pulses.      Heart sounds: Normal heart sounds.   Pulmonary:      Effort: Pulmonary effort is normal.      Breath sounds: Normal breath sounds.   Abdominal:      General: Abdomen is flat.      Palpations: Abdomen is soft.   Musculoskeletal:      Right lower leg: No edema.      Left lower leg: No edema.   Skin:     General: Skin is warm.   Neurological:      Mental Status: She is alert and oriented to person, place, and time.   Psychiatric:         Mood and Affect: Mood normal.         Behavior: Behavior normal.         Thought Content: Thought content normal.         Judgment: Judgment normal.                Significant Labs: All pertinent labs within the past 24 hours have been reviewed.    Significant Imaging: I have reviewed all pertinent imaging results/findings within the past 24 hours.

## 2024-11-22 NOTE — H&P
[facility] - [address] [place] [address] Medicine  History & Physical    Patient Name: Isabella Kelly  MRN: [id number]  Patient Class: OP- Observation  Admission Date: 11/21/2024  Attending Physician: Hugo Badillo MD   Primary Care Provider: Edy Villalpando DO         Patient information was obtained from past medical records and ER records.     Subjective:     Principal Problem:NSTEMI (non-ST elevated myocardial infarction)    Chief Complaint:   Chief Complaint   Patient presents with    Palpitations     PRESENTS TO ER WITH COMPLAINT OF TACHYCARDIA HR OF 130S ALL DAY         HPI: Patient is a 70 yo F who presents to [facility] with complaints of sob, chest pain that started earlier today. She reports the pain occurred on exertion. Patient also reported her heart rate felt like it was beating fast during that time as well. Patient sees Dr. Riley currently and reports undergoing an echo a month ago that was normal. She denies any CAD or LHC. Patient reports she takes metoprolol to help control her heart rate as she states it sometimes races for a few minutes.     Initial presenting vitals , BP 90/59, Spo2 96%, Temp 98.4, patient received 1 L NS bolus and HR returned to 85 in the ED. Lab work was significant for elevated troponin's with rising trend of 66.2 83.7, 128.7, proBNP 6208, WBC 9.76, H/H 11.1/33.6, Platelets 401, Na 131, K 4.2, Cl 98, Co2 23, BUN/Cr 15/0.85, Glu 119, Calcium 8.8, mag 1.8, TSH 1.952, UA unremarkable EKG showed  sinus tachycardia.     This patient will be placed in observation at [facility] for continued medical management.     Past Medical History:   Diagnosis Date    Acquired hypothyroidism 1/13/2023    Bacterial vaginosis     Cystocele with rectocele 11/28/2007    1st degree cystocele; 2nd degree rectocele    Cystocele, midline 12/12/2007    midline    Depressive disorder 09/2004    mild    Dysmenorrhea 2006    severe first day  and getting worse    Esophageal reflux     Granulation tissue 08/12/2008    5 - 8 mm long lower post repair granulation tissue - treated with sliver nitrate cautery    Hypercholesterolemia 09/20/2004    cholesterol is 219 mg/dl    Hyperlipidemia 10/26/2011    mile;  TG  71 mg/dl;  HDL  67 mg/dl;  LDL  136 mg/dl;  total cholesterol  217 mg/dl.  10/13/2017:  LDL  163 mg/dl;  total cholesterol  260 mg/dl; triglycerides  186 mg/dl;  HDL  66 mg/dl    Hypertension     Hypertriglyceridemia 10/04/2010    216 mg/dl;  [id number]    Hypertriglyceridemia 10/04/2010    216  mg/dll  02/01/2013 - recent kevek us 238 mg/dl    IBD (inflammatory bowel disease)     Iron deficiency anemia due to chronic blood loss 12/19/2022    Large uterus     Menorrhagia     Non-healing wound of left lower extremity 12/12/2022    Polymenorrhea     PONV (postoperative nausea and vomiting)     Post-hysterectomy menopause     Rectocele     Stress incontinence     Ulcerative colitis 08/2020    Ulcerative colitis confirmed by colonoscopy by Dr. Ho Capellan    Uterine prolapse     Vaginal discharge        Past Surgical History:   Procedure Laterality Date    anterior/posterior colporrhaphy with vaginal enterocele repair  06/18/2008    APPENDECTOMY      COLONOSCOPY      DILATION AND CURETTAGE OF UTERUS      [id number]    ENDOMETRIAL BIOPSY  02/16/2006    ENDOMETRIAL BIOPSY REPEAT   11/06/2007    HYSTEROSCOPY  11/28/2007    SILVER NITRATE CAUTERY OF VAGINAL WALL  08/12/2008    SIS  11/15/2007    TOT SLING  06/18/2008    TOTAL ABDOMINAL HYSTERECTOMY         Review of patient's allergies indicates:  No Known Allergies    No current facility-administered medications on file prior to encounter.     Current Outpatient Medications on File Prior to Encounter   Medication Sig    estradioL (ESTRACE) 1 MG tablet Take 1 tablet (1 mg total) by mouth once daily.    levothyroxine (SYNTHROID) 100 MCG tablet Take 100 mcg by mouth before breakfast.    lisinopriL  (PRINIVIL,ZESTRIL) 5 MG tablet Take 5 mg by mouth once daily.    metoprolol succinate (TOPROL-XL) 25 MG 24 hr tablet Take 25 mg by mouth once daily.    omeprazole (PRILOSEC) 40 MG capsule Take 40 mg by mouth 2 (two) times daily.    rosuvastatin (CRESTOR) 10 MG tablet Take 10 mg by mouth every other day.    inFLIXimab (REMICADE) 100 mg injection as directed Intravenous    [DISCONTINUED] meloxicam (MOBIC) 15 MG tablet TAKE 1 TABLET BY MOUTH EVERY DAY AS NEEDED FOR PAIN (Patient not taking: Reported on 11/18/2024)     Family History    Family history is unknown by patient.       Tobacco Use    Smoking status: Never    Smokeless tobacco: Never   Substance and Sexual Activity    Alcohol use: Never    Drug use: Never    Sexual activity: Yes     Partners: Male     Birth control/protection: See Surgical Hx     Review of Systems   Constitutional:  Positive for fatigue. Negative for chills and fever.   Respiratory:  Positive for cough and shortness of breath.    Cardiovascular:  Positive for chest pain and palpitations. Negative for leg swelling.   Gastrointestinal:  Negative for abdominal pain, anal bleeding, blood in stool, constipation, diarrhea, nausea and vomiting.   Neurological:  Negative for weakness and numbness.     Objective:     Vital Signs (Most Recent):  Temp: 98.5 °F (36.9 °C) (11/21/24 2300)  Pulse: 74 (11/21/24 2300)  Resp: 20 (11/21/24 2300)  BP: 108/67 (11/21/24 2300)  SpO2: 99 % (11/21/24 2300) Vital Signs (24h Range):  Temp:  [98.4 °F (36.9 °C)-98.5 °F (36.9 °C)] 98.5 °F (36.9 °C)  Pulse:  [] 74  Resp:  [16-20] 20  SpO2:  [96 %-99 %] 99 %  BP: ()/(57-71) 108/67     Weight: 78.3 kg (172 lb 9.6 oz)  Body mass index is 26.24 kg/m².     Physical Exam  Vitals and nursing note reviewed.   Constitutional:       Appearance: Normal appearance.   HENT:      Head: Normocephalic.      Right Ear: External ear normal.      Left Ear: External ear normal.      Nose: Nose normal.      Mouth/Throat:       "Pharynx: Oropharynx is clear.   Eyes:      Conjunctiva/sclera: Conjunctivae normal.   Cardiovascular:      Rate and Rhythm: Normal rate and regular rhythm.      Pulses: Normal pulses.      Heart sounds: Normal heart sounds.   Pulmonary:      Effort: Pulmonary effort is normal.      Breath sounds: Normal breath sounds.   Abdominal:      General: Abdomen is flat.      Palpations: Abdomen is soft.   Musculoskeletal:      Right lower leg: No edema.      Left lower leg: No edema.   Skin:     General: Skin is warm.   Neurological:      Mental Status: She is alert and oriented to person, place, and time.   Psychiatric:         Mood and Affect: Mood normal.         Behavior: Behavior normal.         Thought Content: Thought content normal.         Judgment: Judgment normal.                Significant Labs: All pertinent labs within the past 24 hours have been reviewed.    Significant Imaging: I have reviewed all pertinent imaging results/findings within the past 24 hours.  Assessment/Plan:     * NSTEMI (non-ST elevated myocardial infarction)  Patient presents with NSTEMI. Chest pain is currently controlled. BASIL score is 3. Patient is currently on NSTEMI Pathway.    EKG reviewed. Troponins reviewed and results noted-   No results for input(s): "TROPONINI", "TROPONINIHS" in the last 168 hours..   Troponin trended 66.2 to 83.7 to 128.7,     Lipid panel pending-     No results found for: "LDLCALC"  No results found for: "TRIG"      Medical management includes; Beta Blocker, Anticoagulation, High Intensity Stain, and ACE/ARB Echo has been performed. Latest ECHO results are as follows- Results for orders placed during the hospital encounter of 03/30/22  Echo pending  Echo    Interpretation Summary  · The left ventricle is normal in size with mild predominantly basal septal mild asymmetric hypertrophy eccentric hypertrophy and normal systolic function.  · The estimated ejection fraction is 60%.  · Normal left ventricular " diastolic function.  · Normal right ventricular size with normal right ventricular systolic function.  · Mild tricuspid regurgitation.  .   Consult for cardiac rehab is not ordered. Cardiology is consulted. Continue to monitor patient closely and adjust therapy as needed.        Sinus tachycardia  Resolved after fluid in the ED, will continue patients home metoprolol. TSH WNL Will continue to monitor and adjust accordingly.     Primary hypertension  Patients blood pressure range in the last 24 hours was: BP  Min: 90/59  Max: 115/71.The patient's inpatient anti-hypertensive regimen is listed below:  Current Antihypertensives  lisinopriL tablet 5 mg, Daily, Oral  metoprolol succinate (TOPROL-XL) 24 hr tablet 25 mg, Daily, Oral  nitroGLYCERIN SL tablet 0.4 mg, Every 5 min PRN, Sublingual    Plan  - BP is controlled, no changes needed to their regimen  -     Hypothyroidism  Continue home synthroid, TSH WNL.         VTE Risk Mitigation (From admission, onward)           Ordered     enoxaparin injection 80 mg  Every 12 hours         11/21/24 2347     IP VTE LOW RISK PATIENT  Once         11/21/24 2347     Place sequential compression device  Until discontinued         11/21/24 2347     Place KARLY hose  Until discontinued         11/21/24 2347                           On 11/22/2024, patient should be placed in hospital observation services under my care in collaboration with Dr. Badillo.         Chavez Shukla DO  Department of Hospital Medicine  [facility] - [facility] Telemetry

## 2024-11-22 NOTE — NURSING
Patient back in room from heart cath. No signs of acute distress noted. VSS. Dressing C/D/I. No complaints voiced at this time.

## 2024-11-22 NOTE — ASSESSMENT & PLAN NOTE
Patient with known CAD s/p stent placement, mid LAD  Discharged with Brilinta, metoprolol, asa, lisinopril, statin  Cardiac rehab referrals placed  Follow up with Cardio as scheduled

## 2024-11-22 NOTE — CONSULTS
[facility] - [address] [place], [place] [address]etry  Cardiology  Consult Note    Patient Name: Isabella Kelly  MRN: [id number]  Admission Date: 11/21/2024  Hospital Length of Stay: 0 days  Code Status: Full Code   Attending Provider: Neelima Montenegro MD   Consulting Provider: SAUL Griggs  Primary Care Physician: Edy Villalpando DO  Principal Problem:NSTEMI (non-ST elevated myocardial infarction)    Patient information was obtained from patient, past medical records, ER records, and primary team.     Inpatient consult to Cardiology  Consult performed by: Felecia Gutirerez FNP  Consult ordered by: Chavez Shukla DO  Reason for consult: NSTEMI  Assessment/Recommendations: PT seen and examined, chart reviewed, essentially agree with findings as documented.    CC: shortness of breath, chest pain, palpitations    HPI: Pt complains of midsternal and left sided chest pain, started at rest, pressure sensation, 8/10 at most severe, associated with mild shortness of breath, lasted approximately five minutes before she informed nursing, received one sublingual ntg, with improvement in chest pain to 4/10, resolved with second sublingual ntg.    PMH: reviewed  PE: agree with above  Labs, Xrays, EKGs reviewed    IMP/Plan;  1. Chest pain, concerning for anginal etiology with elevating troponins, consistent with ACS, recommend LHC/poss, discussed risks and benefits with patient and  at bedside, elect to proceed, will plan on LHC at next available.         Subjective:     Chief Complaint:  sob and palpitations     HPI:   70 y/o female with PMH of  HLD, HTN, hypothyroidism, ulcerative colitis, IBS, GERD who presents to [facility] with complaints of sob, chest pain that started earlier today. She reports the pain occurred on exertion. Patient also reported her heart rate felt like it was beating fast during that time as well. Patient sees Dr. Riley currently and reports undergoing an echo a  month ago that was normal. She denies any CAD or LHC. Patient reports she takes metoprolol to help control her heart rate as she states it sometimes races for a few minutes.      Initial presenting vitals , BP 90/59, Spo2 96%, Temp 98.4, patient received 1 L NS bolus and HR returned to 85 in the ED. Lab work was significant for elevated troponin's with rising trend of 66.2 83.7, 128.7, proBNP 6208, WBC 9.76, H/H 11.1/33.6, Platelets 401, Na 131, K 4.2, Cl 98, Co2 23, BUN/Cr 15/0.85, Glu 119, Calcium 8.8, mag 1.8, TSH 1.952, UA unremarkable EKG showed  sinus tachycardia.     Cardiology consulted for NSTEMI.  Troponin 66, 83, 128.  EKG sinus rhythm with no acute ischemic changes.  Echo normal with EF 60%. Patient reports palpitations and increased dyspnea over the past few days but denies any chest pain until an episode last night while here in the hospital. States she thought she was having some indigestion, was given 2 SL nitro and chest pain completely resolved.    Past Medical History:   Diagnosis Date    Acquired hypothyroidism 1/13/2023    Bacterial vaginosis     Cystocele with rectocele 11/28/2007    1st degree cystocele; 2nd degree rectocele    Cystocele, midline 12/12/2007    midline    Depressive disorder 09/2004    mild    Dysmenorrhea 2006    severe first day and getting worse    Esophageal reflux     Granulation tissue 08/12/2008    5 - 8 mm long lower post repair granulation tissue - treated with sliver nitrate cautery    Hypercholesterolemia 09/20/2004    cholesterol is 219 mg/dl    Hyperlipidemia 10/26/2011    mile;  TG  71 mg/dl;  HDL  67 mg/dl;  LDL  136 mg/dl;  total cholesterol  217 mg/dl.  10/13/2017:  LDL  163 mg/dl;  total cholesterol  260 mg/dl; triglycerides  186 mg/dl;  HDL  66 mg/dl    Hypertension     Hypertriglyceridemia 10/04/2010    216 mg/dl;  [id number]    Hypertriglyceridemia 10/04/2010    216  mg/dll  02/01/2013 - recent kevek us 238 mg/dl    IBD (inflammatory bowel disease)      Iron deficiency anemia due to chronic blood loss 12/19/2022    Large uterus     Menorrhagia     Non-healing wound of left lower extremity 12/12/2022    Polymenorrhea     PONV (postoperative nausea and vomiting)     Post-hysterectomy menopause     Rectocele     Stress incontinence     Ulcerative colitis 08/2020    Ulcerative colitis confirmed by colonoscopy by Dr. Ho Capellan    Uterine prolapse     Vaginal discharge        Past Surgical History:   Procedure Laterality Date    anterior/posterior colporrhaphy with vaginal enterocele repair  06/18/2008    APPENDECTOMY      COLONOSCOPY      DILATION AND CURETTAGE OF UTERUS      [id number]    ENDOMETRIAL BIOPSY  02/16/2006    ENDOMETRIAL BIOPSY REPEAT   11/06/2007    HYSTEROSCOPY  11/28/2007    SILVER NITRATE CAUTERY OF VAGINAL WALL  08/12/2008    SIS  11/15/2007    TOT SLING  06/18/2008    TOTAL ABDOMINAL HYSTERECTOMY         Review of patient's allergies indicates:  No Known Allergies    No current facility-administered medications on file prior to encounter.     Current Outpatient Medications on File Prior to Encounter   Medication Sig    estradioL (ESTRACE) 1 MG tablet Take 1 tablet (1 mg total) by mouth once daily.    levothyroxine (SYNTHROID) 100 MCG tablet Take 100 mcg by mouth before breakfast.    lisinopriL (PRINIVIL,ZESTRIL) 5 MG tablet Take 5 mg by mouth once daily.    metoprolol succinate (TOPROL-XL) 25 MG 24 hr tablet Take 25 mg by mouth once daily.    omeprazole (PRILOSEC) 40 MG capsule Take 40 mg by mouth 2 (two) times daily.    rosuvastatin (CRESTOR) 10 MG tablet Take 10 mg by mouth every other day.    inFLIXimab (REMICADE) 100 mg injection as directed Intravenous     Family History    Family history is unknown by patient.       Tobacco Use    Smoking status: Never    Smokeless tobacco: Never   Substance and Sexual Activity    Alcohol use: Never    Drug use: Never    Sexual activity: Yes     Partners: Male     Birth control/protection: See Surgical Hx      Review of Systems   Constitutional: Positive for fever (last week). Negative for chills.   HENT: Negative.     Eyes: Negative.    Cardiovascular:  Positive for chest pain (chest pain last night that was relieved with SL nitro) and dyspnea on exertion.   Respiratory:  Positive for cough and shortness of breath.    Endocrine: Negative.    Hematologic/Lymphatic: Negative.    Skin: Negative.    Musculoskeletal: Negative.    Gastrointestinal: Negative.    Psychiatric/Behavioral: Negative.     All other systems reviewed and are negative.    Objective:     Vital Signs (Most Recent):  Temp: 99.9 °F (37.7 °C) (11/22/24 0704)  Pulse: 97 (11/22/24 0704)  Resp: 18 (11/22/24 0704)  BP: 108/60 (11/22/24 0704)  SpO2: (!) 94 % (11/22/24 0704) Vital Signs (24h Range):  Temp:  [98.4 °F (36.9 °C)-99.9 °F (37.7 °C)] 99.9 °F (37.7 °C)  Pulse:  [] 97  Resp:  [16-20] 18  SpO2:  [94 %-99 %] 94 %  BP: ()/(57-79) 108/60     Weight: 78.3 kg (172 lb 9.6 oz)  Body mass index is 26.24 kg/m².    SpO2: (!) 94 %       No intake or output data in the 24 hours ending 11/22/24 1053    Lines/Drains/Airways       Peripheral Intravenous Line  Duration                  Peripheral IV - Single Lumen 11/21/24 1731 20 G Right Antecubital <1 day                     Physical Exam  Vitals reviewed.   Constitutional:       General: She is not in acute distress.  HENT:      Nose: Nose normal.      Mouth/Throat:      Mouth: Mucous membranes are moist.      Pharynx: Oropharynx is clear.   Eyes:      Pupils: Pupils are equal, round, and reactive to light.   Cardiovascular:      Rate and Rhythm: Normal rate and regular rhythm.      Heart sounds: Normal heart sounds.   Pulmonary:      Effort: Pulmonary effort is normal.      Breath sounds: Normal breath sounds.   Abdominal:      General: Bowel sounds are normal.      Palpations: Abdomen is soft.   Musculoskeletal:      Right lower leg: No edema.      Left lower leg: No edema.   Skin:     General: Skin  "is warm and dry.   Neurological:      Mental Status: She is alert and oriented to person, place, and time.          Significant Labs: ABG: No results for input(s): "PH", "PCO2", "HCO3", "POCSATURATED", "BE" in the last 48 hours., Blood Culture: No results for input(s): "LABBLOO" in the last 48 hours., BMP:   Recent Labs   Lab 11/20/24  1353 11/21/24  1732 11/22/24  0603   GLU 99 119* 104   * 131* 132*   K 3.8 4.2 3.6    98 100   CO2 25 23 22*   BUN 12 15 12   CREATININE 0.72 0.85 0.72   CALCIUM 8.6 8.8 8.3*   MG  --  1.8 1.7   , CMP   Recent Labs   Lab 11/20/24  1353 11/21/24  1732 11/22/24  0603   * 131* 132*   K 3.8 4.2 3.6    98 100   CO2 25 23 22*   GLU 99 119* 104   BUN 12 15 12   CREATININE 0.72 0.85 0.72   CALCIUM 8.6 8.8 8.3*   PROT 7.4 7.6 6.7   ALBUMIN 3.4 3.2* 2.9*   BILITOT 0.4 0.6 0.6   ALKPHOS 95 115 102   AST 28 41* 43*   ALT 22 35 34   ANIONGAP 13 14 14   , CBC   Recent Labs   Lab 11/20/24  1353 11/21/24  1732 11/22/24  0603   WBC 8.14 9.76 10.26   HGB 11.0* 11.1* 9.3*   HCT 33.4* 33.6* 28.3*   * 401* 349   , INR   Recent Labs   Lab 11/22/24  0603   INR 1.38   , Lipid Panel   Recent Labs   Lab 11/22/24  0603   CHOL 145   HDL 39   LDLCALC 88   TRIG 92   CHOLHDL 3.7   , and Troponin No results for input(s): "TROPONINI" in the last 48 hours.    Significant Imaging: Echocardiogram: Transthoracic echo (TTE) complete (Cupid Only):   Results for orders placed or performed during the hospital encounter of 11/21/24   Echo   Result Value Ref Range    BSA 1.94 m2    A4C EF 82 %    LVOT stroke volume 114.8 cm3    LVIDd 4.1 3.5 - 6.0 cm    LV Systolic Volume 18.26 mL    LV Systolic Volume Index 9.5 mL/m2    LVIDs 2.3 2.1 - 4.0 cm    LV Diastolic Volume 71.99 mL    LV ESV A4C 20.29 mL    LV Diastolic Volume Index 37.49 mL/m2    LV EDV A4C 63.15 mL    Left Ventricular End Systolic Volume by Teichholz Method 18.26 mL    Left Ventricular End Diastolic Volume by Teichholz Method 71.99 mL "    IVS 1.0 0.6 - 1.1 cm    LVOT diameter 2.4 cm    LVOT area 4.5 cm2    FS 43.9 28 - 44 %    Left Ventricle Relative Wall Thickness 0.54 cm    PW 1.1 0.6 - 1.1 cm    LV mass 141.5 g    LV Mass Index 73.7 g/m2    MV Peak E Pieter 0.86 m/s    TDI LATERAL 0.13 m/s    TDI SEPTAL 0.08 m/s    E/E' ratio 8.19 m/s    MV Peak A Pieter 0.96 m/s    TR Max Pieter 2.22 m/s    E/A ratio 0.90     E wave deceleration time 179.27 msec    LV SEPTAL E/E' RATIO 10.75 m/s    LV LATERAL E/E' RATIO 6.62 m/s    LVOT peak pieter 1.3 m/s    Left Ventricular Outflow Tract Mean Velocity 1.15 cm/s    Left Ventricular Outflow Tract Mean Gradient 5.44 mmHg    RV- lopez basal diam 3.6 cm    RV-lopez mid d 3.3 cm    RV Basal Diameter 5.28 cm    RV-lopez length 5.3 cm    RV mid diameter 3.29 cm    TAPSE 2.88 cm    RV/LV Ratio 0.88 cm    LA size 3.39 cm    RA Major Axis 3.96 cm    AV mean gradient 7.5 mmHg    AV peak gradient 14.4 mmHg    Ao peak pieter 1.9 m/s    Ao VTI 35.9 cm    LVOT peak VTI 25.4 cm    AV valve area 3.2 cm²    AV Velocity Ratio 0.68     AV index (prosthetic) 0.71     MARY CARMEN by Velocity Ratio 3.1 cm²    MV stenosis pressure 1/2 time 51.99 ms    MV valve area p 1/2 method 4.23 cm2    Triscuspid Valve Regurgitation Peak Gradient 20 mmHg    PV PEAK VELOCITY 1.12 m/s    PV peak gradient 5 mmHg    Ao root annulus 3.08 cm    IVC diameter 1.56 cm    Mean e' 0.11 m/s    ZLVIDS -2.94     ZLVIDD -2.79     LA area A4C 10.53 cm2    AORTIC VALVE CUSP SEPERATION 2.43 cm    EF 60 %    TV resting pulmonary artery pressure 23 mmHg    RV TB RVSP 5 mmHg    Est. RA pres 3 mmHg   , EKG:   Results for orders placed or performed during the hospital encounter of 02/25/22   EKG 12-lead    Collection Time: 02/25/22 10:06 AM    Narrative    Test Reason : K51.819,    Vent. Rate : 088 BPM     Atrial Rate : 088 BPM     P-R Int : 188 ms          QRS Dur : 084 ms      QT Int : 376 ms       P-R-T Axes : 073 030 070 degrees     QTc Int : 454 ms    Normal sinus rhythm  Cannot rule out  Anterior infarct ,age undetermined  Abnormal ECG  No previous ECGs available  Confirmed by Roxanne MOREL, To WILLS ([id number]) on 4/6/2022 5:40:20 PM    Referred By: CELENA EUGENE           Confirmed By:To Oliveira MD    , and X-Ray: CXR: X-Ray Chest 1 View (CXR): No results found for this visit on 11/21/24.  Assessment and Plan:     * NSTEMI (non-ST elevated myocardial infarction)  - Patient seen and evaluated by Dr. Chaves  - Troponin 66, 83, 128; EKG SR with no acute ischemic changes (ST, rate 129 on admission); Echo normal with EF 60%  - BNP 6K  - Ischemia vs Type 2 MI secondary to tachycardia.   - [facility] today. Procedure, risk, and benefits discussed in detail with patient. She verbalized understanding and wishes to proceed. Consent obtained and on chart.   - Further recommendations to follow.    Sinus tachycardia  - Previously seen by Dr. Riley for palpitations, records reviewed, had outpatient cardiac monitoring with no significant findings  - Continue BB    Primary hypertension  Patients blood pressure range in the last 24 hours was: BP  Min: 90/59  Max: 127/70.The patient's inpatient anti-hypertensive regimen is listed below:  Current Antihypertensives  lisinopriL tablet 5 mg, Daily, Oral  metoprolol succinate (TOPROL-XL) 24 hr tablet 25 mg, Daily, Oral  nitroGLYCERIN SL tablet 0.4 mg, Every 5 min PRN, Sublingual    Plan  - BP is controlled, no changes needed to their regimen    Hypothyroidism  - TSH normal        VTE Risk Mitigation (From admission, onward)           Ordered     enoxaparin injection 80 mg  Every 12 hours         11/21/24 2347     IP VTE LOW RISK PATIENT  Once         11/21/24 2347     Place sequential compression device  Until discontinued         11/21/24 2347     Place KARLY hose  Until discontinued         11/21/24 2347                    Thank you for your consult. I will follow-up with patient. Please contact us if you have any additional questions.    Felecia Gutierrez, SAUL  Cardiology   [facility] - [address] [place] [address] Telemetry    PT seen and examined, chart reviewed, essentially agree with findings as documented,     CC: shortness of breath  HPI: Pt reports four day history of increased heart rate and shortness of breath, for which she presented to ER last PM.  She was found to have elevated troponins, reports mid sternal chest pain while at rest last pm, relivced with 2 sl ntg.  This is her first event of chest discomfort.  PE: agree with above  PMH: reviewed  Labs, xrays, eKGS, reviewed    IMP/Plan:  NSTEMI, chest pain consistent with anginal equivalen, recommend LHC/poss, discussed with her and her  at bedside, risks and benefits revewed, pt elects to proceed, will schuddddddddddddddddddddddddddddddddddddddddssssssssdddddddddddddddsssssssssddddddddddddddddddddddddddddddddddddddddddddddddddsdddddddddlllllllllllllllllllllllllllllllllllld

## 2024-11-22 NOTE — NURSING
Written and oral DC instructions given to patient, verbalized understanding.Removed IV site, tip intact. Right groin site dry and intact. Medications delivered to room earlier, and others called in to [facility] on [facility].

## 2024-11-22 NOTE — ASSESSMENT & PLAN NOTE
- Previously seen by Dr. Riley for palpitations, records reviewed, had outpatient cardiac monitoring with no significant findings  - Continue BB

## 2024-11-22 NOTE — DISCHARGE SUMMARY
[facility] - [address] [place] [address] Medicine  Discharge Summary      Patient Name: Isabella Kelly  MRN: [id number]  [facility]: [id number]  Patient Class: IP- Inpatient  Admission Date: 11/21/2024  Hospital Length of Stay: 0 days  Discharge Date and Time:  11/22/2024 4:29 PM  Attending Physician: Neelima Montenegro MD   Discharging Provider: Neelima Montenegro MD  Primary Care Provider: Edy Villalpando DO    Primary Care Team: Networked reference to record PCT     HPI:   Patient is a 70 yo F who presents to [facility] with complaints of sob, chest pain that started earlier today. She reports the pain occurred on exertion. Patient also reported her heart rate felt like it was beating fast during that time as well. Patient sees Dr. Riley currently and reports undergoing an echo a month ago that was normal. She denies any CAD or LHC. Patient reports she takes metoprolol to help control her heart rate as she states it sometimes races for a few minutes.     Initial presenting vitals , BP 90/59, Spo2 96%, Temp 98.4, patient received 1 L NS bolus and HR returned to 85 in the ED. Lab work was significant for elevated troponin's with rising trend of 66.2 83.7, 128.7, proBNP 6208, WBC 9.76, H/H 11.1/33.6, Platelets 401, Na 131, K 4.2, Cl 98, Co2 23, BUN/Cr 15/0.85, Glu 119, Calcium 8.8, mag 1.8, TSH 1.952, UA unremarkable EKG showed  sinus tachycardia.     This patient will be placed in observation at [facility] for continued medical management.     Procedure(s) (LRB):  Angiogram, Coronary, with Left Heart Cath (N/A)  Percutaneous coronary intervention (N/A)      Hospital Course:   70 y/o female with PMH of HLD, HTN, hypothyroidism, ulcerative colitis, IBS, GERD admitted for NSTEMI. LHC performed today with stenting to mid LAD. Cleared by cardiology for discharge with referrals to cardiac rehab. Follow up with cardiology as scheduled and pcp.     Goals of Care Treatment  Preferences:  Code Status: Full Code      SDOH Screening:  The patient was screened for utility difficulties, food insecurity, transport difficulties, housing insecurity, and interpersonal safety and there were no concerns identified this admission.     Consults:   Consults (From admission, onward)          Status Ordering Provider     Inpatient consult to Social Work/Case Management  Once        Provider:  (Not yet assigned)    Completed JENI ALVAREZ     Inpatient consult to Cardiology  Once        Provider:  (Not yet assigned)    Completed JENI ALVAREZ            Cardiac/Vascular  CAD S/P percutaneous coronary angioplasty  Patient with known CAD s/p stent placement, mid LAD  Discharged with Brilinta, metoprolol, asa, lisinopril, statin  Cardiac rehab referrals placed  Follow up with Cardio as scheduled    Sinus tachycardia  Resolved after fluid in the ED, will continue patients home metoprolol. TSH WNL Will continue to monitor and adjust accordingly.       Final Active Diagnoses:    Diagnosis Date Noted POA    PRINCIPAL PROBLEM:  NSTEMI (non-ST elevated myocardial infarction) [I21.4] 11/22/2024 Yes    Sinus tachycardia [R00.0] 11/22/2024 Yes    CAD S/P percutaneous coronary angioplasty [I25.10, Z98.61] 11/22/2024 Not Applicable    Primary hypertension [I10] 01/13/2023 Yes    Hypothyroidism [E03.9] 01/13/2023 Yes      Problems Resolved During this Admission:       Discharged Condition: stable    Disposition: Home or Self Care    Follow Up:   Follow-up Information       Chelita Dahl, SAUL Follow up on 12/10/2024.    Specialty: Cardiology  Why: Appointment scheduled with Cardiology on 12/10/2024 at 8:30 a.m.  Contact information:  [address] [place] [address] [facility] [id number]  [phone]               Edy Villalpando, DO Follow up in 1 week(s).    Specialty: Family Medicine  Contact information:  [address] [facility] [id number] [phone]                            Patient Instructions:      Cardiac rehab phase II   Standing Status: Future Standing Exp. Date: 11/22/25     Order Specific Question Answer Comments   Department [facility] CARDIAC REHAB    Select qualifying diagnosis: I21.4 - Non-ST elevation (NSTEMI) myocardial infarction        Significant Diagnostic Studies: N/A    Pending Diagnostic Studies:       Procedure Component Value Units Date/Time    EKG 12-lead [[id number]]     Order Status: Sent Lab Status: No result            Medications:  Reconciled Home Medications:      Medication List        START taking these medications      aspirin 81 MG Chew  Take 1 tablet (81 mg total) by mouth once daily.  Start taking on: November 23, 2024     atorvastatin 80 MG tablet  Commonly known as: LIPITOR  Take 1 tablet (80 mg total) by mouth once daily.  Start taking on: November 23, 2024     lisinopriL 5 MG tablet  Commonly known as: PRINIVIL,ZESTRIL  Take 1 tablet (5 mg total) by mouth once daily.     nitroGLYCERIN 0.4 MG SL tablet  Commonly known as: NITROSTAT  Place 1 tablet (0.4 mg total) under the tongue every 5 (five) minutes as needed for Chest pain (for a max of 3 tabs in 15 minutes).  Start taking on: November 23, 2024     ticagrelor 90 mg tablet  Commonly known as: BRILINTA  Take 1 tablet (90 mg total) by mouth 2 (two) times daily.            CONTINUE taking these medications      estradioL 1 MG tablet  Commonly known as: ESTRACE  Take 1 tablet (1 mg total) by mouth once daily.     inFLIXimab 100 mg injection  as directed Intravenous     levothyroxine 100 MCG tablet  Commonly known as: SYNTHROID  Take 100 mcg by mouth before breakfast.     metoprolol succinate 25 MG 24 hr tablet  Commonly known as: TOPROL-XL  Take 25 mg by mouth once daily.     omeprazole 40 MG capsule  Commonly known as: PRILOSEC  Take 40 mg by mouth 2 (two) times daily.              Indwelling Lines/Drains at time of discharge:   Lines/Drains/Airways       None                   Time spent on the  discharge of patient: 40 minutes         Neelima Montenegro MD  Department of Hospital Medicine  [facility] - [address] [place] [address]

## 2024-11-22 NOTE — SUBJECTIVE & OBJECTIVE
Past Medical History:   Diagnosis Date    Acquired hypothyroidism 1/13/2023    Bacterial vaginosis     Cystocele with rectocele 11/28/2007    1st degree cystocele; 2nd degree rectocele    Cystocele, midline 12/12/2007    midline    Depressive disorder 09/2004    mild    Dysmenorrhea 2006    severe first day and getting worse    Esophageal reflux     Granulation tissue 08/12/2008    5 - 8 mm long lower post repair granulation tissue - treated with sliver nitrate cautery    Hypercholesterolemia 09/20/2004    cholesterol is 219 mg/dl    Hyperlipidemia 10/26/2011    mile;  TG  71 mg/dl;  HDL  67 mg/dl;  LDL  136 mg/dl;  total cholesterol  217 mg/dl.  10/13/2017:  LDL  163 mg/dl;  total cholesterol  260 mg/dl; triglycerides  186 mg/dl;  HDL  66 mg/dl    Hypertension     Hypertriglyceridemia 10/04/2010    216 mg/dl;  [id number]    Hypertriglyceridemia 10/04/2010    216  mg/dll  02/01/2013 - recent kevek us 238 mg/dl    IBD (inflammatory bowel disease)     Iron deficiency anemia due to chronic blood loss 12/19/2022    Large uterus     Menorrhagia     Non-healing wound of left lower extremity 12/12/2022    Polymenorrhea     PONV (postoperative nausea and vomiting)     Post-hysterectomy menopause     Rectocele     Stress incontinence     Ulcerative colitis 08/2020    Ulcerative colitis confirmed by colonoscopy by Dr. Ho Capellan    Uterine prolapse     Vaginal discharge        Past Surgical History:   Procedure Laterality Date    anterior/posterior colporrhaphy with vaginal enterocele repair  06/18/2008    APPENDECTOMY      COLONOSCOPY      DILATION AND CURETTAGE OF UTERUS      [id number]    ENDOMETRIAL BIOPSY  02/16/2006    ENDOMETRIAL BIOPSY REPEAT   11/06/2007    HYSTEROSCOPY  11/28/2007    SILVER NITRATE CAUTERY OF VAGINAL WALL  08/12/2008    SIS  11/15/2007    TOT SLING  06/18/2008    TOTAL ABDOMINAL HYSTERECTOMY         Review of patient's allergies indicates:  No Known Allergies    No current facility-administered  medications on file prior to encounter.     Current Outpatient Medications on File Prior to Encounter   Medication Sig    estradioL (ESTRACE) 1 MG tablet Take 1 tablet (1 mg total) by mouth once daily.    levothyroxine (SYNTHROID) 100 MCG tablet Take 100 mcg by mouth before breakfast.    lisinopriL (PRINIVIL,ZESTRIL) 5 MG tablet Take 5 mg by mouth once daily.    metoprolol succinate (TOPROL-XL) 25 MG 24 hr tablet Take 25 mg by mouth once daily.    omeprazole (PRILOSEC) 40 MG capsule Take 40 mg by mouth 2 (two) times daily.    rosuvastatin (CRESTOR) 10 MG tablet Take 10 mg by mouth every other day.    inFLIXimab (REMICADE) 100 mg injection as directed Intravenous     Family History    Family history is unknown by patient.       Tobacco Use    Smoking status: Never    Smokeless tobacco: Never   Substance and Sexual Activity    Alcohol use: Never    Drug use: Never    Sexual activity: Yes     Partners: Male     Birth control/protection: See Surgical Hx     Review of Systems   Constitutional: Positive for fever (last week). Negative for chills.   HENT: Negative.     Eyes: Negative.    Cardiovascular:  Positive for chest pain (chest pain last night that was relieved with SL nitro) and dyspnea on exertion.   Respiratory:  Positive for cough and shortness of breath.    Endocrine: Negative.    Hematologic/Lymphatic: Negative.    Skin: Negative.    Musculoskeletal: Negative.    Gastrointestinal: Negative.    Psychiatric/Behavioral: Negative.     All other systems reviewed and are negative.    Objective:     Vital Signs (Most Recent):  Temp: 99.9 °F (37.7 °C) (11/22/24 0704)  Pulse: 97 (11/22/24 0704)  Resp: 18 (11/22/24 0704)  BP: 108/60 (11/22/24 0704)  SpO2: (!) 94 % (11/22/24 0704) Vital Signs (24h Range):  Temp:  [98.4 °F (36.9 °C)-99.9 °F (37.7 °C)] 99.9 °F (37.7 °C)  Pulse:  [] 97  Resp:  [16-20] 18  SpO2:  [94 %-99 %] 94 %  BP: ()/(57-79) 108/60     Weight: 78.3 kg (172 lb 9.6 oz)  Body mass index is 26.24  "kg/m².    SpO2: (!) 94 %       No intake or output data in the 24 hours ending 11/22/24 1053    Lines/Drains/Airways       Peripheral Intravenous Line  Duration                  Peripheral IV - Single Lumen 11/21/24 1731 20 G Right Antecubital <1 day                     Physical Exam  Vitals reviewed.   Constitutional:       General: She is not in acute distress.  HENT:      Nose: Nose normal.      Mouth/Throat:      Mouth: Mucous membranes are moist.      Pharynx: Oropharynx is clear.   Eyes:      Pupils: Pupils are equal, round, and reactive to light.   Cardiovascular:      Rate and Rhythm: Normal rate and regular rhythm.      Heart sounds: Normal heart sounds.   Pulmonary:      Effort: Pulmonary effort is normal.      Breath sounds: Normal breath sounds.   Abdominal:      General: Bowel sounds are normal.      Palpations: Abdomen is soft.   Musculoskeletal:      Right lower leg: No edema.      Left lower leg: No edema.   Skin:     General: Skin is warm and dry.   Neurological:      Mental Status: She is alert and oriented to person, place, and time.          Significant Labs: ABG: No results for input(s): "PH", "PCO2", "HCO3", "POCSATURATED", "BE" in the last 48 hours., Blood Culture: No results for input(s): "LABBLOO" in the last 48 hours., BMP:   Recent Labs   Lab 11/20/24  1353 11/21/24  1732 11/22/24  0603   GLU 99 119* 104   * 131* 132*   K 3.8 4.2 3.6    98 100   CO2 25 23 22*   BUN 12 15 12   CREATININE 0.72 0.85 0.72   CALCIUM 8.6 8.8 8.3*   MG  --  1.8 1.7   , CMP   Recent Labs   Lab 11/20/24  1353 11/21/24  1732 11/22/24  0603   * 131* 132*   K 3.8 4.2 3.6    98 100   CO2 25 23 22*   GLU 99 119* 104   BUN 12 15 12   CREATININE 0.72 0.85 0.72   CALCIUM 8.6 8.8 8.3*   PROT 7.4 7.6 6.7   ALBUMIN 3.4 3.2* 2.9*   BILITOT 0.4 0.6 0.6   ALKPHOS 95 115 102   AST 28 41* 43*   ALT 22 35 34   ANIONGAP 13 14 14   , CBC   Recent Labs   Lab 11/20/24  1353 11/21/24  1732 11/22/24  0603   WBC " "8.14 9.76 10.26   HGB 11.0* 11.1* 9.3*   HCT 33.4* 33.6* 28.3*   * 401* 349   , INR   Recent Labs   Lab 11/22/24  0603   INR 1.38   , Lipid Panel   Recent Labs   Lab 11/22/24  0603   CHOL 145   HDL 39   LDLCALC 88   TRIG 92   CHOLHDL 3.7   , and Troponin No results for input(s): "TROPONINI" in the last 48 hours.    Significant Imaging: Echocardiogram: Transthoracic echo (TTE) complete (Cupid Only):   Results for orders placed or performed during the hospital encounter of 11/21/24   Echo   Result Value Ref Range    BSA 1.94 m2    A4C EF 82 %    LVOT stroke volume 114.8 cm3    LVIDd 4.1 3.5 - 6.0 cm    LV Systolic Volume 18.26 mL    LV Systolic Volume Index 9.5 mL/m2    LVIDs 2.3 2.1 - 4.0 cm    LV Diastolic Volume 71.99 mL    LV ESV A4C 20.29 mL    LV Diastolic Volume Index 37.49 mL/m2    LV EDV A4C 63.15 mL    Left Ventricular End Systolic Volume by Teichholz Method 18.26 mL    Left Ventricular End Diastolic Volume by Teichholz Method 71.99 mL    IVS 1.0 0.6 - 1.1 cm    LVOT diameter 2.4 cm    LVOT area 4.5 cm2    FS 43.9 28 - 44 %    Left Ventricle Relative Wall Thickness 0.54 cm    PW 1.1 0.6 - 1.1 cm    LV mass 141.5 g    LV Mass Index 73.7 g/m2    MV Peak E Pieter 0.86 m/s    TDI LATERAL 0.13 m/s    TDI SEPTAL 0.08 m/s    E/E' ratio 8.19 m/s    MV Peak A Pieter 0.96 m/s    TR Max Pieter 2.22 m/s    E/A ratio 0.90     E wave deceleration time 179.27 msec    LV SEPTAL E/E' RATIO 10.75 m/s    LV LATERAL E/E' RATIO 6.62 m/s    LVOT peak pieter 1.3 m/s    Left Ventricular Outflow Tract Mean Velocity 1.15 cm/s    Left Ventricular Outflow Tract Mean Gradient 5.44 mmHg    RV- lopez basal diam 3.6 cm    RV-lopez mid d 3.3 cm    RV Basal Diameter 5.28 cm    RV-lopez length 5.3 cm    RV mid diameter 3.29 cm    TAPSE 2.88 cm    RV/LV Ratio 0.88 cm    LA size 3.39 cm    RA Major Axis 3.96 cm    AV mean gradient 7.5 mmHg    AV peak gradient 14.4 mmHg    Ao peak pieter 1.9 m/s    Ao VTI 35.9 cm    LVOT peak VTI 25.4 cm    AV valve area " 3.2 cm²    AV Velocity Ratio 0.68     AV index (prosthetic) 0.71     MARY CARMEN by Velocity Ratio 3.1 cm²    MV stenosis pressure 1/2 time 51.99 ms    MV valve area p 1/2 method 4.23 cm2    Triscuspid Valve Regurgitation Peak Gradient 20 mmHg    PV PEAK VELOCITY 1.12 m/s    PV peak gradient 5 mmHg    Ao root annulus 3.08 cm    IVC diameter 1.56 cm    Mean e' 0.11 m/s    ZLVIDS -2.94     ZLVIDD -2.79     LA area A4C 10.53 cm2    AORTIC VALVE CUSP SEPERATION 2.43 cm    EF 60 %    TV resting pulmonary artery pressure 23 mmHg    RV TB RVSP 5 mmHg    Est. RA pres 3 mmHg   , EKG:   Results for orders placed or performed during the hospital encounter of 02/25/22   EKG 12-lead    Collection Time: 02/25/22 10:06 AM    Narrative    Test Reason : K51.819,    Vent. Rate : 088 BPM     Atrial Rate : 088 BPM     P-R Int : 188 ms          QRS Dur : 084 ms      QT Int : 376 ms       P-R-T Axes : 073 030 070 degrees     QTc Int : 454 ms    Normal sinus rhythm  Cannot rule out Anterior infarct ,age undetermined  Abnormal ECG  No previous ECGs available  Confirmed by Roxanne MOREL, To WILLS ([id number]) on 4/6/2022 5:40:20 PM    Referred By: CELENA EUGENE           Confirmed By:To Oliveira MD    , and X-Ray: CXR: X-Ray Chest 1 View (CXR): No results found for this visit on 11/21/24.

## 2024-11-22 NOTE — HPI
Patient is a 70 yo F who presents to [facility] with complaints of sob, chest pain that started earlier today. She reports the pain occurred on exertion. Patient also reported her heart rate felt like it was beating fast during that time as well. Patient sees Dr. Riley currently and reports undergoing an echo a month ago that was normal. She denies any CAD or LHC. Patient reports she takes metoprolol to help control her heart rate as she states it sometimes races for a few minutes.     Initial presenting vitals , BP 90/59, Spo2 96%, Temp 98.4, patient received 1 L NS bolus and HR returned to 85 in the ED. Lab work was significant for elevated troponin's with rising trend of 66.2 83.7, 128.7, proBNP 6208, WBC 9.76, H/H 11.1/33.6, Platelets 401, Na 131, K 4.2, Cl 98, Co2 23, BUN/Cr 15/0.85, Glu 119, Calcium 8.8, mag 1.8, TSH 1.952, UA unremarkable EKG showed  sinus tachycardia.     This patient will be placed in observation at [facility] for continued medical management.

## 2024-11-22 NOTE — PLAN OF CARE
SS consulted for dc planning due to pt admitting diagnosis of NSTEMI. SS spoke with pt and family at bedside and provided pt with information packet for cardiac rehab. At this time, pt is not interested in [facility] cardiac rehab and states she will contact the number provided on packet for outpatient cardiac rehab if she wishes to proceed with outpatient cardiac rehab. Ss following

## 2024-11-22 NOTE — ASSESSMENT & PLAN NOTE
"Patient presents with NSTEMI. Chest pain is currently controlled. BASIL score is 3. Patient is currently on NSTEMI Pathway.    EKG reviewed. Troponins reviewed and results noted-   No results for input(s): "TROPONINI", "TROPONINIHS" in the last 168 hours..   Troponin trended 66.2 to 83.7 to 128.7,     Lipid panel pending-     No results found for: "LDLCALC"  No results found for: "TRIG"      Medical management includes; Beta Blocker, Anticoagulation, High Intensity Stain, and ACE/ARB Echo has been performed. Latest ECHO results are as follows- Results for orders placed during the hospital encounter of 03/30/22  Echo pending  Echo    Interpretation Summary  · The left ventricle is normal in size with mild predominantly basal septal mild asymmetric hypertrophy eccentric hypertrophy and normal systolic function.  · The estimated ejection fraction is 60%.  · Normal left ventricular diastolic function.  · Normal right ventricular size with normal right ventricular systolic function.  · Mild tricuspid regurgitation.  .   Consult for cardiac rehab is not ordered. Cardiology is consulted. Continue to monitor patient closely and adjust therapy as needed.      "

## 2024-11-22 NOTE — SUBJECTIVE & OBJECTIVE
Past Medical History:   Diagnosis Date    Acquired hypothyroidism 1/13/2023    Bacterial vaginosis     Cystocele with rectocele 11/28/2007    1st degree cystocele; 2nd degree rectocele    Cystocele, midline 12/12/2007    midline    Depressive disorder 09/2004    mild    Dysmenorrhea 2006    severe first day and getting worse    Esophageal reflux     Granulation tissue 08/12/2008    5 - 8 mm long lower post repair granulation tissue - treated with sliver nitrate cautery    Hypercholesterolemia 09/20/2004    cholesterol is 219 mg/dl    Hyperlipidemia 10/26/2011    mile;  TG  71 mg/dl;  HDL  67 mg/dl;  LDL  136 mg/dl;  total cholesterol  217 mg/dl.  10/13/2017:  LDL  163 mg/dl;  total cholesterol  260 mg/dl; triglycerides  186 mg/dl;  HDL  66 mg/dl    Hypertension     Hypertriglyceridemia 10/04/2010    216 mg/dl;  [id number]    Hypertriglyceridemia 10/04/2010    216  mg/dll  02/01/2013 - recent kevek us 238 mg/dl    IBD (inflammatory bowel disease)     Iron deficiency anemia due to chronic blood loss 12/19/2022    Large uterus     Menorrhagia     Non-healing wound of left lower extremity 12/12/2022    Polymenorrhea     PONV (postoperative nausea and vomiting)     Post-hysterectomy menopause     Rectocele     Stress incontinence     Ulcerative colitis 08/2020    Ulcerative colitis confirmed by colonoscopy by Dr. Ho Capellan    Uterine prolapse     Vaginal discharge        Past Surgical History:   Procedure Laterality Date    anterior/posterior colporrhaphy with vaginal enterocele repair  06/18/2008    APPENDECTOMY      COLONOSCOPY      DILATION AND CURETTAGE OF UTERUS      [id number]    ENDOMETRIAL BIOPSY  02/16/2006    ENDOMETRIAL BIOPSY REPEAT   11/06/2007    HYSTEROSCOPY  11/28/2007    SILVER NITRATE CAUTERY OF VAGINAL WALL  08/12/2008    SIS  11/15/2007    TOT SLING  06/18/2008    TOTAL ABDOMINAL HYSTERECTOMY         Review of patient's allergies indicates:  No Known Allergies    No current facility-administered  medications on file prior to encounter.     Current Outpatient Medications on File Prior to Encounter   Medication Sig    estradioL (ESTRACE) 1 MG tablet Take 1 tablet (1 mg total) by mouth once daily.    levothyroxine (SYNTHROID) 100 MCG tablet Take 100 mcg by mouth before breakfast.    lisinopriL (PRINIVIL,ZESTRIL) 5 MG tablet Take 5 mg by mouth once daily.    metoprolol succinate (TOPROL-XL) 25 MG 24 hr tablet Take 25 mg by mouth once daily.    omeprazole (PRILOSEC) 40 MG capsule Take 40 mg by mouth 2 (two) times daily.    rosuvastatin (CRESTOR) 10 MG tablet Take 10 mg by mouth every other day.    inFLIXimab (REMICADE) 100 mg injection as directed Intravenous    [DISCONTINUED] meloxicam (MOBIC) 15 MG tablet TAKE 1 TABLET BY MOUTH EVERY DAY AS NEEDED FOR PAIN (Patient not taking: Reported on 11/18/2024)     Family History    Family history is unknown by patient.       Tobacco Use    Smoking status: Never    Smokeless tobacco: Never   Substance and Sexual Activity    Alcohol use: Never    Drug use: Never    Sexual activity: Yes     Partners: Male     Birth control/protection: See Surgical Hx     Review of Systems   Constitutional:  Positive for fatigue. Negative for chills and fever.   Respiratory:  Positive for cough and shortness of breath.    Cardiovascular:  Positive for chest pain and palpitations. Negative for leg swelling.   Gastrointestinal:  Negative for abdominal pain, anal bleeding, blood in stool, constipation, diarrhea, nausea and vomiting.   Neurological:  Negative for weakness and numbness.     Objective:     Vital Signs (Most Recent):  Temp: 98.5 °F (36.9 °C) (11/21/24 2300)  Pulse: 74 (11/21/24 2300)  Resp: 20 (11/21/24 2300)  BP: 108/67 (11/21/24 2300)  SpO2: 99 % (11/21/24 2300) Vital Signs (24h Range):  Temp:  [98.4 °F (36.9 °C)-98.5 °F (36.9 °C)] 98.5 °F (36.9 °C)  Pulse:  [] 74  Resp:  [16-20] 20  SpO2:  [96 %-99 %] 99 %  BP: ()/(57-71) 108/67     Weight: 78.3 kg (172 lb 9.6  oz)  Body mass index is 26.24 kg/m².     Physical Exam  Vitals and nursing note reviewed.   Constitutional:       Appearance: Normal appearance.   HENT:      Head: Normocephalic.      Right Ear: External ear normal.      Left Ear: External ear normal.      Nose: Nose normal.      Mouth/Throat:      Pharynx: Oropharynx is clear.   Eyes:      Conjunctiva/sclera: Conjunctivae normal.   Cardiovascular:      Rate and Rhythm: Normal rate and regular rhythm.      Pulses: Normal pulses.      Heart sounds: Normal heart sounds.   Pulmonary:      Effort: Pulmonary effort is normal.      Breath sounds: Normal breath sounds.   Abdominal:      General: Abdomen is flat.      Palpations: Abdomen is soft.   Musculoskeletal:      Right lower leg: No edema.      Left lower leg: No edema.   Skin:     General: Skin is warm.   Neurological:      Mental Status: She is alert and oriented to person, place, and time.   Psychiatric:         Mood and Affect: Mood normal.         Behavior: Behavior normal.         Thought Content: Thought content normal.         Judgment: Judgment normal.                Significant Labs: All pertinent labs within the past 24 hours have been reviewed.    Significant Imaging: I have reviewed all pertinent imaging results/findings within the past 24 hours.

## 2024-11-22 NOTE — HOSPITAL COURSE
70 y/o female with PMH of HLD, HTN, hypothyroidism, ulcerative colitis, IBS, GERD admitted for NSTEMI. LHC performed today with stenting to mid LAD. Cleared by cardiology for discharge with referrals to cardiac rehab. Follow up with cardiology as scheduled and pcp.

## 2024-11-22 NOTE — ASSESSMENT & PLAN NOTE
Resolved after fluid in the ED, will continue patients home metoprolol. TSH WNL Will continue to monitor and adjust accordingly.

## 2024-11-25 ENCOUNTER — PATIENT MESSAGE (OUTPATIENT)
Dept: GASTROENTEROLOGY | Facility: CLINIC | Age: 69
End: 2024-11-25
Payer: MEDICARE

## 2024-11-25 NOTE — PLAN OF CARE
[facility] Telemetry  Discharge Final Note    Primary Care Provider: Edy Villalpando DO    Expected Discharge Date: 11/22/2024    Final Discharge Note (most recent)       Final Note - 11/25/24 0820          Final Note    Assessment Type Final Discharge Note     Anticipated Discharge Disposition Home or Self Care     What phone number can be called within the next 1-3 days to see how you are doing after discharge? [phone]        Post-Acute Status    Coverage Medicare     Discharge Delays None known at this time                     Important Message from Medicare  Important Message from Medicare regarding Discharge Appeal Rights: Given to patient/caregiver, Explained to patient/caregiver, Signed/date by patient/caregiver     Date IMM was signed: 11/22/24  Time IMM was signed: 1030    Contact Info       Chelita Dahl FNP   Specialty: Cardiology    [address] [place] [address] [facility] [address]   Phone: [phone]       Next Steps: Follow up on 12/10/2024    Instructions: Appointment scheduled with Cardiology on 12/10/2024 at 8:30 a.m.    Edy iVllalpando DO   Specialty: Family Medicine   Relationship: PCP - General    [facility] [place]  [address] [facility] [address]   Phone: [phone]       Next Steps: Follow up in 1 week(s)          Pt to dc home with family. IM updated. No further needs noted

## 2024-11-26 LAB
OHS QRS DURATION: 78 MS
OHS QRS DURATION: 92 MS
OHS QTC CALCULATION: 445 MS
OHS QTC CALCULATION: 452 MS
POC ACTIVATED CLOTTING TIME K: 202 SEC

## 2024-11-27 PROBLEM — S13.9XXA NECK SPRAIN: Status: RESOLVED | Noted: 2023-01-13 | Resolved: 2024-11-27

## 2024-11-27 PROBLEM — S81.802A NON-HEALING WOUND OF LEFT LOWER EXTREMITY: Status: RESOLVED | Noted: 2022-12-12 | Resolved: 2024-11-27

## 2024-11-27 NOTE — PROGRESS NOTES
[facility]    Chief Complaint      Chief Complaint   Patient presents with    Fever     x4days    Documentation Only     No resp. Symptoms; tylenol and ibuprofen (helps but fever comes right back); no known exp.; 100-102.8; Covid at home was negative, (did the test on Saturday)    Generalized Body Aches    Chills       History of Present Illness      Isabella Kelly is a 69 y.o. female. She  has a past medical history of Acquired hypothyroidism (01/13/2023), CAD S/P percutaneous coronary angioplasty (11/22/2024), Depressive disorder (09/2004), Esophageal reflux, Hyperlipidemia (10/26/2011), Hypertension, IBD (inflammatory bowel disease), Iron deficiency anemia due to chronic blood loss (12/19/2022), Post-hysterectomy menopause, Stress incontinence, Ulcerative colitis (08/2020), and Uterine prolapse., who presents today for fever, body aches, and chills x4 days.  Took an at home Covid test and was negative.   Does state she recently had her injection for her UC and not sure if this could be side effects from it but usually she feels ok after having them.     Past Medical History:  Past Medical History:   Diagnosis Date    Acquired hypothyroidism 01/13/2023    CAD S/P percutaneous coronary angioplasty 11/22/2024    Depressive disorder 09/2004    mild    Esophageal reflux     Hyperlipidemia 10/26/2011    mile;  TG  71 mg/dl;  HDL  67 mg/dl;  LDL  136 mg/dl;  total cholesterol  217 mg/dl.  10/13/2017:  LDL  163 mg/dl;  total cholesterol  260 mg/dl; triglycerides  186 mg/dl;  HDL  66 mg/dl    Hypertension     IBD (inflammatory bowel disease)     Iron deficiency anemia due to chronic blood loss 12/19/2022    Post-hysterectomy menopause     Stress incontinence     Ulcerative colitis 08/2020    Ulcerative colitis confirmed by colonoscopy by Dr. Ho Capellan    Uterine prolapse        Past Surgical History:   has a past surgical history that includes anterior/posterior colporrhaphy with vaginal enterocele  repair (06/18/2008); Appendectomy; Colonoscopy; Dilation and curettage of uterus; Hysteroscopy (11/28/2007); Endometrial biopsy (02/16/2006); ENDOMETRIAL BIOPSY REPEAT  (11/06/2007); SILVER NITRATE CAUTERY OF VAGINAL WALL (08/12/2008); SIS (11/15/2007); Total abdominal hysterectomy; TOT SLING (06/18/2008); ANGIOGRAM, CORONARY, WITH LEFT HEART CATHETERIZATION (N/A, 11/22/2024); and percutaneous coronary intervention, artery (N/A, 11/22/2024).    Social History:  Social History     Tobacco Use    Smoking status: Never    Smokeless tobacco: Never   Substance Use Topics    Alcohol use: Never    Drug use: Never       I personally reviewed all past medical, surgical, and social.     Review of Systems   Constitutional:  Positive for fever.   HENT:  Negative for congestion, ear pain and sore throat.    Respiratory:  Negative for cough and wheezing.    Cardiovascular:  Negative for chest pain.   Gastrointestinal:  Negative for abdominal pain, diarrhea, nausea and vomiting.   Genitourinary:  Negative for dysuria.   Skin:  Negative for rash.   Neurological:  Positive for headaches.        Medications:  Outpatient Encounter Medications as of 11/18/2024   Medication Sig Dispense Refill    inFLIXimab (REMICADE) 100 mg injection as directed Intravenous      levothyroxine (SYNTHROID) 100 MCG tablet Take 100 mcg by mouth before breakfast.      omeprazole (PRILOSEC) 40 MG capsule Take 40 mg by mouth 2 (two) times daily.      [DISCONTINUED] lisinopriL (PRINIVIL,ZESTRIL) 5 MG tablet Take 5 mg by mouth once daily.      [DISCONTINUED] rosuvastatin (CRESTOR) 10 MG tablet Take 10 mg by mouth every other day.      estradioL (ESTRACE) 1 MG tablet Take 1 tablet (1 mg total) by mouth once daily. 90 tablet 3    metoprolol succinate (TOPROL-XL) 25 MG 24 hr tablet Take 25 mg by mouth once daily.      [DISCONTINUED] ipratropium (ATROVENT) 21 mcg (0.03 %) nasal spray 2 sprays by Each Nostril route 2 (two) times daily. 30 mL 0    [DISCONTINUED]  "meloxicam (MOBIC) 15 MG tablet TAKE 1 TABLET BY MOUTH EVERY DAY AS NEEDED FOR PAIN (Patient not taking: Reported on 11/18/2024) 30 tablet 2    [DISCONTINUED] diphenhydrAMINE injection 25 mg       [DISCONTINUED] diphenhydrAMINE injection 25 mg       [DISCONTINUED] diphenhydrAMINE injection 25 mg        No facility-administered encounter medications on file as of 11/18/2024.       Allergies:  Review of patient's allergies indicates:  No Known Allergies    Health Maintenance:    There is no immunization history on file for this patient.   Health Maintenance   Topic Date Due    TETANUS VACCINE  Never done    Mammogram  Never done    Shingles Vaccine (1 of 2) Never done    Colorectal Cancer Screening  01/17/2025    High Dose Statin  11/23/2025    DEXA Scan  02/12/2027    Lipid Panel  11/22/2029    Hepatitis C Screening  Completed        Physical Exam      Vital Signs  Temp: 99.1 °F (37.3 °C)  Temp Source: Oral (last took tylenol at 730 AM this morning)  Pulse: 93  Resp: 17  SpO2: 97 %  BP: 124/80  Pain Score: 0-No pain  Height and Weight  Height: 5' 8" (172.7 cm)  Weight: 79.4 kg (175 lb)  BSA (Calculated - sq m): 1.95 sq meters  BMI (Calculated): 26.6  Weight in (lb) to have BMI = 25: 164.1]    Physical Exam  Vitals and nursing note reviewed.   Constitutional:       General: She is not in acute distress.     Appearance: She is well-developed.   HENT:      Head: Normocephalic.      Right Ear: Hearing normal.      Left Ear: Hearing normal.      Nose: Nose normal.   Eyes:      General: Lids are normal.      Conjunctiva/sclera: Conjunctivae normal.   Cardiovascular:      Rate and Rhythm: Normal rate and regular rhythm.      Heart sounds: Normal heart sounds.   Pulmonary:      Effort: Pulmonary effort is normal.      Breath sounds: Normal breath sounds.   Musculoskeletal:         General: Normal range of motion.      Cervical back: Normal range of motion and neck supple.   Skin:     General: Skin is warm and dry. "   Neurological:      Mental Status: She is alert and oriented to person, place, and time.      Gait: Gait is intact.   Psychiatric:         Behavior: Behavior is cooperative.          Laboratory:  CBC:  Recent Labs   Lab 11/20/24  1353 11/21/24  1732 11/22/24  0603   WBC 8.14 9.76 10.26   RBC 3.54 L 3.64 L 3.07 L   Hemoglobin 11.0 L 11.1 L 9.3 L   Hematocrit 33.4 L 33.6 L 28.3 L   Platelet Count 401 H 401 H 349   MCV 94.4 92.3 92.2   MCH 31.1 H 30.5 30.3   MCHC 32.9 33.0 32.9     CMP:  Recent Labs   Lab 11/20/24  1353 11/21/24  1732 11/22/24  0603   Glucose 99 119 H 104   Calcium 8.6 8.8 8.3 L   Albumin 3.4 3.2 L 2.9 L   Total Protein 7.4 7.6 6.7   Sodium 134 L 131 L 132 L   Potassium 3.8 4.2 3.6   CO2 25 23 22 L   Chloride 100 98 100   BUN 12 15 12   Alk Phos 95 115 102   ALT 22 35 34   AST 28 41 H 43 H   Bilirubin, Total 0.4 0.6 0.6     LIPIDS:  Recent Labs   Lab 12/21/21  0941 11/21/24 2206 11/22/24  0603   TSH 3.600 1.952  --    HDL Cholesterol  --   --  39   Cholesterol  --   --  145   Triglycerides  --   --  92   LDL Calculated  --   --  88   Cholesterol/HDL Ratio (Risk Factor)  --   --  3.7   Non-HDL  --   --  106     TSH:  Recent Labs   Lab 12/21/21  0941 11/21/24 2206   TSH 3.600 1.952     A1C:        Assessment/Plan     Isabella Kelly is a 69 y.o.female with:     1. Medication side effects    2. Fever, unspecified fever cause  -     POCT Influenza A/B Molecular       Flu negative in clinic; Reports Covid negative at home  Recent injection for her UC- likely causing her symptoms today      Total time spent face-to-face and non-face-to-face coordinating care for this encounter was: 15 minutes     Chronic conditions status updated as per HPI.  Other than changes above, cont current medications and maintain follow up with specialists.  Return to clinic prn if symptoms worsen or fail to improve.    SAUL Walsh  [facility]  Answers submitted by the patient for this visit:  Fever  Questionnaire (Submitted on 11/17/2024)  Chief Complaint: Fever  Chronicity: recurrent  Onset: in the past 7 days  Frequency: 2 to 4 times per day  Progression since onset: unchanged  Max temp prior to arrival: 102 to 102.9 F  Temperature source: an oral thermometer  muscle aches: Yes  sleepiness: No  Treatments tried: NSAIDs, acetaminophen, fluids  Improvement on treatment: moderate

## 2024-12-03 ENCOUNTER — PATIENT MESSAGE (OUTPATIENT)
Dept: GASTROENTEROLOGY | Facility: CLINIC | Age: 69
End: 2024-12-03
Payer: MEDICARE

## 2024-12-04 DIAGNOSIS — K51.919 ULCERATIVE COLITIS WITH COMPLICATION, UNSPECIFIED LOCATION: Primary | ICD-10-CM

## 2024-12-10 ENCOUNTER — OFFICE VISIT (OUTPATIENT)
Dept: CARDIOLOGY | Facility: CLINIC | Age: 69
End: 2024-12-10
Payer: MEDICARE

## 2024-12-10 ENCOUNTER — HOSPITAL ENCOUNTER (OUTPATIENT)
Dept: RADIOLOGY | Facility: HOSPITAL | Age: 69
Discharge: HOME | End: 2024-12-10
Attending: NURSE PRACTITIONER
Payer: MEDICARE

## 2024-12-10 VITALS
HEIGHT: 68 IN | WEIGHT: 166 LBS | BODY MASS INDEX: 25.16 KG/M2 | DIASTOLIC BLOOD PRESSURE: 82 MMHG | SYSTOLIC BLOOD PRESSURE: 124 MMHG | OXYGEN SATURATION: 94 % | HEART RATE: 90 BPM

## 2024-12-10 DIAGNOSIS — I10 PRIMARY HYPERTENSION: Primary | ICD-10-CM

## 2024-12-10 DIAGNOSIS — R06.02 SOB (SHORTNESS OF BREATH) ON EXERTION: ICD-10-CM

## 2024-12-10 DIAGNOSIS — I25.10 CAD S/P PERCUTANEOUS CORONARY ANGIOPLASTY: ICD-10-CM

## 2024-12-10 DIAGNOSIS — I21.4 NSTEMI (NON-ST ELEVATED MYOCARDIAL INFARCTION): ICD-10-CM

## 2024-12-10 DIAGNOSIS — Z98.61 CAD S/P PERCUTANEOUS CORONARY ANGIOPLASTY: ICD-10-CM

## 2024-12-10 DIAGNOSIS — E78.5 HYPERLIPIDEMIA, UNSPECIFIED HYPERLIPIDEMIA TYPE: ICD-10-CM

## 2024-12-10 PROCEDURE — 99215 OFFICE O/P EST HI 40 MIN: CPT | Mod: PBBFAC | Performed by: NURSE PRACTITIONER

## 2024-12-10 PROCEDURE — 99999 PR PBB SHADOW E&M-EST. PATIENT-LVL V: CPT | Mod: PBBFAC,,, | Performed by: NURSE PRACTITIONER

## 2024-12-10 PROCEDURE — 71046 X-RAY EXAM CHEST 2 VIEWS: CPT | Mod: TC

## 2024-12-10 PROCEDURE — 71046 X-RAY EXAM CHEST 2 VIEWS: CPT | Mod: 26,,, | Performed by: RADIOLOGY

## 2024-12-10 NOTE — PROGRESS NOTES
PCP: Edy Villalpando DO    Referring Provider:     Subjective:   Isabella Kelly is a 69 y.o. female with hx of HTN, HLD, hypothyroidism, GERD, ulcerative colitis, IBS, depression who presents for hospital discharge follow-up.    Patient was recently hospitalized for NSTEMI s/p C with PCI to mid LAD.  Echo showed EF of 60%.  Patient was discharged on DAPT with ASA and Brilinta.    Today, EKG shows NSR.  She denies any chest pain.  She was recently diagnosed with pneumonia (treated at [facility]) and states she has shortness of breath with exertion but attributes this to the pneumonia.  No other cardiac complaints.        Fhx: unknown  Shx:  Never smoker, no EtOH or drug use    EKG   Results for orders placed or performed during the hospital encounter of 11/21/24   EKG 12-lead    Collection Time: 11/22/24  7:41 AM   Result Value Ref Range    QRS Duration 92 ms    [facility] QTC Calculation 452 ms    Narrative    Test Reason : I21.4,    Vent. Rate :  95 BPM     Atrial Rate :  95 BPM     P-R Int : 186 ms          QRS Dur :  92 ms      QT Int : 360 ms       P-R-T Axes :  44  -1  26 degrees    QTcB Int : 452 ms    Normal sinus rhythm  Cannot rule out Anterior infarct ,age undetermined  Abnormal ECG  No previous ECGs available  Confirmed by Hugo Badillo ([id number]) on 11/26/2024 4:50:02 AM    Referred By: AAAREFERRAL SELF           Confirmed By: Hugo Badillo     ECHO Results for orders placed during the hospital encounter of 11/21/24    Echo    Interpretation Summary    Left Ventricle: The left ventricle is normal in size. Mildly increased wall thickness. There is concentric remodeling. There is normal systolic function. Biplane (2D) method of discs ejection fraction is 60%. There is normal diastolic function.    Right Ventricle: Normal right ventricular cavity size. Systolic function is normal.    Tricuspid Valve: There is mild regurgitation.    Pulmonary Artery: The estimated pulmonary artery systolic pressure is 23 mmHg.     IVC/SVC: Normal venous pressure at 3 mmHg.    [facility] Results for orders placed during the hospital encounter of 11/21/24    Cardiac catheterization    Conclusion    The Mid LAD lesion was 70% stenosed with 0% stenosis post-intervention.    The ejection fraction was calculated to be 65%.    The left ventricular systolic function was normal.    The left ventricular end diastolic pressure was normal.    The pre-procedure left ventricular end diastolic pressure was 6.    Mid LAD lesion: A stent was successfully placed.    The estimated blood loss was none.    There was single vessel coronary artery disease.    The procedure log was documented by Documenter: Kassidy Romeo RN and verified by Raphael Chaves DO.    Date: 11/22/2024  Time: 4:29 PM    Normal LV systolic function, est EF 65%  Severe single vessel disease undergoing successful PCI with ERASMO mid LAD        Lab Results   Component Value Date     (L) 11/22/2024    K 3.6 11/22/2024     11/22/2024    CO2 22 (L) 11/22/2024    BUN 12 11/22/2024    CREATININE 0.72 11/22/2024    CALCIUM 8.3 (L) 11/22/2024    ANIONGAP 14 11/22/2024    ESTGFRAFRICA 74 05/20/2020    EGFRNONAA 89 06/21/2022       Lab Results   Component Value Date    CHOL 145 11/22/2024     Lab Results   Component Value Date    HDL 39 11/22/2024     Lab Results   Component Value Date    LDLCALC 88 11/22/2024     Lab Results   Component Value Date    TRIG 92 11/22/2024     Lab Results   Component Value Date    CHOLHDL 3.7 11/22/2024       Lab Results   Component Value Date    WBC 10.26 11/22/2024    HGB 9.3 (L) 11/22/2024    HCT 28.3 (L) 11/22/2024    MCV 92.2 11/22/2024     11/22/2024           Current Outpatient Medications:     aspirin 81 MG Chew, Take 1 tablet (81 mg total) by mouth once daily., Disp: 30 tablet, Rfl: 11    atorvastatin (LIPITOR) 80 MG tablet, Take 1 tablet (80 mg total) by mouth once daily., Disp: 30 tablet, Rfl: 2    estradioL (ESTRACE) 1 MG tablet, Take 1 tablet (1 mg  "total) by mouth once daily., Disp: 90 tablet, Rfl: 3    levothyroxine (SYNTHROID) 100 MCG tablet, Take 100 mcg by mouth before breakfast., Disp: , Rfl:     lisinopriL (PRINIVIL,ZESTRIL) 5 MG tablet, Take 1 tablet (5 mg total) by mouth once daily., Disp: 30 tablet, Rfl: 2    metoprolol succinate (TOPROL-XL) 25 MG 24 hr tablet, Take 25 mg by mouth once daily., Disp: , Rfl:     nitroGLYCERIN (NITROSTAT) 0.4 MG SL tablet, Place 1 tablet (0.4 mg total) under the tongue every 5 (five) minutes as needed for Chest pain (for a max of 3 tabs in 15 minutes)., Disp: 25 tablet, Rfl: 4    omeprazole (PRILOSEC) 40 MG capsule, Take 40 mg by mouth 2 (two) times daily., Disp: , Rfl:     risankizumab-rzaa 180 mg/1.2 mL (150 mg/mL) Injt, Inject 180 mg into the skin every 8 weeks. for 6 doses, Disp: 1.2 mL, Rfl: 5    ticagrelor (BRILINTA) 90 mg tablet, Take 1 tablet (90 mg total) by mouth 2 (two) times daily., Disp: 180 tablet, Rfl: 3    inFLIXimab (REMICADE) 100 mg injection, as directed Intravenous (Patient not taking: Reported on 12/10/2024), Disp: , Rfl:     Review of Systems   Constitutional:  Negative for chills, diaphoresis, fever and malaise/fatigue.   Respiratory:  Negative for cough and shortness of breath.    Cardiovascular:  Negative for chest pain, palpitations, orthopnea, leg swelling and PND.   Gastrointestinal:  Negative for abdominal pain, nausea and vomiting.   Musculoskeletal:  Negative for falls.   Neurological:  Negative for focal weakness and weakness.         Objective:   /82 (BP Location: Left arm)   Pulse 90   Ht 5' 8" (1.727 m)   Wt 75.3 kg (166 lb)   SpO2 (!) 94%   BMI 25.24 kg/m²     Physical Exam  Constitutional:       General: She is not in acute distress.     Appearance: Normal appearance.   Cardiovascular:      Rate and Rhythm: Normal rate and regular rhythm.   Pulmonary:      Effort: Pulmonary effort is normal.      Breath sounds: Normal breath sounds.   Musculoskeletal:      Cervical back: " Neck supple. No rigidity.      Right lower leg: No edema.      Left lower leg: No edema.   Skin:     General: Skin is warm and dry.   Neurological:      Mental Status: She is alert.           Assessment:     1. Primary hypertension  EKG 12-lead      2. NSTEMI (non-ST elevated myocardial infarction)        3. Hyperlipidemia, unspecified hyperlipidemia type        4. CAD S/P percutaneous coronary angioplasty        5. SOB (shortness of breath) on exertion  X-Ray Chest PA And Lateral            Plan:   Hyperlipidemia  Continue Lipitor 80 mg q.h.s.    Primary hypertension  Well-controlled on current meds    NSTEMI (non-ST elevated myocardial infarction)  S/p [facility] with PCI to mid LAD  Continue ASA, Brilinta, Lipitor, metoprolol and lisinopril  Enrolled in cardiac rehab, awaiting approval to begin program    CAD S/P percutaneous coronary angioplasty  S/p [facility] with PCI to mid LAD  Denies CP, has c/o SOB on exertion, recently treated for pneumonia  Continue ASA, Brilinta, atorvastatin, metoprolol and lisinopril   Awaiting approval to begin cardiac rehab     SOB (shortness of breath) on exertion  EF 60%  No edema, orthopnea or PND  Recently treated at [facility] for pneumonia   Coarse lung sounds  Productive cough  Chest xray today         Follow up with Dr. Chaves in 1 month

## 2024-12-10 NOTE — ASSESSMENT & PLAN NOTE
S/p [facility] with PCI to mid LAD  Continue ASA, Brilinta, Lipitor, metoprolol and lisinopril  Enrolled in cardiac rehab, awaiting approval to begin program

## 2024-12-10 NOTE — ASSESSMENT & PLAN NOTE
EF 60%  No edema, orthopnea or PND  Recently treated at [facility] for pneumonia   Coarse lung sounds  Productive cough  Chest xray today

## 2024-12-10 NOTE — ASSESSMENT & PLAN NOTE
S/p C with PCI to mid LAD  Denies CP, has c/o SOB on exertion, recently treated for pneumonia  Continue ASA, Brilinta, atorvastatin, metoprolol and lisinopril   Awaiting approval to begin cardiac rehab

## 2024-12-11 ENCOUNTER — TELEPHONE (OUTPATIENT)
Dept: CARDIOLOGY | Facility: CLINIC | Age: 69
End: 2024-12-11
Payer: MEDICARE

## 2024-12-11 NOTE — TELEPHONE ENCOUNTER
----- Message from SAUL Ram sent at 12/11/2024  8:39 AM CST -----  Please call and let her know her chest xray looks good, no pneumonia. Thank you :)    Notified pt.

## 2024-12-17 DIAGNOSIS — K51.919 ULCERATIVE COLITIS WITH COMPLICATION, UNSPECIFIED LOCATION: Primary | ICD-10-CM

## 2024-12-17 RX ORDER — SODIUM CHLORIDE 0.9 % (FLUSH) 0.9 %
10 SYRINGE (ML) INJECTION
OUTPATIENT
Start: 2024-12-17

## 2024-12-17 RX ORDER — DIPHENHYDRAMINE HYDROCHLORIDE 50 MG/ML
50 INJECTION INTRAMUSCULAR; INTRAVENOUS ONCE AS NEEDED
OUTPATIENT
Start: 2024-12-17

## 2024-12-17 RX ORDER — EPINEPHRINE 0.3 MG/.3ML
0.3 INJECTION SUBCUTANEOUS ONCE AS NEEDED
OUTPATIENT
Start: 2024-12-17

## 2024-12-17 RX ORDER — METHYLPREDNISOLONE SOD SUCC 125 MG
125 VIAL (EA) INJECTION
OUTPATIENT
Start: 2024-12-17

## 2024-12-17 RX ORDER — HEPARIN 100 UNIT/ML
500 SYRINGE INTRAVENOUS
OUTPATIENT
Start: 2024-12-17

## 2024-12-27 DIAGNOSIS — I21.4 NON-ST ELEVATION MYOCARDIAL INFARCTION (NSTEMI): Primary | ICD-10-CM

## 2025-01-03 ENCOUNTER — ANESTHESIA (OUTPATIENT)
Dept: GASTROENTEROLOGY | Facility: HOSPITAL | Age: 70
End: 2025-01-03
Payer: MEDICARE

## 2025-01-03 ENCOUNTER — ANESTHESIA EVENT (OUTPATIENT)
Dept: GASTROENTEROLOGY | Facility: HOSPITAL | Age: 70
End: 2025-01-03
Payer: MEDICARE

## 2025-01-03 ENCOUNTER — HOSPITAL ENCOUNTER (OUTPATIENT)
Dept: GASTROENTEROLOGY | Facility: HOSPITAL | Age: 70
Discharge: HOME | End: 2025-01-03
Attending: INTERNAL MEDICINE | Admitting: INTERNAL MEDICINE
Payer: MEDICARE

## 2025-01-03 VITALS
HEIGHT: 68 IN | OXYGEN SATURATION: 98 % | SYSTOLIC BLOOD PRESSURE: 147 MMHG | TEMPERATURE: 97 F | HEART RATE: 73 BPM | DIASTOLIC BLOOD PRESSURE: 89 MMHG | RESPIRATION RATE: 13 BRPM | BODY MASS INDEX: 24.71 KG/M2 | WEIGHT: 163 LBS

## 2025-01-03 DIAGNOSIS — D50.0 IRON DEFICIENCY ANEMIA DUE TO CHRONIC BLOOD LOSS: Primary | ICD-10-CM

## 2025-01-03 DIAGNOSIS — D12.2 ADENOMATOUS POLYP OF ASCENDING COLON: ICD-10-CM

## 2025-01-03 DIAGNOSIS — Z86.0100 HISTORY OF COLON POLYPS: ICD-10-CM

## 2025-01-03 DIAGNOSIS — K57.30 COLON, DIVERTICULOSIS: ICD-10-CM

## 2025-01-03 DIAGNOSIS — K51.219 ULCERATIVE PROCTITIS WITH COMPLICATION: ICD-10-CM

## 2025-01-03 PROCEDURE — 37000009 HC ANESTHESIA EA ADD 15 MINS

## 2025-01-03 PROCEDURE — 63600175 PHARM REV CODE 636 W HCPCS

## 2025-01-03 PROCEDURE — 45378 DIAGNOSTIC COLONOSCOPY: CPT | Mod: ,,, | Performed by: INTERNAL MEDICINE

## 2025-01-03 PROCEDURE — 27000284 HC CANNULA NASAL

## 2025-01-03 PROCEDURE — 37000008 HC ANESTHESIA 1ST 15 MINUTES

## 2025-01-03 PROCEDURE — 45378 DIAGNOSTIC COLONOSCOPY: CPT | Performed by: INTERNAL MEDICINE

## 2025-01-03 RX ORDER — SODIUM CHLORIDE, SODIUM LACTATE, POTASSIUM CHLORIDE, CALCIUM CHLORIDE 600; 310; 30; 20 MG/100ML; MG/100ML; MG/100ML; MG/100ML
INJECTION, SOLUTION INTRAVENOUS CONTINUOUS
Status: DISCONTINUED | OUTPATIENT
Start: 2025-01-03 | End: 2025-01-04 | Stop reason: HOSPADM

## 2025-01-03 RX ORDER — LIDOCAINE HYDROCHLORIDE 20 MG/ML
INJECTION, SOLUTION EPIDURAL; INFILTRATION; INTRACAUDAL; PERINEURAL
Status: DISCONTINUED | OUTPATIENT
Start: 2025-01-03 | End: 2025-01-03

## 2025-01-03 RX ORDER — PROPOFOL 10 MG/ML
VIAL (ML) INTRAVENOUS
Status: DISCONTINUED | OUTPATIENT
Start: 2025-01-03 | End: 2025-01-03

## 2025-01-03 RX ORDER — SODIUM CHLORIDE 0.9 % (FLUSH) 0.9 %
10 SYRINGE (ML) INJECTION EVERY 6 HOURS PRN
Status: DISCONTINUED | OUTPATIENT
Start: 2025-01-03 | End: 2025-01-04 | Stop reason: HOSPADM

## 2025-01-03 RX ORDER — ONDANSETRON HYDROCHLORIDE 2 MG/ML
INJECTION, SOLUTION INTRAVENOUS
Status: DISCONTINUED | OUTPATIENT
Start: 2025-01-03 | End: 2025-01-03

## 2025-01-03 RX ADMIN — LIDOCAINE HYDROCHLORIDE 80 MG: 20 INJECTION, SOLUTION INTRAVENOUS at 10:01

## 2025-01-03 RX ADMIN — PROPOFOL 50 MG: 10 INJECTION, EMULSION INTRAVENOUS at 10:01

## 2025-01-03 RX ADMIN — ONDANSETRON 4 MG: 2 INJECTION INTRAMUSCULAR; INTRAVENOUS at 09:01

## 2025-01-03 RX ADMIN — PROPOFOL 30 MG: 10 INJECTION, EMULSION INTRAVENOUS at 10:01

## 2025-01-03 NOTE — H&P
[facility] - Endoscopy  Gastroenterology  H&P    Patient Name: Isabella Kelly  MRN: [id number]  Admission Date: 1/3/2025  Code Status: Prior    Attending Provider: Chris Estevez MD   Primary Care Physician: Edy Villalpando DO  Principal Problem:<principal problem not specified>    Subjective:     History of Present Illness:  this patient is a 69-year-old female with ulcerative colitis.  Her last colonoscopy was earlier this year.  She has been on Remicade and she has developed anemia with a increased lower GI symptoms.  She has iron-deficiency.   Unfortunately, she had a recent MI and has to stop taking Remicade.  Past Medical History:   Diagnosis Date    Acquired hypothyroidism 01/13/2023    CAD S/P percutaneous coronary angioplasty 11/22/2024    Depressive disorder 09/2004    mild    Esophageal reflux     Heart attack 11/22/2024    Hyperlipidemia 10/26/2011    mile;  TG  71 mg/dl;  HDL  67 mg/dl;  LDL  136 mg/dl;  total cholesterol  217 mg/dl.  10/13/2017:  LDL  163 mg/dl;  total cholesterol  260 mg/dl; triglycerides  186 mg/dl;  HDL  66 mg/dl    Hypertension     IBD (inflammatory bowel disease)     Iron deficiency anemia due to chronic blood loss 12/19/2022    PONV (postoperative nausea and vomiting)     Post-hysterectomy menopause     Stress incontinence     Ulcerative colitis 08/2020    Ulcerative colitis confirmed by colonoscopy by Dr. Ho Capellan    Uterine prolapse        Past Surgical History:   Procedure Laterality Date    ANGIOGRAM, CORONARY, WITH LEFT HEART CATHETERIZATION N/A 11/22/2024    Procedure: Angiogram, Coronary, with Left Heart Cath;  Surgeon: Raphael Chaves DO;  Location: [facility] CATH LAB;  Service: Cardiology;  Laterality: N/A;    anterior/posterior colporrhaphy with vaginal enterocele repair  06/18/2008    APPENDECTOMY      COLONOSCOPY      DILATION AND CURETTAGE OF UTERUS      [id number]    ENDOMETRIAL BIOPSY  02/16/2006    ENDOMETRIAL BIOPSY REPEAT   11/06/2007    heart  stent  11/22/2024    HYSTEROSCOPY  11/28/2007    PERCUTANEOUS CORONARY INTERVENTION, ARTERY N/A 11/22/2024    Procedure: Percutaneous coronary intervention;  Surgeon: Raphael Chaves DO;  Location: [facility] CATH LAB;  Service: Cardiology;  Laterality: N/A;    SILVER NITRATE CAUTERY OF VAGINAL WALL  08/12/2008    SIS  11/15/2007    TOT SLING  06/18/2008    TOTAL ABDOMINAL HYSTERECTOMY         Review of patient's allergies indicates:  No Known Allergies  Family History    Family history is unknown by patient.       Tobacco Use    Smoking status: Never    Smokeless tobacco: Never   Substance and Sexual Activity    Alcohol use: Never    Drug use: Never    Sexual activity: Yes     Partners: Male     Birth control/protection: See Surgical Hx     Review of Systems   Constitutional:  Negative for activity change.   Respiratory: Negative.     Cardiovascular: Negative.    Gastrointestinal:  Negative for diarrhea.   Musculoskeletal:  Positive for arthralgias.     Objective:     Vital Signs (Most Recent):  Temp: 97.8 °F (36.6 °C) (01/03/25 0929)  Pulse: 81 (01/03/25 0929)  Resp: 18 (01/03/25 0929)  BP: 118/76 (01/03/25 0929)  SpO2: 97 % (01/03/25 0929) Vital Signs (24h Range):  Temp:  [97.8 °F (36.6 °C)] 97.8 °F (36.6 °C)  Pulse:  [81] 81  Resp:  [18] 18  SpO2:  [97 %] 97 %  BP: (118)/(76) 118/76     Weight: 73.9 kg (163 lb) (01/03/25 0929)  Body mass index is 24.78 kg/m².    No intake or output data in the 24 hours ending 01/03/25 0956    Lines/Drains/Airways       Peripheral Intravenous Line  Duration                  Peripheral IV - Single Lumen 01/03/25 0930 22 G Right Antecubital <1 day                    Physical Exam  Vitals reviewed.   Constitutional:       General: She is not in acute distress.     Appearance: Normal appearance. She is well-developed. She is not ill-appearing.   HENT:      Head: Normocephalic and atraumatic.      Nose: Nose normal.   Eyes:      Pupils: Pupils are equal, round, and reactive to light.  "  Cardiovascular:      Rate and Rhythm: Normal rate and regular rhythm.   Pulmonary:      Effort: Pulmonary effort is normal.      Breath sounds: Normal breath sounds. No wheezing.   Abdominal:      General: Abdomen is flat. Bowel sounds are normal. There is no distension.      Palpations: Abdomen is soft.      Tenderness: There is no abdominal tenderness. There is no guarding.   Skin:     General: Skin is warm and dry.      Coloration: Skin is not jaundiced.   Neurological:      Mental Status: She is alert.   Psychiatric:         Attention and Perception: Attention normal.         Mood and Affect: Affect normal.         Speech: Speech normal.         Behavior: Behavior is cooperative.      Comments: Pt was calm while speaking.         Significant Labs:  CBC:   Recent Labs   Lab 01/02/25  0947   WBC 7.50   HGB 11.2*   HCT 35.1*        CMP: No results for input(s): "GLU", "CALCIUM", "ALBUMIN", "PROT", "NA", "K", "CO2", "CL", "BUN", "CREATININE", "ALKPHOS", "ALT", "AST", "BILITOT" in the last 48 hours.    Significant Imaging:  Imaging results within the past 24 hours have been reviewed.    Assessment/Plan:     There are no hospital problems to display for this patient.          Impression: Ulcerative colitis, history of colon polyp, iron-deficiency anemia   Plan: Colonoscopy today    Chris Estevez MD  Gastroenterology  [facility] - Endoscopy  "

## 2025-01-03 NOTE — DISCHARGE INSTRUCTIONS
Procedure Date  1/3/25     Impression  Overall Impression:   Diverticulosis in the descending colon and sigmoid colon   Digital rectal exam revealed no mass.  The colon was examined from the rectum to the cecum and no inflammation was seen.  The rectum mucosa was scarred consistent with controlled ulcerative colitis.  Diverticula were present in the descending and sigmoid colon.  Diminutive pseudopolyps were present in the descending colon. No colon  biopsies were obtained due to Brilinta therapy.    Impression:  Controlled ulcerative colitis, colon diverticulosis, iron-deficiency anemia, pseudopolyps.  History of colon polyps.     Recommendation  Repeat colonoscopy in 2 years        Disposition  :  discharge to home in stable condition.  Resume diet.  No driving until tomorrow.  Follow up in GI clinic with anticipation of Skyrizi therapy.  Avoid nonsteroidal anti-inflammatories.  Start ferrous sulfate 325 mg per day for iron-deficiency anemia.     NO DRIVING, OPERATING EQUIPMENT, OR SIGNING LEGAL DOCUMENTS FOR 24 HOURS.THE NURSE WILL CALL YOU WITH YOUR BIOPSY RESULTS IN A FEW DAYS. IF YOU HAVE  [facility] MYCHART YOUR RESULTS WILL APPEAR THERE AS WELL.Please call the GI Lab if you have any nausea, vomiting, or abdominal pain.

## 2025-01-03 NOTE — TRANSFER OF CARE
"Anesthesia Transfer of Care Note    Patient: Isabella Kelly    Procedure(s) Performed: * Colonoscopy *    Patient location: GI    Anesthesia Type: MAC    Transport from OR: Transported from OR on room air with adequate spontaneous ventilation. Continuous ECG monitoring in transport. Continuous SpO2 monitoring in transport    Post pain: adequate analgesia    Post assessment: no apparent anesthetic complications    Post vital signs: stable    Level of consciousness: sedated and responds to stimulation    Nausea/Vomiting: no nausea/vomiting    Complications: none    Transfer of care protocol was followedComments: Good SV continue, NAD, VSS, RTRN      Last vitals: Visit Vitals  BP 99/62 (BP Location: Left arm, Patient Position: Lying)   Pulse 84   Temp 36.1 °C (97 °F) (Skin)   Resp 16   Ht 5' 8" (1.727 m)   Wt 73.9 kg (163 lb)   SpO2 100%   Breastfeeding No   BMI 24.78 kg/m²     "

## 2025-01-03 NOTE — ANESTHESIA PREPROCEDURE EVALUATION
01/03/2025  Isabella Kelly is a 69 y.o., female.  Current Outpatient Medications on File Prior to Encounter   Medication Sig Dispense Refill    aspirin 81 MG Chew Take 1 tablet (81 mg total) by mouth once daily. 30 tablet 11    atorvastatin (LIPITOR) 80 MG tablet Take 1 tablet (80 mg total) by mouth once daily. 30 tablet 2    estradioL (ESTRACE) 1 MG tablet Take 1 tablet (1 mg total) by mouth once daily. 90 tablet 3    inFLIXimab (REMICADE) 100 mg injection as directed Intravenous (Patient not taking: Reported on 12/10/2024)      levothyroxine (SYNTHROID) 100 MCG tablet Take 100 mcg by mouth before breakfast.      lisinopriL (PRINIVIL,ZESTRIL) 5 MG tablet Take 1 tablet (5 mg total) by mouth once daily. 30 tablet 2    metoprolol succinate (TOPROL-XL) 25 MG 24 hr tablet Take 25 mg by mouth once daily.      nitroGLYCERIN (NITROSTAT) 0.4 MG SL tablet Place 1 tablet (0.4 mg total) under the tongue every 5 (five) minutes as needed for Chest pain (for a max of 3 tabs in 15 minutes). 25 tablet 4    omeprazole (PRILOSEC) 40 MG capsule Take 40 mg by mouth 2 (two) times daily.      risankizumab-rzaa 180 mg/1.2 mL (150 mg/mL) Injt Inject 180 mg into the skin every 8 weeks. for 6 doses 1.2 mL 5    ticagrelor (BRILINTA) 90 mg tablet Take 1 tablet (90 mg total) by mouth 2 (two) times daily. 180 tablet 3     No current facility-administered medications on file prior to encounter.     Active Ambulatory Problems     Diagnosis Date Noted    Primary osteoarthritis of right knee 07/19/2021    History of total right knee replacement 10/13/2021    Greater trochanteric bursitis of left hip 06/06/2022    H/O total knee replacement, left 10/14/2022    Ulcerative colitis with complication 12/12/2022    Iron deficiency anemia 12/19/2022    Hypothyroidism 01/13/2023    Anxiety 01/13/2023    Chronic left-sided low back pain without  sciatica 01/13/2023    Chronic ulcerative rectosigmoiditis 01/13/2023    Diverticulosis of colon 01/13/2023    Hyperlipidemia 01/13/2023    Hormone replacement therapy 01/13/2023    Primary hypertension 01/13/2023    Arthritis 01/13/2023    Polyp of transverse colon 01/17/2024    Adenomatous polyp of ascending colon 01/17/2024    Other intestinal malabsorption 09/25/2024    NSTEMI (non-ST elevated myocardial infarction) 11/22/2024    Sinus tachycardia 11/22/2024    CAD S/P percutaneous coronary angioplasty 11/22/2024    SOB (shortness of breath) on exertion 12/10/2024     Resolved Ambulatory Problems     Diagnosis Date Noted    Postop check 09/15/2021    Non-healing wound of left lower extremity 12/12/2022    Ulcerative pancolitis with rectal bleeding 12/19/2022    Encounter for general adult medical examination without abnormal findings 01/13/2023    Neck sprain 01/13/2023     Past Medical History:   Diagnosis Date    Acquired hypothyroidism 01/13/2023    Depressive disorder 09/2004    Esophageal reflux     Hypertension     IBD (inflammatory bowel disease)     Iron deficiency anemia due to chronic blood loss 12/19/2022    Post-hysterectomy menopause     Stress incontinence     Ulcerative colitis 08/2020    Uterine prolapse      Past Surgical History:   Procedure Laterality Date    ANGIOGRAM, CORONARY, WITH LEFT HEART CATHETERIZATION N/A 11/22/2024    Procedure: Angiogram, Coronary, with Left Heart Cath;  Surgeon: Raphael Chaves DO;  Location: [facility] CATH LAB;  Service: Cardiology;  Laterality: N/A;    anterior/posterior colporrhaphy with vaginal enterocele repair  06/18/2008    APPENDECTOMY      COLONOSCOPY      DILATION AND CURETTAGE OF UTERUS      [id number]    ENDOMETRIAL BIOPSY  02/16/2006    ENDOMETRIAL BIOPSY REPEAT   11/06/2007    HYSTEROSCOPY  11/28/2007    PERCUTANEOUS CORONARY INTERVENTION, ARTERY N/A 11/22/2024    Procedure: Percutaneous coronary intervention;  Surgeon: Raphael Chaves DO;  Location:  [facility] CATH LAB;  Service: Cardiology;  Laterality: N/A;    SILVER NITRATE CAUTERY OF VAGINAL WALL  08/12/2008    SIS  11/15/2007    TOT SLING  06/18/2008    TOTAL ABDOMINAL HYSTERECTOMY           Pre-op Assessment    I have reviewed the Patient Summary Reports.     I have reviewed the Nursing Notes. I have reviewed the NPO Status.   I have reviewed the Medications.     Review of Systems  Anesthesia Hx:  No problems with previous Anesthesia             Denies Family Hx of Anesthesia complications.   Personal Hx of Anesthesia complications, Post-Operative Nausea/Vomiting                    Social:  Non-Smoker, No Alcohol Use       Hematology/Oncology:    Oncology Normal    -- Anemia:                                  EENT/Dental:  EENT/Dental Normal           Cardiovascular:  Exercise tolerance: good   Hypertension  Past MI CAD   CABG/stent        hyperlipidemia   ECG has been reviewed.                            Pulmonary:       Denies Shortness of breath.                  Renal/:  Renal/ Normal                 Hepatic/GI:   PUD,  GERD   Ulcerative colitis              Musculoskeletal:  Arthritis               Neurological:  Neurology Normal                                      Endocrine:   Hypothyroidism          Dermatological:  Skin Normal    Psych:  Psychiatric History anxiety                 Chemistry        Component Value Date/Time     (L) 11/22/2024 0603    K 3.6 11/22/2024 0603     11/22/2024 0603    CO2 22 (L) 11/22/2024 0603    BUN 12 11/22/2024 0603    CREATININE 0.72 11/22/2024 0603     11/22/2024 0603        Component Value Date/Time    CALCIUM 8.3 (L) 11/22/2024 0603    ALKPHOS 102 11/22/2024 0603    AST 43 (H) 11/22/2024 0603    ALT 34 11/22/2024 0603    BILITOT 0.6 11/22/2024 0603    ESTGFRAFRICA 74 05/20/2020 0437    EGFRNONAA 89 06/21/2022 0938        Lab Results   Component Value Date    WBC 7.50 01/02/2025    HGB 11.2 (L) 01/02/2025    HCT 35.1 (L) 01/02/2025    MCV 95.6  01/02/2025     01/02/2025       Results for orders placed or performed in visit on 12/10/24   EKG 12-lead    Collection Time: 12/10/24  8:43 AM   Result Value Ref Range    QRS Duration 80 ms    OHS QTC Calculation 435 ms    Narrative    Test Reason : I10,    Vent. Rate :  74 BPM     Atrial Rate :  74 BPM     P-R Int : 202 ms          QRS Dur :  80 ms      QT Int : 392 ms       P-R-T Axes :  71  42  58 degrees    QTcB Int : 435 ms    Normal sinus rhythm  Normal ECG  When compared with ECG of 22-Nov-2024 07:41,  Minimal criteria for Anterior infarct are no longer Present  Confirmed by Farhan Forbes ([id number]) on 12/16/2024 1:34:41 PM    Referred By:            Confirmed By: Farhan Forbes     Results for orders placed during the hospital encounter of 11/21/24    Echo    Interpretation Summary    Left Ventricle: The left ventricle is normal in size. Mildly increased wall thickness. There is concentric remodeling. There is normal systolic function. Biplane (2D) method of discs ejection fraction is 60%. There is normal diastolic function.    Right Ventricle: Normal right ventricular cavity size. Systolic function is normal.    Tricuspid Valve: There is mild regurgitation.    Pulmonary Artery: The estimated pulmonary artery systolic pressure is 23 mmHg.    IVC/SVC: Normal venous pressure at 3 mmHg.      Physical Exam  General: Well nourished, Cooperative, Alert and Oriented    Airway:  Mallampati: II   Mouth Opening: Normal  TM Distance: Normal  Tongue: Normal  Neck ROM: Normal ROM    Dental:  Intact    Chest/Lungs:  Normal Respiratory Rate        Anesthesia Plan  Type of Anesthesia, risks & benefits discussed:    Anesthesia Type: MAC  Intra-op Monitoring Plan: Standard ASA Monitors  Post Op Pain Control Plan: multimodal analgesia  Induction:  IV  Informed Consent: Informed consent signed with the Patient and all parties understand the risks and agree with anesthesia plan.  All questions answered. Patient  consented to blood products? Yes  ASA Score: 3  Day of Surgery Review of History & Physical: H&P Update referred to the surgeon/provider.I have interviewed and examined the patient. I have reviewed the patient's H&P dated: There are no significant changes.     Ready For Surgery From Anesthesia Perspective.     .

## 2025-01-03 NOTE — ANESTHESIA POSTPROCEDURE EVALUATION
Anesthesia Post Evaluation    Patient: Isabella Kelly    Procedure(s) Performed: * Colonoscopy *    Final Anesthesia Type: MAC      Patient location during evaluation: GI PACU  Patient participation: Yes- Able to Participate  Level of consciousness: awake and alert  Post-procedure vital signs: reviewed and stable  Pain management: adequate  Airway patency: patent    PONV status at discharge: No PONV  Anesthetic complications: no      Cardiovascular status: blood pressure returned to baseline and hemodynamically stable  Respiratory status: unassisted, spontaneous ventilation and room air  Hydration status: euvolemic  Follow-up not needed.  Comments: Refer to nursing notes for pain/zen score upon discharge from recovery.              Vitals Value Taken Time   /79 01/03/25 1031   Temp 98 F 01/03/25 1032   Pulse 77 01/03/25 1031   Resp 14 01/03/25 1031   SpO2 96 % 01/03/25 1031   Vitals shown include unfiled device data.      No case tracking events are documented in the log.      Pain/Zen Score: Zen Score: 10 (1/3/2025 10:28 AM)

## 2025-01-07 ENCOUNTER — PATIENT MESSAGE (OUTPATIENT)
Dept: GASTROENTEROLOGY | Facility: CLINIC | Age: 70
End: 2025-01-07
Payer: MEDICARE

## 2025-01-07 ENCOUNTER — HOSPITAL ENCOUNTER (OUTPATIENT)
Dept: CARDIOLOGY | Facility: HOSPITAL | Age: 70
Discharge: HOME | End: 2025-01-07
Attending: INTERNAL MEDICINE
Payer: MEDICARE

## 2025-01-07 DIAGNOSIS — K51.919 ULCERATIVE COLITIS WITH COMPLICATION, UNSPECIFIED LOCATION: Primary | ICD-10-CM

## 2025-01-07 DIAGNOSIS — M19.90 INFLAMMATORY ARTHRITIS: ICD-10-CM

## 2025-01-07 DIAGNOSIS — I21.4 NON-ST ELEVATION MYOCARDIAL INFARCTION (NSTEMI): ICD-10-CM

## 2025-01-07 PROCEDURE — 93018 CV STRESS TEST I&R ONLY: CPT | Mod: ,,, | Performed by: INTERNAL MEDICINE

## 2025-01-07 PROCEDURE — 93017 CV STRESS TEST TRACING ONLY: CPT

## 2025-01-07 PROCEDURE — 93016 CV STRESS TEST SUPVJ ONLY: CPT | Mod: ,,, | Performed by: INTERNAL MEDICINE

## 2025-01-07 RX ORDER — FOLIC ACID 1 MG/1
1 TABLET ORAL DAILY
Qty: 60 TABLET | Refills: 0 | Status: SHIPPED | OUTPATIENT
Start: 2025-01-07 | End: 2025-03-08

## 2025-01-07 RX ORDER — SULFASALAZINE 500 MG/1
1000 TABLET, DELAYED RELEASE ORAL 2 TIMES DAILY
Qty: 240 TABLET | Refills: 0 | Status: SHIPPED | OUTPATIENT
Start: 2025-01-07 | End: 2025-03-08

## 2025-01-08 LAB
CV STRESS BASE HR: 82 BPM
DIASTOLIC BLOOD PRESSURE: 99 MMHG
OHS CV CPX 1 MINUTE RECOVERY HEART RATE: 86 BPM
OHS CV CPX 85 PERCENT MAX PREDICTED HEART RATE MALE: 129
OHS CV CPX ESTIMATED METS: 7
OHS CV CPX MAX PREDICTED HEART RATE: 152
OHS CV CPX PATIENT IS FEMALE: 1
OHS CV CPX PATIENT IS MALE: 0
OHS CV CPX PEAK DIASTOLIC BLOOD PRESSURE: 90 MMHG
OHS CV CPX PEAK HEAR RATE: 133 BPM
OHS CV CPX PEAK RATE PRESSURE PRODUCT: NORMAL
OHS CV CPX PEAK SYSTOLIC BLOOD PRESSURE: 169 MMHG
OHS CV CPX PERCENT MAX PREDICTED HEART RATE ACHIEVED: 91
OHS CV CPX RATE PRESSURE PRODUCT PRESENTING: NORMAL
STRESS ECHO POST EXERCISE DUR MIN: 10 MINUTES
STRESS ECHO POST EXERCISE DUR SEC: 44 SECONDS
SYSTOLIC BLOOD PRESSURE: 148 MMHG

## 2025-01-14 DIAGNOSIS — K51.919 ULCERATIVE COLITIS WITH COMPLICATION, UNSPECIFIED LOCATION: Primary | ICD-10-CM

## 2025-01-14 RX ORDER — PREDNISONE 20 MG/1
20 TABLET ORAL DAILY
Qty: 14 TABLET | Refills: 0 | Status: SHIPPED | OUTPATIENT
Start: 2025-01-14 | End: 2025-01-28

## 2025-01-14 NOTE — TELEPHONE ENCOUNTER
Patient states sulfasalazine isn't working, joint pain worsening, asking for round of steroids. Please advise.

## 2025-01-22 ENCOUNTER — OFFICE VISIT (OUTPATIENT)
Dept: CARDIOLOGY | Facility: CLINIC | Age: 70
End: 2025-01-22
Payer: MEDICARE

## 2025-01-22 VITALS
SYSTOLIC BLOOD PRESSURE: 122 MMHG | HEIGHT: 68 IN | BODY MASS INDEX: 26.83 KG/M2 | HEART RATE: 92 BPM | WEIGHT: 177 LBS | DIASTOLIC BLOOD PRESSURE: 82 MMHG | OXYGEN SATURATION: 97 %

## 2025-01-22 DIAGNOSIS — Z98.61 CAD S/P PERCUTANEOUS CORONARY ANGIOPLASTY: Primary | ICD-10-CM

## 2025-01-22 DIAGNOSIS — R06.02 SOB (SHORTNESS OF BREATH) ON EXERTION: ICD-10-CM

## 2025-01-22 DIAGNOSIS — E78.2 MIXED HYPERLIPIDEMIA: ICD-10-CM

## 2025-01-22 DIAGNOSIS — I10 PRIMARY HYPERTENSION: ICD-10-CM

## 2025-01-22 DIAGNOSIS — I25.10 CAD S/P PERCUTANEOUS CORONARY ANGIOPLASTY: Primary | ICD-10-CM

## 2025-01-22 PROCEDURE — 99999 PR PBB SHADOW E&M-EST. PATIENT-LVL IV: CPT | Mod: PBBFAC,,, | Performed by: INTERNAL MEDICINE

## 2025-01-22 PROCEDURE — 99214 OFFICE O/P EST MOD 30 MIN: CPT | Mod: S$PBB,,, | Performed by: INTERNAL MEDICINE

## 2025-01-22 PROCEDURE — 99214 OFFICE O/P EST MOD 30 MIN: CPT | Mod: PBBFAC | Performed by: INTERNAL MEDICINE

## 2025-01-22 NOTE — ASSESSMENT & PLAN NOTE
Patient with known CAD s/p PCI with ERASMO LAD, which is controlled Will continue Brillinta and monitor for S/Sx of angina/ACS.

## 2025-01-22 NOTE — PROGRESS NOTES
PCP: Edy Villalpando DO    Referring Provider:     Subjective:   Isabella Kelly is a 69 y.o. female with hx of HTN, HLD, hypothyroidism, GERD, ulcerative colitis, IBS, depression who presents for evaluation of CAD post PCI with ERASMO LAD.  Patient was recently hospitalized for NSTEMI s/p [facility] with PCI to mid LAD.  Echo showed EF of 60%.  Patient was discharged on DAPT with ASA and Brilinta.  She denies any chest pain. She has had several episodes of palpitations, come spontaneously usually at rest, last less than three minutes, resolve spontaneously.  She had one episode which lasted approximately an hour, relieved with additional metoprolol.  No other cardiac complaints.  Her scheduled cardiac rehab was delayed due to pneumonia, scheduled to start rehab next week.         Fhx:   Family History   Family history unknown: Yes   Mom  of old age at 93, dementia, Dad  at 46 due to IC bleed.     Shx:  Never smoker, no EtOH or drug use    EKG   Results for orders placed or performed in visit on 12/10/24   EKG 12-lead    Collection Time: 12/10/24  8:43 AM   Result Value Ref Range    QRS Duration 80 ms    OHS QTC Calculation 435 ms    Narrative    Test Reason : I10,    Vent. Rate :  74 BPM     Atrial Rate :  74 BPM     P-R Int : 202 ms          QRS Dur :  80 ms      QT Int : 392 ms       P-R-T Axes :  71  42  58 degrees    QTcB Int : 435 ms    Normal sinus rhythm  Normal ECG  When compared with ECG of 2024 07:41,  Minimal criteria for Anterior infarct are no longer Present  Confirmed by Farhan Forbes ([id number]) on 2024 1:34:41 PM    Referred By:            Confirmed By: Farhan Forbes     ECHO Results for orders placed during the hospital encounter of 24    Echo    Interpretation Summary    Left Ventricle: The left ventricle is normal in size. Mildly increased wall thickness. There is concentric remodeling. There is normal systolic function. Biplane (2D) method of discs ejection fraction is  60%. There is normal diastolic function.    Right Ventricle: Normal right ventricular cavity size. Systolic function is normal.    Tricuspid Valve: There is mild regurgitation.    Pulmonary Artery: The estimated pulmonary artery systolic pressure is 23 mmHg.    IVC/SVC: Normal venous pressure at 3 mmHg.    [facility] Results for orders placed during the hospital encounter of 11/21/24    Cardiac catheterization    Conclusion    The Mid LAD lesion was 70% stenosed with 0% stenosis post-intervention.    The ejection fraction was calculated to be 65%.    The left ventricular systolic function was normal.    The left ventricular end diastolic pressure was normal.    The pre-procedure left ventricular end diastolic pressure was 6.    Mid LAD lesion: A stent was successfully placed.    The estimated blood loss was none.    There was single vessel coronary artery disease.    The procedure log was documented by Documenter: Kassidy Romeo RN and verified by Raphael Chaves DO.    Date: 11/22/2024  Time: 4:29 PM    Normal LV systolic function, est EF 65%  Severe single vessel disease undergoing successful PCI with ERASMO mid LAD        Lab Results   Component Value Date     (L) 11/22/2024    K 3.6 11/22/2024     11/22/2024    CO2 22 (L) 11/22/2024    BUN 12 11/22/2024    CREATININE 0.72 11/22/2024    CALCIUM 8.3 (L) 11/22/2024    ANIONGAP 14 11/22/2024    ESTGFRAFRICA 74 05/20/2020    EGFRNONAA 89 06/21/2022       Lab Results   Component Value Date    CHOL 148 01/02/2025    CHOL 145 11/22/2024     Lab Results   Component Value Date    HDL 61 (H) 01/02/2025    HDL 39 11/22/2024     Lab Results   Component Value Date    LDLCALC 68 01/02/2025    LDLCALC 88 11/22/2024     Lab Results   Component Value Date    TRIG 96 01/02/2025    TRIG 92 11/22/2024     Lab Results   Component Value Date    CHOLHDL 2.4 01/02/2025    CHOLHDL 3.7 11/22/2024       Lab Results   Component Value Date    WBC 7.50 01/02/2025    HGB 11.2  (L) 01/02/2025    HCT 35.1 (L) 01/02/2025    MCV 95.6 01/02/2025     01/02/2025           Current Outpatient Medications:     aspirin 81 MG Chew, Take 1 tablet (81 mg total) by mouth once daily., Disp: 30 tablet, Rfl: 11    atorvastatin (LIPITOR) 80 MG tablet, Take 1 tablet (80 mg total) by mouth once daily., Disp: 30 tablet, Rfl: 2    estradioL (ESTRACE) 1 MG tablet, Take 1 tablet (1 mg total) by mouth once daily., Disp: 90 tablet, Rfl: 3    folic acid (FOLVITE) 1 MG tablet, Take 1 tablet (1 mg total) by mouth once daily., Disp: 60 tablet, Rfl: 0    levothyroxine (SYNTHROID) 100 MCG tablet, Take 100 mcg by mouth before breakfast., Disp: , Rfl:     lisinopriL (PRINIVIL,ZESTRIL) 5 MG tablet, Take 1 tablet (5 mg total) by mouth once daily., Disp: 30 tablet, Rfl: 2    metoprolol succinate (TOPROL-XL) 25 MG 24 hr tablet, Take 25 mg by mouth once daily., Disp: , Rfl:     nitroGLYCERIN (NITROSTAT) 0.4 MG SL tablet, Place 1 tablet (0.4 mg total) under the tongue every 5 (five) minutes as needed for Chest pain (for a max of 3 tabs in 15 minutes)., Disp: 25 tablet, Rfl: 4    omeprazole (PRILOSEC) 40 MG capsule, Take 40 mg by mouth 2 (two) times daily., Disp: , Rfl:     predniSONE (DELTASONE) 20 MG tablet, Take 1 tablet (20 mg total) by mouth once daily. for 14 days, Disp: 14 tablet, Rfl: 0    sulfaSALAzine (AZULFIDINE) 500 MG EC tablet, Take 2 tablets (1,000 mg total) by mouth 2 (two) times a day., Disp: 240 tablet, Rfl: 0    ticagrelor (BRILINTA) 90 mg tablet, Take 1 tablet (90 mg total) by mouth 2 (two) times daily., Disp: 180 tablet, Rfl: 3    risankizumab-rzaa 180 mg/1.2 mL (150 mg/mL) Injt, Inject 180 mg into the skin every 8 weeks. for 6 doses (Patient not taking: Reported on 1/22/2025), Disp: 1.2 mL, Rfl: 5    Review of Systems   Constitutional:  Negative for chills, diaphoresis, fever and malaise/fatigue.   Respiratory:  Positive for cough and shortness of breath.    Cardiovascular:  Negative for  "chest pain, palpitations, orthopnea, leg swelling and PND.   Gastrointestinal:  Negative for abdominal pain, nausea and vomiting.   Musculoskeletal:  Negative for falls.   Neurological:  Negative for focal weakness and weakness.         Objective:   /82 (BP Location: Left arm, Patient Position: Sitting)   Pulse 92   Ht 5' 8" (1.727 m)   Wt 80.3 kg (177 lb)   SpO2 97%   BMI 26.91 kg/m²     Physical Exam  Constitutional:       General: She is not in acute distress.     Appearance: Normal appearance.   Cardiovascular:      Rate and Rhythm: Normal rate and regular rhythm.   Pulmonary:      Effort: Pulmonary effort is normal.      Breath sounds: Normal breath sounds.   Musculoskeletal:      Cervical back: Neck supple. No rigidity.      Right lower leg: No edema.      Left lower leg: No edema.   Skin:     General: Skin is warm and dry.   Neurological:      Mental Status: She is alert.         Assessment:     No diagnosis found.        Plan:   No problem-specific Assessment & Plan notes found for this encounter.        Follow up with Dr. Chaves in 1 month    "

## 2025-01-24 ENCOUNTER — CLINICAL SUPPORT (OUTPATIENT)
Facility: HOSPITAL | Age: 70
End: 2025-01-24
Attending: INTERNAL MEDICINE
Payer: MEDICARE

## 2025-01-24 ENCOUNTER — PATIENT MESSAGE (OUTPATIENT)
Dept: GASTROENTEROLOGY | Facility: CLINIC | Age: 70
End: 2025-01-24
Payer: MEDICARE

## 2025-01-24 DIAGNOSIS — I21.4 NSTEMI (NON-ST ELEVATION MYOCARDIAL INFARCTION): Primary | ICD-10-CM

## 2025-01-24 PROCEDURE — 93798 PHYS/QHP OP CAR RHAB W/ECG: CPT

## 2025-01-28 VITALS
HEART RATE: 85 BPM | OXYGEN SATURATION: 97 % | DIASTOLIC BLOOD PRESSURE: 70 MMHG | SYSTOLIC BLOOD PRESSURE: 132 MMHG | RESPIRATION RATE: 16 BRPM | TEMPERATURE: 99 F

## 2025-01-28 NOTE — PROGRESS NOTES
Session: Orientation   Cardiac Rehab Individual Treatment Plan - Initial Assessment      Patient Name: Isabella Kelly MRN: [id number]   : 1955   Age: 69 y.o.   Date of Event: 24   Primary Diagnosis: NSTEMI    EF: 60    Physical Assessment:   /70   Pulse 85   Temp 98.7 °F (37.1 °C)   Resp 16   SpO2 97%     ASSESSMENT:  Heart Sounds: regular rate and rhythm, S1, S2 normal, no murmur, click, rub or gallop  Prosthetic Valve: No  Lung Sounds: normal air entry, lungs clear to auscultation  Capillary Refill: normal  Left Radial Pulse: Normal (+2)  Right Radial Pulse: Normal (+2)  Left Pedal Pulse: Normal (+2)  Right Pedal Pulse: Normal (+2)  Right Edema: none  Left Edema none  Strength: normal  Range of Motion: full range of motion  Existing Limitations:      Site   [] Arthritis, bursitis    [] Amputation, atrophy    [] Other:    []       Diabetic patient's foot examination comments:  N/A  Incisional site: N/A  Special needs: N\A      Psychosocial Assessment:   Outcome Survey Tools:    JAGRUTI SCORES:            SF-36           PHQ-9:      2025     2:42 PM   PHQ-9 Depression Patient Health Questionnaire   Over the last two weeks how often have you been bothered by little interest or pleasure in doing things 0   Over the last two weeks how often have you been bothered by feeling down, depressed or hopeless 0   Over the last two weeks how often have you been bothered by trouble falling or staying asleep, or sleeping too much 0   Over the last two weeks how often have you been bothered by feeling tired or having little energy 0   Over the last two weeks how often have you been bothered by a poor appetite or overeating 1   Over the last two weeks how often have you been bothered by feeling bad about yourself - or that you are a failure or have let yourself or your family down 0   Over the last two weeks how often have you been bothered by trouble concentrating on things, such as reading the  newspaper or watching television 0   Over the last two weeks how often have you been bothered by moving or speaking so slowly that other people could have noticed. 0   Over the last two weeks how often have you been bothered by thoughts that you would be better off dead, or of hurting yourself 0   If you checked off any problems, how difficult have these problems made it for you to do your work, take care of things at home or get along with other people? Somewhat difficult   PHQ-9 Score 1              Living Arrangements: Lives with spouse  Family Support: children, Zoroastrian members, family, and spouse  Self Reported: Effective Coping Skills  Displays: socializes with others, happiness, smiles often, and calmness  Medication: not applicable    Psychosocial Plan:   Goals:  Maintain positive support system  Maintain positive outlook  Improve overall quality of life    Interventions/Recommendations:  Discussed Results of Surveys  Patient to Self Report Emotional Changes at Session Check In  Recommend Physical Activity  Recommend Attending Education Lectures  Notify MD: No  Program Referral: No  Pharmaceutical Intervention/Therapy: No  Other Needs: not applicable  Stage of Readiness to Change: Preparation    Education:  Stress Management; needs reinforcement; Date: 1/24/25  Stress; needs reinforcement; Date: 01/24/25    Comments:  Patient demonstrates a solid understanding of the objectives and is eager to participate in their care plan, showing readiness to learn. The patient remains calm and optimistic. Patient has been instructed to notify staff in the event that circumstances worsen.  Patient verbalizes understanding.    Other Core Components/Risk Factors Assessment:   RISK FACTORS:  hyperlipidemia, hypertension, positive family history    Learning Barriers: None    Education Level:  Associate/Bachelor Degree    Pre-test Score: 80%    Medication Compliance: has been compliant with taking medications    Other Core  Components/Risk Factors Plan:   Goals:  Decrease cholesterol level: In Progress  Increase exercise tolerance: In Progress  Increase knowledge of CAD: In Progress  Decrease blood pressure: In Progress  Weight loss: In Progress  Learn more about healthy eating: In Progress    Interventions/Recommendations:  Recommend regular attendance for Cardiac Rehab: Exercise and Education Lectures  Encourage medication compliance  Individual Education/ Counseling: No  Physician Referral: No    Education:    risk factors, verbalizes understanding and needs reinforcement; Date: 01/24/25         Education method adapted to patients education level and preferred method of learning.  Method: explanation and handouts    Comments:  Discussed risk factors with the patient and explored strategies for making improvements moving forward in cardiac rehab. Patient verbalized readiness to learn and actively engage in the program.     Other Core Components/Hypertension Assessment:   Resting BP:   BP Readings from Last 1 Encounters:   01/24/25 132/70         BP Diagnosis: Normotensive  Patient reported symptoms: none    Medications:  Medication Prescribed? Adherent? Exception   Beta-blocker []Yes  []No  []Unknown []Yes  []No  []Unknown    ACEI/ARB []Yes  []No  []Unknown []Yes  []No  []Unknown    Calcium Channel Blocker []Yes  []No  []Unknown []Yes  []No  []Unknown    Diuretic []Yes  []No  []Unknown []Yes  []No  []Unknown        Other Core Components/Hypertension Plan:   Goals:  Blood Pressure <130/80    Interventions/Recommendations:  Med Card Reconciled: Yes  Encourage medication compliance  Encourage sodium reduction  Encourage weight loss  Recommend physical activity  Educate on contributory factors  Reduce stress, anxiety, anger, depression, and/or chronic pain  Encourage home blood pressure monitoring  Recommend daily weights  Enroll in Digital Hypertension Program    Education:    Risk Factors; verbalizes understanding and needs  reinforcement; Date: 01/24/25         Comments:  Discussed risk factors with the patient and explored strategies for making improvements moving forward in cardiac rehab. Patient verbalized readiness to learn and actively engage in the program.       Does the patient have Heart Failure? No    Other Core Components/Tobacco Cessation Assessment:   Smoking Status: Lifetime Non-smoker  Primary Tobacco Type: N/A  Tobacco Usage:  n/a  Smoking Cessation Barriers:  N/A  Stage of Readiness to Change: Maintenance    Other Core Components/Tobacco Cessation Plan:   Goals:  Maintain non-smoking status    Interventions:  Maintains non-smoking status    Education:    Risk Factors; verbalizes understanding and needs reinforcement; Date: 01/24/25         Comments:  Discussed Cardiac Rehab program in depth with patient.  Medication list updated per patient & marked as reviewed.  Patient has been instructed to notify staff of any problems while attending rehab (ie: chest pain, shortness of breath, lightheadedness, dizziness).  Patient has been instructed to monitor blood pressure readings outside of rehab & to keep a daily log of the readings.  Patient verbalizes understanding.

## 2025-02-03 ENCOUNTER — CLINICAL SUPPORT (OUTPATIENT)
Facility: HOSPITAL | Age: 70
End: 2025-02-03
Attending: INTERNAL MEDICINE
Payer: MEDICARE

## 2025-02-03 DIAGNOSIS — I21.4 NSTEMI (NON-ST ELEVATION MYOCARDIAL INFARCTION): Primary | ICD-10-CM

## 2025-02-03 PROCEDURE — 93798 PHYS/QHP OP CAR RHAB W/ECG: CPT

## 2025-02-05 ENCOUNTER — CLINICAL SUPPORT (OUTPATIENT)
Facility: HOSPITAL | Age: 70
End: 2025-02-05
Attending: INTERNAL MEDICINE
Payer: MEDICARE

## 2025-02-05 DIAGNOSIS — I21.4 NSTEMI (NON-ST ELEVATION MYOCARDIAL INFARCTION): Primary | ICD-10-CM

## 2025-02-05 PROCEDURE — 93798 PHYS/QHP OP CAR RHAB W/ECG: CPT

## 2025-02-06 ENCOUNTER — CLINICAL SUPPORT (OUTPATIENT)
Facility: HOSPITAL | Age: 70
End: 2025-02-06
Attending: INTERNAL MEDICINE
Payer: MEDICARE

## 2025-02-06 DIAGNOSIS — I21.4 NSTEMI (NON-ST ELEVATION MYOCARDIAL INFARCTION): Primary | ICD-10-CM

## 2025-02-06 PROCEDURE — 93798 PHYS/QHP OP CAR RHAB W/ECG: CPT

## 2025-02-06 NOTE — PROGRESS NOTES
Patient arrived for cardiac rehab session. Pt reports to have eaten prior to exercise session.  The patient was on continuous EKG monitoring throughout the session. The patient tolerated exercise session well with no complaints. See cardiac rehab daily session report, 2/6/2025.

## 2025-02-12 ENCOUNTER — CLINICAL SUPPORT (OUTPATIENT)
Facility: HOSPITAL | Age: 70
End: 2025-02-12
Attending: INTERNAL MEDICINE
Payer: MEDICARE

## 2025-02-12 DIAGNOSIS — I21.4 NSTEMI (NON-ST ELEVATION MYOCARDIAL INFARCTION): Primary | ICD-10-CM

## 2025-02-12 PROCEDURE — 93798 PHYS/QHP OP CAR RHAB W/ECG: CPT

## 2025-02-13 ENCOUNTER — INFUSION (OUTPATIENT)
Dept: INFUSION THERAPY | Facility: HOSPITAL | Age: 70
End: 2025-02-13
Attending: INTERNAL MEDICINE
Payer: MEDICARE

## 2025-02-13 ENCOUNTER — CLINICAL SUPPORT (OUTPATIENT)
Facility: HOSPITAL | Age: 70
End: 2025-02-13
Attending: INTERNAL MEDICINE
Payer: MEDICARE

## 2025-02-13 VITALS
RESPIRATION RATE: 17 BRPM | DIASTOLIC BLOOD PRESSURE: 80 MMHG | HEART RATE: 83 BPM | OXYGEN SATURATION: 99 % | SYSTOLIC BLOOD PRESSURE: 125 MMHG

## 2025-02-13 DIAGNOSIS — I21.4 NSTEMI (NON-ST ELEVATION MYOCARDIAL INFARCTION): Primary | ICD-10-CM

## 2025-02-13 DIAGNOSIS — K51.919 ULCERATIVE COLITIS WITH COMPLICATION, UNSPECIFIED LOCATION: Primary | ICD-10-CM

## 2025-02-13 PROCEDURE — 96365 THER/PROPH/DIAG IV INF INIT: CPT

## 2025-02-13 PROCEDURE — 96366 THER/PROPH/DIAG IV INF ADDON: CPT

## 2025-02-13 PROCEDURE — 25000003 PHARM REV CODE 250: Performed by: NURSE PRACTITIONER

## 2025-02-13 PROCEDURE — 93798 PHYS/QHP OP CAR RHAB W/ECG: CPT

## 2025-02-13 RX ORDER — EPINEPHRINE 0.3 MG/.3ML
0.3 INJECTION SUBCUTANEOUS ONCE AS NEEDED
Status: DISCONTINUED | OUTPATIENT
Start: 2025-02-13 | End: 2025-02-13 | Stop reason: HOSPADM

## 2025-02-13 RX ORDER — HEPARIN 100 UNIT/ML
500 SYRINGE INTRAVENOUS
OUTPATIENT
Start: 2025-02-13

## 2025-02-13 RX ORDER — SODIUM CHLORIDE 0.9 % (FLUSH) 0.9 %
10 SYRINGE (ML) INJECTION
Status: DISCONTINUED | OUTPATIENT
Start: 2025-02-13 | End: 2025-02-13 | Stop reason: HOSPADM

## 2025-02-13 RX ORDER — DIPHENHYDRAMINE HYDROCHLORIDE 50 MG/ML
50 INJECTION INTRAMUSCULAR; INTRAVENOUS ONCE AS NEEDED
Status: DISCONTINUED | OUTPATIENT
Start: 2025-02-13 | End: 2025-02-13 | Stop reason: HOSPADM

## 2025-02-13 RX ORDER — SODIUM CHLORIDE 0.9 % (FLUSH) 0.9 %
10 SYRINGE (ML) INJECTION
OUTPATIENT
Start: 2025-02-13

## 2025-02-13 RX ORDER — HEPARIN 100 UNIT/ML
500 SYRINGE INTRAVENOUS
Status: DISCONTINUED | OUTPATIENT
Start: 2025-02-13 | End: 2025-02-13 | Stop reason: HOSPADM

## 2025-02-13 RX ORDER — METHYLPREDNISOLONE SOD SUCC 125 MG
125 VIAL (EA) INJECTION
Status: DISCONTINUED | OUTPATIENT
Start: 2025-02-13 | End: 2025-02-13 | Stop reason: HOSPADM

## 2025-02-13 RX ORDER — METHYLPREDNISOLONE SOD SUCC 125 MG
125 VIAL (EA) INJECTION
OUTPATIENT
Start: 2025-02-13

## 2025-02-13 RX ORDER — DIPHENHYDRAMINE HYDROCHLORIDE 50 MG/ML
50 INJECTION INTRAMUSCULAR; INTRAVENOUS ONCE AS NEEDED
OUTPATIENT
Start: 2025-02-13

## 2025-02-13 RX ORDER — EPINEPHRINE 0.3 MG/.3ML
0.3 INJECTION SUBCUTANEOUS ONCE AS NEEDED
OUTPATIENT
Start: 2025-02-13

## 2025-02-13 RX ADMIN — DEXTROSE MONOHYDRATE 1200 MG: 50 INJECTION, SOLUTION INTRAVENOUS at 11:02

## 2025-02-13 NOTE — PROGRESS NOTES
1130 Pt here for Skyrizi infusion, resting in recliner, TP released and pharmacy notified    Medication handout given to pt and discussed, consent for infusion signed  1151 Skyrizi infusion started to infuse over 2 hrs  1404 Skyrizi infusion complete, tolerated well, will continue to monitor  1435 pt discharged ambulatory with no adverse reaction noted, pt notified to go to ER with any adverse reactions, AVS given along with medication information  Follow up appt made 4 weeks

## 2025-02-13 NOTE — PROGRESS NOTES
Patient arrived for cardiac rehab session. Pt reports to have eaten prior to exercise session.  The patient was on continuous EKG monitoring throughout the session. The patient tolerated exercise session well with no complaints. See cardiac rehab daily session report, 2/13/2025.

## 2025-02-17 ENCOUNTER — CLINICAL SUPPORT (OUTPATIENT)
Facility: HOSPITAL | Age: 70
End: 2025-02-17
Attending: INTERNAL MEDICINE
Payer: MEDICARE

## 2025-02-17 DIAGNOSIS — I21.4 NSTEMI (NON-ST ELEVATION MYOCARDIAL INFARCTION): Primary | ICD-10-CM

## 2025-02-17 PROCEDURE — 93798 PHYS/QHP OP CAR RHAB W/ECG: CPT

## 2025-02-17 NOTE — PROGRESS NOTES
Patient arrived for cardiac rehab session. Pt reports to have eaten prior to exercise session.  The patient was on continuous EKG monitoring throughout the session. The patient tolerated exercise session well with no complaints. See cardiac rehab daily session report, 2/17/2025.

## 2025-02-19 ENCOUNTER — CLINICAL SUPPORT (OUTPATIENT)
Facility: HOSPITAL | Age: 70
End: 2025-02-19
Attending: INTERNAL MEDICINE
Payer: MEDICARE

## 2025-02-19 DIAGNOSIS — I21.4 NON-ST ELEVATION MYOCARDIAL INFARCTION (NSTEMI): Primary | ICD-10-CM

## 2025-02-19 PROCEDURE — 93798 PHYS/QHP OP CAR RHAB W/ECG: CPT

## 2025-02-19 NOTE — PROGRESS NOTES
Patient arrived for cardiac rehab session. Pt reports to have eaten prior to exercise session.  The patient was on continuous EKG monitoring throughout the session. The patient tolerated exercise session well with no complaints. See cardiac rehab daily session report.

## 2025-02-20 ENCOUNTER — CLINICAL SUPPORT (OUTPATIENT)
Facility: HOSPITAL | Age: 70
End: 2025-02-20
Attending: INTERNAL MEDICINE
Payer: MEDICARE

## 2025-02-20 DIAGNOSIS — I21.4 NSTEMI (NON-ST ELEVATION MYOCARDIAL INFARCTION): Primary | ICD-10-CM

## 2025-02-20 PROCEDURE — 93798 PHYS/QHP OP CAR RHAB W/ECG: CPT

## 2025-02-21 ENCOUNTER — PATIENT MESSAGE (OUTPATIENT)
Dept: CARDIOLOGY | Facility: CLINIC | Age: 70
End: 2025-02-21
Payer: MEDICARE

## 2025-02-24 ENCOUNTER — CLINICAL SUPPORT (OUTPATIENT)
Facility: HOSPITAL | Age: 70
End: 2025-02-24
Attending: INTERNAL MEDICINE
Payer: MEDICARE

## 2025-02-24 DIAGNOSIS — I21.4 NON-ST ELEVATION MYOCARDIAL INFARCTION (NSTEMI): Primary | ICD-10-CM

## 2025-02-24 PROCEDURE — 93798 PHYS/QHP OP CAR RHAB W/ECG: CPT

## 2025-02-25 NOTE — PROGRESS NOTES
Cardiac Rehab Individual Treatment Plan - Reassessment      Patient Name: Isabella Kelly MRN: [id number]   : 1955   Age: 69 y.o.   Primary Diagnosis: NSTEMI Date of Event: 2025   EF: 60%  Risk Stratification: moderate  Referring Physician: Raphael Chaves   Exercise Assessment:     Angina with exercise?: No   ST Depression with Exercise?: No  Fall Risk: No   Assistive Devices:  independent  Patient stated at orientation there were no limitations to exercise. Exercise modalities have been modified to meet the patient's needs and capabilities.  Comments on Progression: Patient has shown demonstrable change via her exercise and lifestyle habits by consistently attending cardiac rehab and instituting needed changes through education and encouragement.      Exercise Plan:   Goals:  CR Exercise Goals: Attend Cardiac Rehab 3 times/week: Met  Home Aerobic Exercise: 2 additional days/week for 30-60 minutes: Met  Intensity of 12-15 on the Rate of Perceived Exertion (RPE) scale: Met  Demonstrate proper pulse taking technique: Met    Comments on Goal Progression:  Patient Consistency: consistent with attendance  Home exercise? Yes: Walking and Aerobic; Frequency: 2; Duration 20-*30 minutes  Patient reports intensity rate 11-13 on RPE scale  Patient is progressing well  Patient is Able to demonstrate proper pulse taking technique with or without a fitness tracker.      Intervention/Recommendations:   Discussed importance of regular attendance to cardiac rehab class    Exercise Prescription:  THR Range 100-114   Mode: Treadmill   Frequency:  3 days/week   Duration:  30-60 minutes   Intensity:  12-15 RPE   Resistance Training:  Yes: 3 lb weights with 10-15 reps based on strength and range of motion and adjusted accordingly     Home Prescription:  Mode Aerobic exercise   Frequency: 2- 3 days/week   Duration: 30-60 minutes   Resistance Training: None     Education:  Resistance Training; demonstrated understanding;  Date: 2/16/2025    Comments:  Patient continues to demonstrate significant improvements in her cardiac and pulmonary output through consistent attendance in cardiac rehab and remaining active outside of the program. Patient remains dedicated to lifelong change in her lifestyle habits. The patient has continually met and exceeded her goals set forth within the program. The exercise prescription will continue to be adjusted based on tolerance of exercise intensity by patient.    Amol Zepeda M.S., CEP

## 2025-02-26 ENCOUNTER — TELEPHONE (OUTPATIENT)
Dept: CARDIOLOGY | Facility: CLINIC | Age: 70
End: 2025-02-26
Payer: MEDICARE

## 2025-02-26 ENCOUNTER — CLINICAL SUPPORT (OUTPATIENT)
Facility: HOSPITAL | Age: 70
End: 2025-02-26
Attending: INTERNAL MEDICINE
Payer: MEDICARE

## 2025-02-26 DIAGNOSIS — I21.4 NSTEMI (NON-ST ELEVATION MYOCARDIAL INFARCTION): Primary | ICD-10-CM

## 2025-02-26 PROCEDURE — 93798 PHYS/QHP OP CAR RHAB W/ECG: CPT

## 2025-02-26 RX ORDER — ATORVASTATIN CALCIUM 80 MG/1
80 TABLET, FILM COATED ORAL DAILY
Qty: 90 TABLET | Refills: 3 | Status: SHIPPED | OUTPATIENT
Start: 2025-02-26 | End: 2026-02-26

## 2025-02-26 NOTE — TELEPHONE ENCOUNTER
Pt called stating she was almost out of Lipitor. Was changed from Crestor to Lipitor after stents were placed. Pt wants to know if she can change back to Crestor, states she has 3m worth of refills on it.   Discussed with , OK to change from Lipitor back to Crestor.  Notified pt of above msg. Pt verbalized understanding.

## 2025-03-02 DIAGNOSIS — K51.919 ULCERATIVE COLITIS WITH COMPLICATION, UNSPECIFIED LOCATION: ICD-10-CM

## 2025-03-02 DIAGNOSIS — M19.90 INFLAMMATORY ARTHRITIS: ICD-10-CM

## 2025-03-03 ENCOUNTER — CLINICAL SUPPORT (OUTPATIENT)
Facility: HOSPITAL | Age: 70
End: 2025-03-03
Attending: INTERNAL MEDICINE
Payer: MEDICARE

## 2025-03-03 DIAGNOSIS — I21.4 NON-ST ELEVATION MYOCARDIAL INFARCTION (NSTEMI): Primary | ICD-10-CM

## 2025-03-03 RX ORDER — FOLIC ACID 1 MG/1
1000 TABLET ORAL
Qty: 90 TABLET | Refills: 1 | Status: SHIPPED | OUTPATIENT
Start: 2025-03-03

## 2025-03-05 ENCOUNTER — CLINICAL SUPPORT (OUTPATIENT)
Facility: HOSPITAL | Age: 70
End: 2025-03-05
Attending: INTERNAL MEDICINE
Payer: MEDICARE

## 2025-03-05 ENCOUNTER — OFFICE VISIT (OUTPATIENT)
Dept: SURGERY | Facility: CLINIC | Age: 70
End: 2025-03-05
Payer: MEDICARE

## 2025-03-05 VITALS — WEIGHT: 177 LBS | BODY MASS INDEX: 26.83 KG/M2 | HEIGHT: 68 IN

## 2025-03-05 DIAGNOSIS — D17.21 LIPOMA OF RIGHT UPPER EXTREMITY: Primary | ICD-10-CM

## 2025-03-05 DIAGNOSIS — I21.4 NSTEMI (NON-ST ELEVATION MYOCARDIAL INFARCTION): Primary | ICD-10-CM

## 2025-03-05 PROCEDURE — 93798 PHYS/QHP OP CAR RHAB W/ECG: CPT

## 2025-03-05 PROCEDURE — 99999 PR PBB SHADOW E&M-EST. PATIENT-LVL III: CPT | Mod: PBBFAC,,, | Performed by: STUDENT IN AN ORGANIZED HEALTH CARE EDUCATION/TRAINING PROGRAM

## 2025-03-05 PROCEDURE — 99204 OFFICE O/P NEW MOD 45 MIN: CPT | Mod: S$PBB,,, | Performed by: STUDENT IN AN ORGANIZED HEALTH CARE EDUCATION/TRAINING PROGRAM

## 2025-03-05 PROCEDURE — 99213 OFFICE O/P EST LOW 20 MIN: CPT | Mod: PBBFAC,25 | Performed by: STUDENT IN AN ORGANIZED HEALTH CARE EDUCATION/TRAINING PROGRAM

## 2025-03-05 NOTE — PROGRESS NOTES
History & Physical    Subjective     History of Present Illness:  69-year-old  female presents to the clinic for evaluation for possible fatty tumor to the right elbow.  Patient states over the past few months she has noticed whenever she leans in her elbow, she feels with feels like a marble up on her elbow whenever she rolls arm around.  Patient states she can feel it is soft but round and she can move it slightly under the skin, but it is fixed close to the bone.  Denies any chest pain/shortness of breath, nausea/vomiting/diarrhea, fever/chills.  Patient has seen orthopedic surgery in the past    Chief Complaint   Patient presents with    Pre-op Exam     Pt here for lipoma (R) elbow, states no complaints at this time        Review of patient's allergies indicates:  No Known Allergies    Current Medications[1]    Past Medical History:   Diagnosis Date    Acquired hypothyroidism 01/13/2023    CAD S/P percutaneous coronary angioplasty 11/22/2024    Depressive disorder 09/2004    mild    Esophageal reflux     Heart attack 11/22/2024    Hyperlipidemia 10/26/2011    mile;  TG  71 mg/dl;  HDL  67 mg/dl;  LDL  136 mg/dl;  total cholesterol  217 mg/dl.  10/13/2017:  LDL  163 mg/dl;  total cholesterol  260 mg/dl; triglycerides  186 mg/dl;  HDL  66 mg/dl    Hypertension     IBD (inflammatory bowel disease)     Iron deficiency anemia due to chronic blood loss 12/19/2022    PONV (postoperative nausea and vomiting)     Post-hysterectomy menopause     Stress incontinence     Ulcerative colitis 08/2020    Ulcerative colitis confirmed by colonoscopy by Dr. Ho Capellan    Uterine prolapse      Past Surgical History:   Procedure Laterality Date    ANGIOGRAM, CORONARY, WITH LEFT HEART CATHETERIZATION N/A 11/22/2024    Procedure: Angiogram, Coronary, with Left Heart Cath;  Surgeon: Raphael Chaves DO;  Location: [facility] CATH LAB;  Service: Cardiology;  Laterality: N/A;    anterior/posterior colporrhaphy with vaginal  "enterocele repair  06/18/2008    APPENDECTOMY      COLONOSCOPY      DILATION AND CURETTAGE OF UTERUS      [id number]    ENDOMETRIAL BIOPSY  02/16/2006    ENDOMETRIAL BIOPSY REPEAT   11/06/2007    heart stent  11/22/2024    HYSTEROSCOPY  11/28/2007    PERCUTANEOUS CORONARY INTERVENTION, ARTERY N/A 11/22/2024    Procedure: Percutaneous coronary intervention;  Surgeon: Raphael Chaves DO;  Location: [facility] CATH LAB;  Service: Cardiology;  Laterality: N/A;    SILVER NITRATE CAUTERY OF VAGINAL WALL  08/12/2008    SIS  11/15/2007    TOT SLING  06/18/2008    TOTAL ABDOMINAL HYSTERECTOMY       Family History   Family history unknown: Yes     Social History[2]     Review of Systems:  Review of Systems   Constitutional: Negative.  Negative for fatigue and unexpected weight change.   HENT: Negative.  Negative for trouble swallowing.    Eyes: Negative.    Respiratory: Negative.  Negative for chest tightness and shortness of breath.    Cardiovascular: Negative.  Negative for chest pain.   Gastrointestinal: Negative.  Negative for abdominal pain, blood in stool and nausea.   Endocrine: Negative.    Genitourinary: Negative.  Negative for hematuria.   Musculoskeletal: Negative.  Negative for back pain and myalgias.   Neurological: Negative.  Negative for dizziness, speech difficulty, weakness and light-headedness.   Psychiatric/Behavioral: Negative.  Negative for agitation and behavioral problems.           Objective     Vital Signs (Most Recent)     5' 8" (1.727 m)  80.3 kg (177 lb)     Physical Exam:  Physical Exam  Constitutional:       General: She is not in acute distress.     Appearance: Normal appearance.   HENT:      Head: Normocephalic.   Cardiovascular:      Rate and Rhythm: Normal rate.   Pulmonary:      Effort: Pulmonary effort is normal. No respiratory distress.   Abdominal:      General: There is no distension.      Tenderness: There is no abdominal tenderness.   Musculoskeletal:         General: Normal range of " motion.        Arms:       Comments: 1 cm circumscribed fatty mass the end of the right elbow, fixated to the olecranon; nontender to palpation   Skin:     General: Skin is warm.      Coloration: Skin is not jaundiced.   Neurological:      General: No focal deficit present.      Mental Status: She is alert and oriented to person, place, and time.      Cranial Nerves: No cranial nerve deficit.            Assessment and Plan   Fatty tumor versus right elbow bursa    PLAN:    This feels more like right elbow bursa/bursitis versus a fatty tumor or lipoma.  We will send the patient to see her orthopedic surgeon for evaluation.              [1]   Current Outpatient Medications   Medication Sig Dispense Refill    aspirin 81 MG Chew Take 1 tablet (81 mg total) by mouth once daily. 30 tablet 11    atorvastatin (LIPITOR) 80 MG tablet Take 1 tablet (80 mg total) by mouth once daily. 90 tablet 3    estradioL (ESTRACE) 1 MG tablet Take 1 tablet (1 mg total) by mouth once daily. 90 tablet 3    folic acid (FOLVITE) 1 MG tablet TAKE 1 TABLET BY MOUTH EVERY DAY 90 tablet 1    levothyroxine (SYNTHROID) 100 MCG tablet Take 100 mcg by mouth before breakfast.      lisinopriL (PRINIVIL,ZESTRIL) 5 MG tablet Take 1 tablet (5 mg total) by mouth once daily. 30 tablet 2    metoprolol succinate (TOPROL-XL) 25 MG 24 hr tablet Take 25 mg by mouth once daily.      nitroGLYCERIN (NITROSTAT) 0.4 MG SL tablet Place 1 tablet (0.4 mg total) under the tongue every 5 (five) minutes as needed for Chest pain (for a max of 3 tabs in 15 minutes). 25 tablet 4    omeprazole (PRILOSEC) 40 MG capsule Take 40 mg by mouth 2 (two) times daily.      risankizumab-rzaa 180 mg/1.2 mL (150 mg/mL) Injt Inject 180 mg into the skin every 8 weeks. for 6 doses (Patient not taking: Reported on 1/22/2025) 1.2 mL 5    sulfaSALAzine (AZULFIDINE) 500 MG EC tablet Take 2 tablets (1,000 mg total) by mouth 2 (two) times a day. 240 tablet 0    ticagrelor (BRILINTA) 90 mg tablet Take  1 tablet (90 mg total) by mouth 2 (two) times daily. 180 tablet 3     No current facility-administered medications for this visit.   [2]   Social History  Tobacco Use    Smoking status: Never    Smokeless tobacco: Never   Substance Use Topics    Alcohol use: Never    Drug use: Never

## 2025-03-06 ENCOUNTER — CLINICAL SUPPORT (OUTPATIENT)
Facility: HOSPITAL | Age: 70
End: 2025-03-06
Attending: INTERNAL MEDICINE
Payer: MEDICARE

## 2025-03-06 DIAGNOSIS — I21.4 NSTEMI (NON-ST ELEVATION MYOCARDIAL INFARCTION): Primary | ICD-10-CM

## 2025-03-06 PROCEDURE — 93798 PHYS/QHP OP CAR RHAB W/ECG: CPT

## 2025-03-06 NOTE — PROGRESS NOTES
Patient arrived for cardiac rehab session. Pt reports to have eaten prior to exercise session.  The patient was on continuous EKG monitoring throughout the session. The patient tolerated exercise session well with no complaints. See cardiac rehab daily session report, 3/6/2025.

## 2025-03-07 DIAGNOSIS — M25.521 RIGHT ELBOW PAIN: Primary | ICD-10-CM

## 2025-03-10 ENCOUNTER — OFFICE VISIT (OUTPATIENT)
Dept: ORTHOPEDICS | Facility: CLINIC | Age: 70
End: 2025-03-10
Payer: MEDICARE

## 2025-03-10 ENCOUNTER — CLINICAL SUPPORT (OUTPATIENT)
Facility: HOSPITAL | Age: 70
End: 2025-03-10
Attending: INTERNAL MEDICINE
Payer: MEDICARE

## 2025-03-10 ENCOUNTER — HOSPITAL ENCOUNTER (OUTPATIENT)
Dept: RADIOLOGY | Facility: HOSPITAL | Age: 70
Discharge: HOME | End: 2025-03-10
Attending: ORTHOPAEDIC SURGERY
Payer: MEDICARE

## 2025-03-10 DIAGNOSIS — I21.4 NSTEMI (NON-ST ELEVATION MYOCARDIAL INFARCTION): Primary | ICD-10-CM

## 2025-03-10 DIAGNOSIS — M70.21 OLECRANON BURSITIS OF RIGHT ELBOW: Primary | ICD-10-CM

## 2025-03-10 DIAGNOSIS — M25.521 RIGHT ELBOW PAIN: ICD-10-CM

## 2025-03-10 PROCEDURE — 99213 OFFICE O/P EST LOW 20 MIN: CPT | Mod: PBBFAC,25 | Performed by: ORTHOPAEDIC SURGERY

## 2025-03-10 PROCEDURE — 99214 OFFICE O/P EST MOD 30 MIN: CPT | Mod: S$PBB,,, | Performed by: ORTHOPAEDIC SURGERY

## 2025-03-10 PROCEDURE — 93798 PHYS/QHP OP CAR RHAB W/ECG: CPT

## 2025-03-10 PROCEDURE — 73070 X-RAY EXAM OF ELBOW: CPT | Mod: 26,RT,, | Performed by: ORTHOPAEDIC SURGERY

## 2025-03-10 PROCEDURE — 99999 PR PBB SHADOW E&M-EST. PATIENT-LVL III: CPT | Mod: PBBFAC,,, | Performed by: ORTHOPAEDIC SURGERY

## 2025-03-10 PROCEDURE — 73070 X-RAY EXAM OF ELBOW: CPT | Mod: TC,RT

## 2025-03-10 NOTE — PROGRESS NOTES
Patient arrived for cardiac rehab session. Pt reports to have eaten prior to exercise session.  The patient was on continuous EKG monitoring throughout the session. The patient tolerated exercise session well with no complaints. See cardiac rehab daily session report, 3/10/2025.

## 2025-03-10 NOTE — PROGRESS NOTES
CLINIC NOTE       Chief Complaint   Patient presents with    Right Elbow - Pain        Isabella Kelly is a 69 y.o. female seen today for evaluation of right elbow pain and swelling.  She developed swelling of the olecranon process of the right elbow 4-6 weeks ago.  He had a non-STEMI 01/22/2025.  She has seen Dr. Chaves.  She had 1 cardiac stent in his on Brilinta presently.  She attributes soft tissue swelling over the right olecranon bursal region to activities in cardiac rehab.  She did not to her knowledge strike her elbow.  She is right-hand dominant.  Size of the mass and swelling have isolated.  Currently size of the bursal enlargement is at a low point.    Past Medical History:   Diagnosis Date    Acquired hypothyroidism 01/13/2023    CAD S/P percutaneous coronary angioplasty 11/22/2024    Depressive disorder 09/2004    mild    Esophageal reflux     Heart attack 11/22/2024    Hyperlipidemia 10/26/2011    mile;  TG  71 mg/dl;  HDL  67 mg/dl;  LDL  136 mg/dl;  total cholesterol  217 mg/dl.  10/13/2017:  LDL  163 mg/dl;  total cholesterol  260 mg/dl; triglycerides  186 mg/dl;  HDL  66 mg/dl    Hypertension     IBD (inflammatory bowel disease)     Iron deficiency anemia due to chronic blood loss 12/19/2022    PONV (postoperative nausea and vomiting)     Post-hysterectomy menopause     Stress incontinence     Ulcerative colitis 08/2020    Ulcerative colitis confirmed by colonoscopy by Dr. Ho Capellan    Uterine prolapse      Family History   Family history unknown: Yes     Medications Ordered Prior to Encounter[1]    ROS     There were no vitals filed for this visit.    Past Surgical History:   Procedure Laterality Date    ANGIOGRAM, CORONARY, WITH LEFT HEART CATHETERIZATION N/A 11/22/2024    Procedure: Angiogram, Coronary, with Left Heart Cath;  Surgeon: Raphael Chaves DO;  Location: [facility] CATH LAB;  Service: Cardiology;  Laterality: N/A;    anterior/posterior colporrhaphy with vaginal  enterocele repair  06/18/2008    APPENDECTOMY      COLONOSCOPY      DILATION AND CURETTAGE OF UTERUS      [id number]    ENDOMETRIAL BIOPSY  02/16/2006    ENDOMETRIAL BIOPSY REPEAT   11/06/2007    heart stent  11/22/2024    HYSTEROSCOPY  11/28/2007    PERCUTANEOUS CORONARY INTERVENTION, ARTERY N/A 11/22/2024    Procedure: Percutaneous coronary intervention;  Surgeon: Raphael Chaves DO;  Location: [facility] CATH LAB;  Service: Cardiology;  Laterality: N/A;    SILVER NITRATE CAUTERY OF VAGINAL WALL  08/12/2008    SIS  11/15/2007    TOT SLING  06/18/2008    TOTAL ABDOMINAL HYSTERECTOMY          Review of patient's allergies indicates:  No Known Allergies     Ortho Exam right elbow has a normal contour except for a rounded mass in the olecranon bursal region.  He has minimal tenderness palpation.  No erythema or signs of infection.  She has full elbow and forearm motion    Radiographic Examination:  Right elbow 03/10/2025    Technique:  Two views AP and lateral projection    Findings:  Bones well mineralized.  Radiocapitellar and ulnohumeral joints are located.  There is slight soft tissue swelling over the olecranon bursal region.    Impression:   See Above    Assessment and Plan  Patient Active Problem List    Diagnosis Date Noted    History of colon polyps 01/03/2025    SOB (shortness of breath) on exertion 12/10/2024    NSTEMI (non-ST elevation myocardial infarction) 11/22/2024    Sinus tachycardia 11/22/2024    CAD S/P percutaneous coronary angioplasty 11/22/2024    Other intestinal malabsorption 09/25/2024    Polyp of transverse colon 01/17/2024    Adenomatous polyp of ascending colon 01/17/2024    Hypothyroidism 01/13/2023    Anxiety 01/13/2023    Chronic left-sided low back pain without sciatica 01/13/2023    Chronic ulcerative rectosigmoiditis 01/13/2023    Colon, diverticulosis 01/13/2023    Hyperlipidemia 01/13/2023    Hormone replacement therapy 01/13/2023    Primary hypertension 01/13/2023    Arthritis  01/13/2023    Iron deficiency anemia 12/19/2022    Ulcerative colitis with complication 12/12/2022    H/O total knee replacement, left 10/14/2022    Greater trochanteric bursitis of left hip 06/06/2022    History of total right knee replacement 10/13/2021    Primary osteoarthritis of right knee 07/19/2021    Impression:  Olecranon bursitis-right elbow   Plan:  Discussed the option for treatment known in the future.  If she develops recurrent bursal swelling aspiration of the corticosteroid injection can be used.  If she desires removal of the soft tissue mass bursectomy we would be recommended.  At this time while recovering after her heart situation and on blood thinners she will delay consideration of any surgical intervention.  She is advised to avoid probing or trauma to the olecranon region.  She will recheck p.r.maurilio.      Deep Langston M.D.                   [1]   Current Outpatient Medications on File Prior to Visit   Medication Sig Dispense Refill    aspirin 81 MG Chew Take 1 tablet (81 mg total) by mouth once daily. 30 tablet 11    atorvastatin (LIPITOR) 80 MG tablet Take 1 tablet (80 mg total) by mouth once daily. 90 tablet 3    estradioL (ESTRACE) 1 MG tablet Take 1 tablet (1 mg total) by mouth once daily. 90 tablet 3    folic acid (FOLVITE) 1 MG tablet TAKE 1 TABLET BY MOUTH EVERY DAY 90 tablet 1    levothyroxine (SYNTHROID) 100 MCG tablet Take 100 mcg by mouth before breakfast.      lisinopriL (PRINIVIL,ZESTRIL) 5 MG tablet Take 1 tablet (5 mg total) by mouth once daily. 30 tablet 2    metoprolol succinate (TOPROL-XL) 25 MG 24 hr tablet Take 25 mg by mouth once daily.      nitroGLYCERIN (NITROSTAT) 0.4 MG SL tablet Place 1 tablet (0.4 mg total) under the tongue every 5 (five) minutes as needed for Chest pain (for a max of 3 tabs in 15 minutes). 25 tablet 4    omeprazole (PRILOSEC) 40 MG capsule Take 40 mg by mouth 2 (two) times daily.      risankizumab-rzaa 180 mg/1.2 mL (150 mg/mL) Injt Inject 180 mg  into the skin every 8 weeks. for 6 doses (Patient not taking: Reported on 1/22/2025) 1.2 mL 5    ticagrelor (BRILINTA) 90 mg tablet Take 1 tablet (90 mg total) by mouth 2 (two) times daily. 180 tablet 3     No current facility-administered medications on file prior to visit.

## 2025-03-12 ENCOUNTER — CLINICAL SUPPORT (OUTPATIENT)
Facility: HOSPITAL | Age: 70
End: 2025-03-12
Attending: INTERNAL MEDICINE
Payer: MEDICARE

## 2025-03-12 DIAGNOSIS — I21.4 NSTEMI (NON-ST ELEVATION MYOCARDIAL INFARCTION): Primary | ICD-10-CM

## 2025-03-12 PROCEDURE — 93798 PHYS/QHP OP CAR RHAB W/ECG: CPT

## 2025-03-13 ENCOUNTER — INFUSION (OUTPATIENT)
Dept: INFUSION THERAPY | Facility: HOSPITAL | Age: 70
End: 2025-03-13
Attending: INTERNAL MEDICINE
Payer: MEDICARE

## 2025-03-13 ENCOUNTER — CLINICAL SUPPORT (OUTPATIENT)
Facility: HOSPITAL | Age: 70
End: 2025-03-13
Attending: INTERNAL MEDICINE
Payer: MEDICARE

## 2025-03-13 VITALS
OXYGEN SATURATION: 99 % | RESPIRATION RATE: 17 BRPM | HEART RATE: 85 BPM | DIASTOLIC BLOOD PRESSURE: 81 MMHG | SYSTOLIC BLOOD PRESSURE: 137 MMHG

## 2025-03-13 DIAGNOSIS — I21.4 NSTEMI (NON-ST ELEVATION MYOCARDIAL INFARCTION): Primary | ICD-10-CM

## 2025-03-13 DIAGNOSIS — K51.919 ULCERATIVE COLITIS WITH COMPLICATION, UNSPECIFIED LOCATION: Primary | ICD-10-CM

## 2025-03-13 PROCEDURE — 25000003 PHARM REV CODE 250: Performed by: NURSE PRACTITIONER

## 2025-03-13 PROCEDURE — 96365 THER/PROPH/DIAG IV INF INIT: CPT

## 2025-03-13 PROCEDURE — 96366 THER/PROPH/DIAG IV INF ADDON: CPT

## 2025-03-13 PROCEDURE — 93798 PHYS/QHP OP CAR RHAB W/ECG: CPT

## 2025-03-13 RX ORDER — DIPHENHYDRAMINE HYDROCHLORIDE 50 MG/ML
50 INJECTION, SOLUTION INTRAMUSCULAR; INTRAVENOUS ONCE AS NEEDED
Status: DISCONTINUED | OUTPATIENT
Start: 2025-03-13 | End: 2025-03-13 | Stop reason: HOSPADM

## 2025-03-13 RX ORDER — METHYLPREDNISOLONE SOD SUCC 125 MG
125 VIAL (EA) INJECTION
OUTPATIENT
Start: 2025-03-13

## 2025-03-13 RX ORDER — HEPARIN 100 UNIT/ML
500 SYRINGE INTRAVENOUS
Status: DISCONTINUED | OUTPATIENT
Start: 2025-03-13 | End: 2025-03-13 | Stop reason: HOSPADM

## 2025-03-13 RX ORDER — DIPHENHYDRAMINE HYDROCHLORIDE 50 MG/ML
50 INJECTION, SOLUTION INTRAMUSCULAR; INTRAVENOUS ONCE AS NEEDED
OUTPATIENT
Start: 2025-03-13

## 2025-03-13 RX ORDER — SODIUM CHLORIDE 0.9 % (FLUSH) 0.9 %
10 SYRINGE (ML) INJECTION
OUTPATIENT
Start: 2025-03-13

## 2025-03-13 RX ORDER — EPINEPHRINE 0.3 MG/.3ML
0.3 INJECTION SUBCUTANEOUS ONCE AS NEEDED
OUTPATIENT
Start: 2025-03-13

## 2025-03-13 RX ORDER — METHYLPREDNISOLONE SOD SUCC 125 MG
125 VIAL (EA) INJECTION
Status: DISCONTINUED | OUTPATIENT
Start: 2025-03-13 | End: 2025-03-13 | Stop reason: HOSPADM

## 2025-03-13 RX ORDER — EPINEPHRINE 0.3 MG/.3ML
0.3 INJECTION SUBCUTANEOUS ONCE AS NEEDED
Status: DISCONTINUED | OUTPATIENT
Start: 2025-03-13 | End: 2025-03-13 | Stop reason: HOSPADM

## 2025-03-13 RX ORDER — HEPARIN 100 UNIT/ML
500 SYRINGE INTRAVENOUS
OUTPATIENT
Start: 2025-03-13

## 2025-03-13 RX ORDER — SODIUM CHLORIDE 0.9 % (FLUSH) 0.9 %
10 SYRINGE (ML) INJECTION
Status: DISCONTINUED | OUTPATIENT
Start: 2025-03-13 | End: 2025-03-13 | Stop reason: HOSPADM

## 2025-03-13 RX ADMIN — DEXTROSE MONOHYDRATE 1200 MG: 5 INJECTION, SOLUTION INTRAVENOUS at 12:03

## 2025-03-13 NOTE — PROGRESS NOTES
1200 Pt here for skyrizi infusion, resting in recliner, TP released and pharmacy notified  1218 Skyrizi infusion started to infuse over 2 hrs  1429 Skyrizi infusion complete, tolerated well, will continue to monitor  1445 pt discharged ambulatory with no adverse reaction noted, pt notified to go to ER with any adverse reactions, AVS given along with medication information  Follow up appt made 4 weeks    126 85   96 HR

## 2025-03-17 ENCOUNTER — CLINICAL SUPPORT (OUTPATIENT)
Facility: HOSPITAL | Age: 70
End: 2025-03-17
Attending: INTERNAL MEDICINE
Payer: MEDICARE

## 2025-03-17 DIAGNOSIS — I21.4 NON-ST ELEVATION MYOCARDIAL INFARCTION (NSTEMI): Primary | ICD-10-CM

## 2025-03-19 ENCOUNTER — CLINICAL SUPPORT (OUTPATIENT)
Facility: HOSPITAL | Age: 70
End: 2025-03-19
Attending: INTERNAL MEDICINE
Payer: MEDICARE

## 2025-03-19 DIAGNOSIS — I21.4 NSTEMI (NON-ST ELEVATION MYOCARDIAL INFARCTION): Primary | ICD-10-CM

## 2025-03-19 PROCEDURE — 93798 PHYS/QHP OP CAR RHAB W/ECG: CPT

## 2025-03-20 ENCOUNTER — CLINICAL SUPPORT (OUTPATIENT)
Facility: HOSPITAL | Age: 70
End: 2025-03-20
Attending: INTERNAL MEDICINE
Payer: MEDICARE

## 2025-03-20 DIAGNOSIS — I21.4 NSTEMI (NON-ST ELEVATION MYOCARDIAL INFARCTION): Primary | ICD-10-CM

## 2025-03-20 PROCEDURE — 93798 PHYS/QHP OP CAR RHAB W/ECG: CPT

## 2025-03-21 NOTE — PROGRESS NOTES
Cardiac Rehab Individual Treatment Plan - Reassessment      Patient Name: Isabella Kelly MRN: [id number]   : 1955   Age: 69 y.o.   Primary Diagnosis: NSTEMI      Psychosocial Assessment:   Living Arrangements: Lives with spouse  Family Support: children, Religious members, family, and spouse  Self Reported: no change Effective Coping Skills  Displays: socializes with others, happiness, smiles often, and calmness  Medication: not applicable    Psychosocial Plan:   Goals:  Maintain positive support system: In Progress  Maintain positive outlook: In Progress  Improve overall quality of life: In Progress    Comments on Goal Progression:  This patient is on track to meet all of their goals by end of cardiac rehab services.    Interventions:  Patient to Self Report Emotional Changes at Session Check In  Recommend Physical Activity  Recommend Attending Education Lectures  Notify MD: No  Program Referral: No  Pharmaceutical Intervention/Therapy: No  Other Needs: not applicable  Stage of Readiness to Change: Action    Education:  Sexuality; verbalizes understanding and refused teaching teachback; Date: 2025  Stress Management; verbalizes understanding; Date: 2025  Relaxation Techniques; verbalizes understanding; Date: 2025  Stress; verbalizes understanding; Date: 2025      Patient has been instructed to notify staff in the event that circumstances worsen.  Patient verbalizes understanding.    Other Core Components/Risk Factors Assessment:   RISK FACTORS:  hyperlipidemia, hypertension, positive family history    Learning Barriers: None    Medication Compliance: has been compliant with taking medications    Other Core Components/Risk Factors Plan:   Goals:  Decrease cholesterol level: In Progress  Increase exercise tolerance: In Progress  Increase knowledge of CAD: In Progress  Decrease blood pressure: In Progress  Weight loss: In Progress  Learn more about healthy eating: In  Progress    Comments on Goal Progression:  This patient is on track to meet all of their goals by end of cardiac rehab services.    Interventions:  Individual Education/ Counseling: Yes  Physician Referral: No    Education:    fluid overload/CHF, verbalizes understanding; Date: 02/26/2025  hypertension, verbalizes understanding; Date: 02/05/2025  nutrition, verbalizes understanding; Date: 03/03/2025  physical activity, verbalizes understanding; Date: 02/06/2025  risk factors, verbalizes understanding; Date: 03/17/2025  stress, verbalizes understanding; Date: 02/12/2025         Education method adapted to patients education level and preferred method of learning.  Method: explanation  handouts        Other Core Components/Hypertension Assessment:   BP Range:   BP at Goal: No  Patient reported symptoms: none    Medications:  Medication Prescribed? Adherent? Exception   Beta-blocker []Yes  []No  []Unknown []Yes  []No  []Unknown    ACEI/ARB []Yes  []No  []Unknown []Yes  []No  []Unknown    Calcium Channel Blocker []Yes  []No  []Unknown []Yes  []No  []Unknown    Diuretic []Yes  []No  []Unknown []Yes  []No  []Unknown        Other Core Components/Hypertension Plan:   Goals:  Blood Pressure <130/80    Comments on Goal Progression:  Pt blood pressure has been above goal for more than half of their cardiac rehab visits.    Interventions:  Encourage medication compliance  Encourage sodium reduction  Encourage weight loss  Recommend physical activity  Educate on contributory factors  Reduce stress, anxiety, anger, depression, and/or chronic pain  Encourage home blood pressure monitoring  Recommend daily weights    Education:    Hypertension; verbalizes understanding; Date: 02/05/2025  Coronary Artery Disease; verbalizes understanding; Date: 03/20/2025  Congestive Heart Failure; verbalizes understanding; Date: 02/26/2025  Risk Factors; verbalizes understanding; Date: 03/17/2025  Stroke; verbalizes understanding; Date:  03/05/2025  Stress; verbalizes understanding; Date: 02/12/2025             Does the patient have Heart Failure?    NO      Other Core Components/Tobacco Cessation Assessment:   Smoking Status: Lifetime Non-smoker  Primary Tobacco Type: N/A  Tobacco Usage: no  Smoking Cessation Barriers:   Stage of Readiness to Change: Maintenance    Other Core Components/Tobacco Cessation Plan:   Goals:  Maintain non-smoking status    Comments on Goal Progression:      Interventions:  Maintains non-smoking status    Education:    Heart and Lung Anatomy; verbalizes understanding; Date: 02/20/2025  Coronary Artery Disease; verbalizes understanding; Date: 03/20/2025  Risk Factors; verbalizes understanding; Date: 03/17/2025  Hypertension; verbalizes understanding; Date: 02/05/2025  Stroke; verbalizes understanding; Date: 03/05/2025          Comments:   This patient is on track to meet all of their goals by end of cardiac rehab services.

## 2025-03-24 ENCOUNTER — CLINICAL SUPPORT (OUTPATIENT)
Facility: HOSPITAL | Age: 70
End: 2025-03-24
Attending: INTERNAL MEDICINE
Payer: MEDICARE

## 2025-03-24 DIAGNOSIS — I21.4 NON-ST ELEVATION MYOCARDIAL INFARCTION (NSTEMI): Primary | ICD-10-CM

## 2025-03-24 PROCEDURE — 93798 PHYS/QHP OP CAR RHAB W/ECG: CPT

## 2025-03-25 ENCOUNTER — OFFICE VISIT (OUTPATIENT)
Dept: GASTROENTEROLOGY | Facility: CLINIC | Age: 70
End: 2025-03-25
Payer: MEDICARE

## 2025-03-25 ENCOUNTER — RESULTS FOLLOW-UP (OUTPATIENT)
Dept: GASTROENTEROLOGY | Facility: CLINIC | Age: 70
End: 2025-03-25

## 2025-03-25 VITALS
HEART RATE: 90 BPM | WEIGHT: 171.63 LBS | DIASTOLIC BLOOD PRESSURE: 76 MMHG | HEIGHT: 68 IN | SYSTOLIC BLOOD PRESSURE: 131 MMHG | OXYGEN SATURATION: 96 % | BODY MASS INDEX: 26.01 KG/M2

## 2025-03-25 DIAGNOSIS — K51.919 ULCERATIVE COLITIS WITH COMPLICATION, UNSPECIFIED LOCATION: ICD-10-CM

## 2025-03-25 DIAGNOSIS — K51.319 CHRONIC ULCERATIVE RECTOSIGMOIDITIS WITH COMPLICATION: Primary | ICD-10-CM

## 2025-03-25 PROCEDURE — 99999 PR PBB SHADOW E&M-EST. PATIENT-LVL IV: CPT | Mod: PBBFAC,,, | Performed by: NURSE PRACTITIONER

## 2025-03-25 PROCEDURE — 99214 OFFICE O/P EST MOD 30 MIN: CPT | Mod: PBBFAC | Performed by: NURSE PRACTITIONER

## 2025-03-25 PROCEDURE — 99214 OFFICE O/P EST MOD 30 MIN: CPT | Mod: S$PBB,,, | Performed by: NURSE PRACTITIONER

## 2025-03-25 NOTE — PROGRESS NOTES
Isabella Kelly is a 69 y.o. female here for Follow-up        PCP: Edy Villalpando  Referring Provider: No referring provider defined for this encounter.     HPI:  Presents in follow up due to Ulcerative colitis. Patient has had two IV induction doses of Skyrizi. She is due for the next infusion in 2 weeks. She will then transition to SQ injections. Patient denies any GI complaints. No abdominal pain, diarrhea, hematochezia, or melena.Appetite is good. No unintentional weight loss.  Patient continues to report joint pain and swelling in her hands as well as pain in the shoulders and deltoid region of her arms.  States that she has not been able to wear her rings due to swelling in her hands.  Labs from 01/02/2025 reviewed, HGB 11.2 and HCT 35.1.Digital rectal exam revealed no mass.  The colon was examined from the rectum to the cecum and no inflammation was seen.  The rectum mucosa was scarred consistent with controlled ulcerative colitis.  Diverticula were present in the descending and sigmoid colon.  Diminutive pseudopolyps were present in the descending colon. No colon biopsies were obtained due to Brilinta therapy.  The patient was previously receiving Remicade infusions.Patient has previously failed mesalamine, budesonide, zeposia, and now Entyvio. Patient had NSTEMI in November. Is currently taking Brilinta. Does not take NSAID's. B 12 normal in September. Hep B surface antibody and core antibody negative in January. TB Gold negative.    Follow-up  Associated symptoms include arthralgias and myalgias. Pertinent negatives include no abdominal pain, change in bowel habit, chest pain, fatigue, fever, joint swelling, nausea or vomiting.         ROS:  Review of Systems   Constitutional:  Negative for appetite change, fatigue, fever and unexpected weight change.   HENT:  Negative for trouble swallowing.    Cardiovascular:  Negative for chest pain.   Gastrointestinal:  Negative for abdominal pain, blood in  stool, change in bowel habit, constipation, diarrhea, nausea, vomiting and reflux.   Musculoskeletal:  Positive for arthralgias and myalgias. Negative for back pain, gait problem and joint swelling.   Integumentary:  Negative for pallor.   Psychiatric/Behavioral:  The patient is not nervous/anxious.           PMHX:  has a past medical history of Acquired hypothyroidism (01/13/2023), CAD S/P percutaneous coronary angioplasty (11/22/2024), Depressive disorder (09/2004), Esophageal reflux, Heart attack (11/22/2024), Hyperlipidemia (10/26/2011), Hypertension, IBD (inflammatory bowel disease), Iron deficiency anemia due to chronic blood loss (12/19/2022), PONV (postoperative nausea and vomiting), Post-hysterectomy menopause, Stress incontinence, Ulcerative colitis (08/2020), and Uterine prolapse.    PSHX:  has a past surgical history that includes anterior/posterior colporrhaphy with vaginal enterocele repair (06/18/2008); Appendectomy; Colonoscopy; Dilation and curettage of uterus; Hysteroscopy (11/28/2007); Endometrial biopsy (02/16/2006); ENDOMETRIAL BIOPSY REPEAT  (11/06/2007); SILVER NITRATE CAUTERY OF VAGINAL WALL (08/12/2008); SIS (11/15/2007); Total abdominal hysterectomy; TOT SLING (06/18/2008); ANGIOGRAM, CORONARY, WITH LEFT HEART CATHETERIZATION (N/A, 11/22/2024); percutaneous coronary intervention, artery (N/A, 11/22/2024); and heart stent (11/22/2024).    PFHX: Family history is unknown by patient.    PSlHX:  reports that she has never smoked. She has never used smokeless tobacco. She reports that she does not drink alcohol and does not use drugs.        Review of patient's allergies indicates:  No Known Allergies    Medication List with Changes/Refills   Current Medications    ASPIRIN 81 MG CHEW    Take 1 tablet (81 mg total) by mouth once daily.    ATORVASTATIN (LIPITOR) 80 MG TABLET    Take 1 tablet (80 mg total) by mouth once daily.    ESTRADIOL (ESTRACE) 1 MG TABLET    Take 1 tablet (1 mg total) by  "mouth once daily.    FOLIC ACID (FOLVITE) 1 MG TABLET    TAKE 1 TABLET BY MOUTH EVERY DAY    LEVOTHYROXINE (SYNTHROID) 100 MCG TABLET    Take 100 mcg by mouth before breakfast.    LISINOPRIL (PRINIVIL,ZESTRIL) 5 MG TABLET    Take 1 tablet (5 mg total) by mouth once daily.    METOPROLOL SUCCINATE (TOPROL-XL) 25 MG 24 HR TABLET    Take 25 mg by mouth once daily.    NITROGLYCERIN (NITROSTAT) 0.4 MG SL TABLET    Place 1 tablet (0.4 mg total) under the tongue every 5 (five) minutes as needed for Chest pain (for a max of 3 tabs in 15 minutes).    OMEPRAZOLE (PRILOSEC) 40 MG CAPSULE    Take 40 mg by mouth 2 (two) times daily.    RISANKIZUMAB-RZAA 180 MG/1.2 ML (150 MG/ML) INJT    Inject 180 mg into the skin every 8 weeks. for 6 doses    TICAGRELOR (BRILINTA) 90 MG TABLET    Take 1 tablet (90 mg total) by mouth 2 (two) times daily.        Objective Findings:  Vital Signs:  /76   Pulse 90   Ht 5' 8" (1.727 m)   Wt 77.8 kg (171 lb 9.6 oz)   SpO2 96%   BMI 26.09 kg/m²  Body mass index is 26.09 kg/m².    Physical Exam:  Physical Exam  Vitals and nursing note reviewed.   Constitutional:       General: She is not in acute distress.     Appearance: Normal appearance.   HENT:      Mouth/Throat:      Mouth: Mucous membranes are moist.   Cardiovascular:      Rate and Rhythm: Normal rate.   Pulmonary:      Effort: Pulmonary effort is normal.   Abdominal:      General: Bowel sounds are normal. There is no distension.      Palpations: Abdomen is soft. There is no mass.      Tenderness: There is no abdominal tenderness.   Skin:     General: Skin is warm and dry.      Coloration: Skin is not jaundiced or pale.   Neurological:      Mental Status: She is alert and oriented to person, place, and time.   Psychiatric:         Mood and Affect: Mood normal.          Labs:  Lab Results   Component Value Date    WBC 7.50 01/02/2025    HGB 11.2 (L) 01/02/2025    HCT 35.1 (L) 01/02/2025    MCV 95.6 01/02/2025    RDW 13.6 01/02/2025    PLT " "314 01/02/2025    LYMPH 22.7 (L) 01/02/2025    LYMPH 1.70 01/02/2025    MONO 8.5 (H) 01/02/2025    EOS 0.20 01/02/2025    BASO 0.02 01/02/2025     Lab Results   Component Value Date     (L) 11/22/2024    K 3.6 11/22/2024     11/22/2024    CO2 22 (L) 11/22/2024     11/22/2024    BUN 12 11/22/2024    CREATININE 0.72 11/22/2024    CALCIUM 8.3 (L) 11/22/2024    PROT 6.7 11/22/2024    ALBUMIN 2.9 (L) 11/22/2024    BILITOT 0.6 11/22/2024    ALKPHOS 102 11/22/2024    AST 43 (H) 11/22/2024    ALT 34 11/22/2024         Imaging: X-Ray Elbow 2 Views Right  Result Date: 3/10/2025  See Procedure Notes for results. IMPRESSION: Please see Ortho procedure notes for report.  This procedure was auto-finalized by: Virtual Radiologist        Assessment:  Isabella Kelly is a 69 y.o. female here with:  1. Chronic ulcerative rectosigmoiditis with complication    2. Ulcerative colitis with complication, unspecified location          Recommendations:  1. CBC, Iron studies, CMP, CRP, SERENA  2. Continue Skyrizi  3. Avoid NSAID's  4. Will plan repeat colonoscopy in November when patient may hold Brilinta    Portions of this note may have been created with voice recognition software.  Occasional wrong word or "sound a like substitutions may have occurred due to inherent limitations of voice recognition software.  Please read the note carefully and recognize using contexts, where substitutions have occurred.    Diagnosis, risks, benefits, and side effects of any medications and treatment plan were discussed with the patient.  All questions were answered to the satisfaction of the patient, and patient verbalized understanding and agreement to the treatment plan.        Follow up in about 4 months (around 7/25/2025).      Order summary:  Orders Placed This Encounter    CBC Auto Differential    Iron and TIBC    Ferritin    Comprehensive Metabolic Panel    Miscellaneous Test, Sendout CRP    SERENA EIA w/ Reflex to dsDNA/ZAHEER "       Thank you for allowing me to participate in the care of Isabella Kelly.      LORA NevilleC

## 2025-03-26 ENCOUNTER — CLINICAL SUPPORT (OUTPATIENT)
Facility: HOSPITAL | Age: 70
End: 2025-03-26
Attending: INTERNAL MEDICINE
Payer: MEDICARE

## 2025-03-26 DIAGNOSIS — I21.4 NSTEMI (NON-ST ELEVATION MYOCARDIAL INFARCTION): Primary | ICD-10-CM

## 2025-03-26 PROCEDURE — 93798 PHYS/QHP OP CAR RHAB W/ECG: CPT

## 2025-03-27 ENCOUNTER — CLINICAL SUPPORT (OUTPATIENT)
Facility: HOSPITAL | Age: 70
End: 2025-03-27
Attending: INTERNAL MEDICINE
Payer: MEDICARE

## 2025-03-27 DIAGNOSIS — I21.4 NSTEMI (NON-ST ELEVATION MYOCARDIAL INFARCTION): Primary | ICD-10-CM

## 2025-03-27 PROCEDURE — 93798 PHYS/QHP OP CAR RHAB W/ECG: CPT

## 2025-03-28 DIAGNOSIS — K51.919 ULCERATIVE COLITIS WITH COMPLICATION, UNSPECIFIED LOCATION: ICD-10-CM

## 2025-03-28 DIAGNOSIS — M19.90 INFLAMMATORY ARTHRITIS: Primary | ICD-10-CM

## 2025-03-31 ENCOUNTER — CLINICAL SUPPORT (OUTPATIENT)
Facility: HOSPITAL | Age: 70
End: 2025-03-31
Attending: INTERNAL MEDICINE
Payer: MEDICARE

## 2025-03-31 DIAGNOSIS — I21.4 NSTEMI (NON-ST ELEVATION MYOCARDIAL INFARCTION): Primary | ICD-10-CM

## 2025-03-31 PROCEDURE — 93798 PHYS/QHP OP CAR RHAB W/ECG: CPT

## 2025-04-02 ENCOUNTER — CLINICAL SUPPORT (OUTPATIENT)
Facility: HOSPITAL | Age: 70
End: 2025-04-02
Attending: INTERNAL MEDICINE
Payer: MEDICARE

## 2025-04-02 DIAGNOSIS — I21.4 NSTEMI (NON-ST ELEVATION MYOCARDIAL INFARCTION): Primary | ICD-10-CM

## 2025-04-02 PROCEDURE — 93798 PHYS/QHP OP CAR RHAB W/ECG: CPT

## 2025-04-02 NOTE — PROGRESS NOTES
Patient arrived for cardiac rehab session. Pt reports to have eaten prior to exercise session.  The patient was on continuous EKG monitoring throughout the session. The patient tolerated exercise session well with no complaints.       Winlevi Pregnancy And Lactation Text: This medication is considered safe during pregnancy and breastfeeding.

## 2025-04-07 ENCOUNTER — CLINICAL SUPPORT (OUTPATIENT)
Facility: HOSPITAL | Age: 70
End: 2025-04-07
Attending: INTERNAL MEDICINE
Payer: MEDICARE

## 2025-04-07 DIAGNOSIS — I21.4 NON-ST ELEVATION MYOCARDIAL INFARCTION (NSTEMI): Primary | ICD-10-CM

## 2025-04-07 PROCEDURE — 93798 PHYS/QHP OP CAR RHAB W/ECG: CPT

## 2025-04-08 ENCOUNTER — PATIENT MESSAGE (OUTPATIENT)
Dept: GASTROENTEROLOGY | Facility: CLINIC | Age: 70
End: 2025-04-08
Payer: MEDICARE

## 2025-04-09 ENCOUNTER — CLINICAL SUPPORT (OUTPATIENT)
Facility: HOSPITAL | Age: 70
End: 2025-04-09
Attending: INTERNAL MEDICINE
Payer: MEDICARE

## 2025-04-09 DIAGNOSIS — I21.4 NSTEMI (NON-ST ELEVATION MYOCARDIAL INFARCTION): Primary | ICD-10-CM

## 2025-04-09 PROCEDURE — 93798 PHYS/QHP OP CAR RHAB W/ECG: CPT

## 2025-04-10 ENCOUNTER — INFUSION (OUTPATIENT)
Dept: INFUSION THERAPY | Facility: HOSPITAL | Age: 70
End: 2025-04-10
Attending: NURSE PRACTITIONER
Payer: MEDICARE

## 2025-04-10 ENCOUNTER — CLINICAL SUPPORT (OUTPATIENT)
Facility: HOSPITAL | Age: 70
End: 2025-04-10
Attending: INTERNAL MEDICINE
Payer: MEDICARE

## 2025-04-10 VITALS
OXYGEN SATURATION: 99 % | DIASTOLIC BLOOD PRESSURE: 72 MMHG | SYSTOLIC BLOOD PRESSURE: 134 MMHG | HEART RATE: 79 BPM | WEIGHT: 171 LBS | RESPIRATION RATE: 17 BRPM | BODY MASS INDEX: 26 KG/M2

## 2025-04-10 DIAGNOSIS — I21.4 NSTEMI (NON-ST ELEVATION MYOCARDIAL INFARCTION): Primary | ICD-10-CM

## 2025-04-10 DIAGNOSIS — K51.919 ULCERATIVE COLITIS WITH COMPLICATION, UNSPECIFIED LOCATION: Primary | ICD-10-CM

## 2025-04-10 PROCEDURE — 96366 THER/PROPH/DIAG IV INF ADDON: CPT

## 2025-04-10 PROCEDURE — 96365 THER/PROPH/DIAG IV INF INIT: CPT

## 2025-04-10 PROCEDURE — 93798 PHYS/QHP OP CAR RHAB W/ECG: CPT

## 2025-04-10 PROCEDURE — 25000003 PHARM REV CODE 250: Performed by: NURSE PRACTITIONER

## 2025-04-10 PROCEDURE — 63600175 PHARM REV CODE 636 W HCPCS: Mod: JZ,TB | Performed by: NURSE PRACTITIONER

## 2025-04-10 RX ORDER — HEPARIN 100 UNIT/ML
500 SYRINGE INTRAVENOUS
Status: DISCONTINUED | OUTPATIENT
Start: 2025-04-10 | End: 2025-04-10

## 2025-04-10 RX ORDER — METHYLPREDNISOLONE SOD SUCC 125 MG
125 VIAL (EA) INJECTION
Status: DISCONTINUED | OUTPATIENT
Start: 2025-04-10 | End: 2025-04-10

## 2025-04-10 RX ORDER — SODIUM CHLORIDE 0.9 % (FLUSH) 0.9 %
10 SYRINGE (ML) INJECTION
Status: CANCELLED | OUTPATIENT
Start: 2025-04-10

## 2025-04-10 RX ORDER — EPINEPHRINE 0.3 MG/.3ML
0.3 INJECTION SUBCUTANEOUS ONCE AS NEEDED
Status: DISCONTINUED | OUTPATIENT
Start: 2025-04-10 | End: 2025-04-10

## 2025-04-10 RX ORDER — EPINEPHRINE 0.3 MG/.3ML
0.3 INJECTION SUBCUTANEOUS ONCE AS NEEDED
Status: CANCELLED | OUTPATIENT
Start: 2025-04-10

## 2025-04-10 RX ORDER — DIPHENHYDRAMINE HYDROCHLORIDE 50 MG/ML
50 INJECTION, SOLUTION INTRAMUSCULAR; INTRAVENOUS ONCE AS NEEDED
Status: DISCONTINUED | OUTPATIENT
Start: 2025-04-10 | End: 2025-04-10

## 2025-04-10 RX ORDER — DIPHENHYDRAMINE HYDROCHLORIDE 50 MG/ML
50 INJECTION, SOLUTION INTRAMUSCULAR; INTRAVENOUS ONCE AS NEEDED
Status: CANCELLED | OUTPATIENT
Start: 2025-04-10

## 2025-04-10 RX ORDER — SODIUM CHLORIDE 0.9 % (FLUSH) 0.9 %
10 SYRINGE (ML) INJECTION
Status: DISCONTINUED | OUTPATIENT
Start: 2025-04-10 | End: 2025-04-10

## 2025-04-10 RX ORDER — METHYLPREDNISOLONE SOD SUCC 125 MG
125 VIAL (EA) INJECTION
Status: CANCELLED | OUTPATIENT
Start: 2025-04-10

## 2025-04-10 RX ORDER — HEPARIN 100 UNIT/ML
500 SYRINGE INTRAVENOUS
Status: CANCELLED | OUTPATIENT
Start: 2025-04-10

## 2025-04-10 RX ADMIN — DEXTROSE MONOHYDRATE 1200 MG: 50 INJECTION, SOLUTION INTRAVENOUS at 11:04

## 2025-04-10 NOTE — PROGRESS NOTES
1135 Pt here for Skyrizi infusion, resting in recliner, TP released and pharmacy notified  1159 Skyrizi infusion started to to infuse over 2 hrs  1409 Skyrizi infusion complete, tolerated well, will continue to monitor  1430 pt discharged ambulatory with no adverse reaction noted, pt notified to go to ER with any adverse reactions, AVS given along with medication information  Pt is set up for home injections following this infusion, this completes pt therapy plan

## 2025-04-14 ENCOUNTER — CLINICAL SUPPORT (OUTPATIENT)
Facility: HOSPITAL | Age: 70
End: 2025-04-14
Attending: INTERNAL MEDICINE
Payer: MEDICARE

## 2025-04-14 DIAGNOSIS — I21.4 NSTEMI (NON-ST ELEVATION MYOCARDIAL INFARCTION): Primary | ICD-10-CM

## 2025-04-14 PROCEDURE — 93798 PHYS/QHP OP CAR RHAB W/ECG: CPT

## 2025-04-16 ENCOUNTER — CLINICAL SUPPORT (OUTPATIENT)
Facility: HOSPITAL | Age: 70
End: 2025-04-16
Attending: INTERNAL MEDICINE
Payer: MEDICARE

## 2025-04-16 DIAGNOSIS — I21.4 NSTEMI (NON-ST ELEVATION MYOCARDIAL INFARCTION): Primary | ICD-10-CM

## 2025-04-16 PROCEDURE — 93798 PHYS/QHP OP CAR RHAB W/ECG: CPT

## 2025-04-21 ENCOUNTER — CLINICAL SUPPORT (OUTPATIENT)
Facility: HOSPITAL | Age: 70
End: 2025-04-21
Attending: INTERNAL MEDICINE
Payer: MEDICARE

## 2025-04-21 DIAGNOSIS — R73.9 BLOOD GLUCOSE ELEVATED: ICD-10-CM

## 2025-04-21 DIAGNOSIS — Z98.61 CORONARY ANGIOPLASTY STATUS: Primary | ICD-10-CM

## 2025-04-21 DIAGNOSIS — E78.00 HYPERCHOLESTEREMIA: ICD-10-CM

## 2025-04-21 PROCEDURE — 93798 PHYS/QHP OP CAR RHAB W/ECG: CPT

## 2025-04-23 ENCOUNTER — CLINICAL SUPPORT (OUTPATIENT)
Facility: HOSPITAL | Age: 70
End: 2025-04-23
Attending: INTERNAL MEDICINE
Payer: MEDICARE

## 2025-04-23 DIAGNOSIS — I21.4 NSTEMI (NON-ST ELEVATION MYOCARDIAL INFARCTION): Primary | ICD-10-CM

## 2025-04-23 PROCEDURE — 93798 PHYS/QHP OP CAR RHAB W/ECG: CPT

## 2025-04-24 ENCOUNTER — CLINICAL SUPPORT (OUTPATIENT)
Facility: HOSPITAL | Age: 70
End: 2025-04-24
Attending: INTERNAL MEDICINE
Payer: MEDICARE

## 2025-04-24 DIAGNOSIS — I21.4 NSTEMI (NON-ST ELEVATION MYOCARDIAL INFARCTION): Primary | ICD-10-CM

## 2025-04-24 PROCEDURE — 93798 PHYS/QHP OP CAR RHAB W/ECG: CPT

## 2025-04-25 ENCOUNTER — TELEPHONE (OUTPATIENT)
Dept: CARDIOLOGY | Facility: HOSPITAL | Age: 70
End: 2025-04-25
Payer: MEDICARE

## 2025-04-28 ENCOUNTER — HOSPITAL ENCOUNTER (OUTPATIENT)
Dept: CARDIOLOGY | Facility: HOSPITAL | Age: 70
Discharge: HOME | End: 2025-04-28
Attending: INTERNAL MEDICINE
Payer: MEDICARE

## 2025-04-28 VITALS — DIASTOLIC BLOOD PRESSURE: 100 MMHG | HEART RATE: 110 BPM | SYSTOLIC BLOOD PRESSURE: 136 MMHG

## 2025-04-28 DIAGNOSIS — Z98.61 CORONARY ANGIOPLASTY STATUS: ICD-10-CM

## 2025-04-28 PROCEDURE — 93017 CV STRESS TEST TRACING ONLY: CPT

## 2025-04-28 PROCEDURE — 93018 CV STRESS TEST I&R ONLY: CPT | Mod: ,,, | Performed by: INTERNAL MEDICINE

## 2025-04-28 PROCEDURE — 93016 CV STRESS TEST SUPVJ ONLY: CPT | Mod: ,,, | Performed by: INTERNAL MEDICINE

## 2025-04-29 ENCOUNTER — CLINICAL SUPPORT (OUTPATIENT)
Facility: HOSPITAL | Age: 70
End: 2025-04-29
Attending: INTERNAL MEDICINE
Payer: MEDICARE

## 2025-04-29 DIAGNOSIS — I21.4 NSTEMI (NON-ST ELEVATION MYOCARDIAL INFARCTION): Primary | ICD-10-CM

## 2025-04-29 LAB
CV STRESS BASE HR: 110 BPM
DIASTOLIC BLOOD PRESSURE: 100 MMHG
OHS CV CPX 1 MINUTE RECOVERY HEART RATE: 114 BPM
OHS CV CPX 85 PERCENT MAX PREDICTED HEART RATE MALE: 129
OHS CV CPX ESTIMATED METS: 10.1
OHS CV CPX MAX PREDICTED HEART RATE: 152
OHS CV CPX PATIENT IS FEMALE: 1
OHS CV CPX PATIENT IS MALE: 0
OHS CV CPX PEAK DIASTOLIC BLOOD PRESSURE: 80 MMHG
OHS CV CPX PEAK HEAR RATE: 144 BPM
OHS CV CPX PEAK RATE PRESSURE PRODUCT: NORMAL
OHS CV CPX PEAK SYSTOLIC BLOOD PRESSURE: 193 MMHG
OHS CV CPX PERCENT MAX PREDICTED HEART RATE ACHIEVED: 99
OHS CV CPX RATE PRESSURE PRODUCT PRESENTING: NORMAL
STRESS ECHO POST EXERCISE DUR MIN: 13 MINUTES
STRESS ECHO POST EXERCISE DUR SEC: 27 SECONDS
SYSTOLIC BLOOD PRESSURE: 136 MMHG

## 2025-05-02 VITALS
DIASTOLIC BLOOD PRESSURE: 70 MMHG | HEART RATE: 90 BPM | OXYGEN SATURATION: 97 % | RESPIRATION RATE: 18 BRPM | SYSTOLIC BLOOD PRESSURE: 128 MMHG

## 2025-05-02 NOTE — PROGRESS NOTES
Cardiac Rehab Individual Treatment Plan - Discharge Assessment      Patient Name: Isabella Kelly MRN: [id number]   : 1955   Age: 69 y.o.   Diagnosis: NSTEMI    Psychosocial Assessment:   Outcome Survey Tools:    JAGRUTI SCORES:     TOTAL      Anxiety 0    Depression 0    Somatization 2    Hostility 0               SF-36 SCORES:        2025   SF 36 HEALTH QUESTIONNAIRE   Physical Functioning 75 100   Role limitations due to physical health 0 100   Role limitations due to emotional problems 100 100   Energy/fatigue 35 60   Emotional well-being 80 96   Social functioning 100 100   Pain 32.5 77.5   General health 75 80   In general, how would you say your health is? good very good   Compared to one year ago, how would you rate your health in general now? somewhat worse now than one year ago much better now than one year ago   VIGOROUS ACTIVITIES (running, lifting heavy objects, strenuous sport) yes, limited a little no, not limited at all   MODERATE ACTIVITES (moving a table, vacuuming, bowling or golf) yes, limited a little no, not limited at all   Lifting or carrying groceries No not limited No not limited   Climbing SEVERAL flights of stairs Yes limited a little No not limited   Climbing ONE flight of stairs no, not limited at all no, not limited at all   Bending or kneeling no, not limited at all no, not limited at all   Walking more than a mile yes, limited a little no, not limited at all   Walking several blocks yes, limited a little no, not limited at all   Walking one block No not limited No not limited   Bathing and dressing yourself no, not limited at all no, not limited at all   Cut down on the amount of time you spent on work or other activities yes no   Accomplished less than you would have liked yes no   Were limited in the kind of work or other activities yes no   Had difficulty performing the work or other activities (i.e. took extra effort) yes no   Cut down on the amount of  time you spent on work or other activities no no   Accomplished less than you would like no no   Did not do work or other activities as carefully as usual no no   During the past four weeks, to what extent has your physical health or emotional problems interfered with your normal social activities with family, friends, neighbors or groups? not at all not at all   How much bodily pain have you had in the last four weeks? moderate very mild   During the past four weeks, how much did pain interfere with your normal work (including work both outside the home and housework)? quite a bit slightly   Did you feel full of pep? some of the time most of the time   Have you been a very nervous person? none of the time none of the time   Have you felt so down in the dumps that nothing could cheer you up? a little bit of the time none of the time   Have you felt calm and peaceful? good bit of the time most of the time   Did you have a lot of energy? a little bit of the time good bit of the time   Have you felt downhearted and blue? none of the time none of the time   Did you feel worn out? good bit of the time some of the time   Have you been a happy person? good bit of the time all of the time   Did you feel tired? good bit of the time good bit of the time   Has your health limited your social activities such as visiting relatives or friends? none of the time none of the time   I seem to get ill more easily than other people. mostly false mostly false   I am as healthy as anybody I know. mostly true mostly true   I expect my health to get worse. definitely false definitely false   My health is excellent. mostly true mostly true              PHQ 9:      1/24/2025    10:00 AM 1/28/2025     2:42 PM 4/29/2025    10:30 AM   PHQ-9 Depression Patient Health Questionnaire   Over the last two weeks how often have you been bothered by little interest or pleasure in doing things 0 0 0   Over the last two weeks how often have you been  bothered by feeling down, depressed or hopeless 0 0 0   Over the last two weeks how often have you been bothered by trouble falling or staying asleep, or sleeping too much 0 0 0   Over the last two weeks how often have you been bothered by feeling tired or having little energy 1 0 0   Over the last two weeks how often have you been bothered by a poor appetite or overeating 0 1 0   Over the last two weeks how often have you been bothered by feeling bad about yourself - or that you are a failure or have let yourself or your family down 0 0 0   Over the last two weeks how often have you been bothered by trouble concentrating on things, such as reading the newspaper or watching television 0 0 0   Over the last two weeks how often have you been bothered by moving or speaking so slowly that other people could have noticed. 0 0 0   Over the last two weeks how often have you been bothered by thoughts that you would be better off dead, or of hurting yourself 0 0 0   If you checked off any problems, how difficult have these problems made it for you to do your work, take care of things at home or get along with other people? Somewhat difficult Somewhat difficult Not difficult at all   PHQ-9 Score 1 1 0            Family Support: children, Hinduism members, family, and spouse  Self Reported: Effective Coping Skills  Displays: socializes with others, happiness, smiles often, and calmness    Psychosocial Plan:   Goals:  Maintain positive support system: Met  Maintain positive outlook: Met  Improve overall quality of life: Met    Comments on Goal Progression:  Pt has retained a positive attitude through the entire program. They have met their goals.    Interventions/Recommendations:  Discussed Results of Surveys: Yes  Notify MD: No  Program Referral: No  Pharmaceutical Intervention/Therapy: No  Physical Activity: Yes  Continue Patient Education: No  Other Needs: not applicable  Stage of Readiness to Change:  Maintenance    Education:  Advanced Directives; verbalizes understanding; Date: 04/03/25  Sexuality; verbalizes understanding; Date: 02/05/25  Stress Management; verbalizes understanding; Date: 02/12/25  Relaxation Techniques; verbalizes understanding; Date: 02/17/25  Stress; verbalizes understanding; Date: 02/12/25      Patient has been instructed to seek attention via PCP/Psychiatry in the event that circumstances change. Patient verbalizes understanding.     Other Core Components/Risk Factors Assessment:   RISK FACTORS:  hyperlipidemia, hypertension, positive family history    Learning Barriers: None    Education Level:  Associate/Bachelor Degree    Pre-Test Score Post-Test Score   80% 80%     Medication Compliance: has been compliant with taking medications    Other Core Components/Risk Factors Plan:   Goals:  Decrease cholesterol level: Met  Increase exercise tolerance: Met  Increase knowledge of CAD: Met  Decrease blood pressure: Met  Weight loss: Met  Learn more about healthy eating: Met    Comments on Goal Progression:  Pt is aware of current risk factors. They have done well to mitigate them as well as to improve their lifestyle modifiers.    Interventions/Recommendations:  Individual Education/Counseling: Yes  Physician Referral: No    Education:    blood pressure meds, verbalizes understanding; Date: 03/27/25  cardiac interventions, verbalizes understanding; Date: 03/20/25  cholesterol meds, verbalizes understanding; Date: 04/10/25  fluid overload/CHF, verbalizes understanding; Date: 02/26/25  hypertension, verbalizes understanding; Date: 02/05/25  nutrition, verbalizes understanding; Date: 03/03/25  physical activity, verbalizes understanding; Date: 02/06/25  risk factors, verbalizes understanding; Date: 03/17/25  stress, verbalizes understanding; Date: 02/12/25          Other Core Components/Hypertension Assessment:   BP Range: 130/70  Patient reported symptoms: none    Medications:  Medication  Prescribed? Adherent? Exception   Beta-blocker []Yes  []No  []Unknown []Yes  []No  []Unknown    ACEI/ARB []Yes  []No  []Unknown []Yes  []No  []Unknown    Calcium Channel Blocker []Yes  []No  []Unknown []Yes  []No  []Unknown    Diuretic []Yes  []No  []Unknown []Yes  []No  []Unknown        Other Core Components/Hypertension Plan:   Goals:  Blood Pressure <130/80: Met    Comments on Goal Progression:  Pt sporadically hypertensive but remained normotensive through the bulk of their exercise sessions.    Interventions/Recommendations:  Encourage medication compliance  Encourage sodium reduction  Encourage weight loss  Recommend physical activity  Educate on contributory factors  Reduce stress, anxiety, anger, depression, and/or chronic pain  Encourage home blood pressure monitoring  Recommend daily weights    Comments on Goal Progression:  See monthly report in Epic for blood pressure trends      Education:    Hypertension; verbalizes understanding; Date: 02/05/25  Coronary Artery Disease; verbalizes understanding; Date: 03/20/25  Congestive Heart Failure; verbalizes understanding; Date: 02/26/25  Risk Factors; verbalizes understanding; Date: 03/17/25  Medication I; verbalizes understanding; Date: 03/27/25  Medication II; verbalizes understanding; Date: 04/10/25  Stroke; verbalizes understanding; Date: 03/05/25  Stress; verbalizes understanding; Date: 02/12/25            Does the patient have Heart Failure?    NO    Other Core Components/Tobacco Cessation Assessment:   Smoking Status: Lifetime Non-smoker  Primary Tobacco Type: N/A  Tobacco Usage: no  Smoking Cessation Barriers:   Stage of Readiness to Change: Maintenance    Other Core Components/Tobacco Cessation Plan:   Goals:  Maintain non-smoking status: Met    Comments on Goal Progression:  Pt has not had any issues maintaining this goal.    Interventions/Recommendations:  Maintains non-smoking status    Education:              Comments:       Patient has been  instructed to follow up with Cardiologist for any further cardiac issues.

## 2025-07-25 ENCOUNTER — OFFICE VISIT (OUTPATIENT)
Dept: CARDIOLOGY | Facility: CLINIC | Age: 70
End: 2025-07-25
Payer: MEDICARE

## 2025-07-25 VITALS
HEART RATE: 65 BPM | OXYGEN SATURATION: 99 % | BODY MASS INDEX: 27.89 KG/M2 | SYSTOLIC BLOOD PRESSURE: 128 MMHG | WEIGHT: 184 LBS | DIASTOLIC BLOOD PRESSURE: 78 MMHG | HEIGHT: 68 IN

## 2025-07-25 DIAGNOSIS — R00.0 SINUS TACHYCARDIA: Primary | ICD-10-CM

## 2025-07-25 DIAGNOSIS — R06.02 SOB (SHORTNESS OF BREATH) ON EXERTION: ICD-10-CM

## 2025-07-25 DIAGNOSIS — Z98.61 CAD S/P PERCUTANEOUS CORONARY ANGIOPLASTY: ICD-10-CM

## 2025-07-25 DIAGNOSIS — I25.10 CAD S/P PERCUTANEOUS CORONARY ANGIOPLASTY: ICD-10-CM

## 2025-07-25 DIAGNOSIS — I10 PRIMARY HYPERTENSION: ICD-10-CM

## 2025-07-25 PROCEDURE — 99999 PR PBB SHADOW E&M-EST. PATIENT-LVL V: CPT | Mod: PBBFAC,,, | Performed by: INTERNAL MEDICINE

## 2025-07-25 PROCEDURE — 99215 OFFICE O/P EST HI 40 MIN: CPT | Mod: PBBFAC | Performed by: INTERNAL MEDICINE

## 2025-07-25 PROCEDURE — 99214 OFFICE O/P EST MOD 30 MIN: CPT | Mod: S$PBB,,, | Performed by: INTERNAL MEDICINE

## 2025-07-25 NOTE — PROGRESS NOTES
PCP: Edy Villalpando DO    Referring Provider:     Subjective:   Isabella Kelly is a 69 y.o. female with hx of HTN, HLD, hypothyroidism, GERD, ulcerative colitis, IBS, depression who presents for evaluation of CAD post PCI with ERASMO LAD. Pt had atypical chest pain, rapid heart rate, sought care, found to have elevated troponins, underwent LHC, NSTEMI s/p LHC with PCI to mid LAD.  Echo showed EF of 60%.  Patient is walking on treadmill thiry minutes at least three days a week without provocation of chest pain, pressure or shortness of breath. She denies any chest pain.  . She has completed cardiac rehab.  Has rare episodes of palpitations, once every other month or so, has for years, do not interfear with normal activity.         Fhx:   Family History   Family history unknown: Yes   Mom  of old age at 93, dementia, Dad  at 46 due to IC bleed.     Shx:  Never smoker, no EtOH or drug use    EKG   Results for orders placed or performed in visit on 12/10/24   EKG 12-lead    Collection Time: 12/10/24  8:43 AM   Result Value Ref Range    QRS Duration 80 ms    [facility] QTC Calculation 435 ms    Narrative    Test Reason : I10,    Vent. Rate :  74 BPM     Atrial Rate :  74 BPM     P-R Int : 202 ms          QRS Dur :  80 ms      QT Int : 392 ms       P-R-T Axes :  71  42  58 degrees    QTcB Int : 435 ms    Normal sinus rhythm  Normal ECG  When compared with ECG of 2024 07:41,  Minimal criteria for Anterior infarct are no longer Present  Confirmed by Farhan Forbes ([id number]) on 2024 1:34:41 PM    Referred By:            Confirmed By: Farhan Forbes     ECHO Results for orders placed during the hospital encounter of 24    Echo    Interpretation Summary    Left Ventricle: The left ventricle is normal in size. Mildly increased wall thickness. There is concentric remodeling. There is normal systolic function. Biplane (2D) method of discs ejection fraction is 60%. There is normal diastolic function.     Right Ventricle: Normal right ventricular cavity size. Systolic function is normal.    Tricuspid Valve: There is mild regurgitation.    Pulmonary Artery: The estimated pulmonary artery systolic pressure is 23 mmHg.    IVC/SVC: Normal venous pressure at 3 mmHg.    [facility] Results for orders placed during the hospital encounter of 11/21/24    Cardiac catheterization    Conclusion    The Mid LAD lesion was 70% stenosed with 0% stenosis post-intervention.    The ejection fraction was calculated to be 65%.    The left ventricular systolic function was normal.    The left ventricular end diastolic pressure was normal.    The pre-procedure left ventricular end diastolic pressure was 6.    Mid LAD lesion: A stent was successfully placed.    The estimated blood loss was none.    There was single vessel coronary artery disease.    The procedure log was documented by Documenter: Kassidy Romeo RN and verified by Raphael Chaves DO.    Date: 11/22/2024  Time: 4:29 PM    Normal LV systolic function, est EF 65%  Severe single vessel disease undergoing successful PCI with ERASMO mid LAD        Lab Results   Component Value Date     03/25/2025    K 4.0 03/25/2025     03/25/2025    CO2 25 03/25/2025    BUN 11 03/25/2025    CREATININE 0.76 03/25/2025    CALCIUM 9.4 03/25/2025    ANIONGAP 12 03/25/2025    ESTGFRAFRICA 74 05/20/2020    EGFRNONAA 89 06/21/2022       Lab Results   Component Value Date    CHOL 182 04/28/2025    CHOL 148 01/02/2025    CHOL 145 11/22/2024     Lab Results   Component Value Date    HDL 69 (H) 04/28/2025    HDL 61 (H) 01/02/2025    HDL 39 11/22/2024     Lab Results   Component Value Date    LDLCALC 88 04/28/2025    LDLCALC 68 01/02/2025    LDLCALC 88 11/22/2024     Lab Results   Component Value Date    TRIG 123 04/28/2025    TRIG 96 01/02/2025    TRIG 92 11/22/2024     Lab Results   Component Value Date    CHOLHDL 2.6 04/28/2025    CHOLHDL 2.4 01/02/2025    CHOLHDL 3.7 11/22/2024       Lab Results    Component Value Date    WBC 4.82 04/28/2025    HGB 11.5 (L) 04/28/2025    HCT 35.0 (L) 04/28/2025    MCV 96.7 (H) 04/28/2025     04/28/2025           Current Outpatient Medications:     aspirin 81 MG Chew, Take 1 tablet (81 mg total) by mouth once daily., Disp: 30 tablet, Rfl: 11    atorvastatin (LIPITOR) 80 MG tablet, Take 1 tablet (80 mg total) by mouth once daily., Disp: 90 tablet, Rfl: 3    estradioL (ESTRACE) 1 MG tablet, Take 1 tablet (1 mg total) by mouth once daily., Disp: 90 tablet, Rfl: 3    levothyroxine (SYNTHROID) 100 MCG tablet, Take 100 mcg by mouth before breakfast., Disp: , Rfl:     lisinopriL (PRINIVIL,ZESTRIL) 5 MG tablet, Take 1 tablet (5 mg total) by mouth once daily., Disp: 30 tablet, Rfl: 2    metoprolol succinate (TOPROL-XL) 25 MG 24 hr tablet, Take 25 mg by mouth once daily., Disp: , Rfl:     nitroGLYCERIN (NITROSTAT) 0.4 MG SL tablet, Place 1 tablet (0.4 mg total) under the tongue every 5 (five) minutes as needed for Chest pain (for a max of 3 tabs in 15 minutes)., Disp: 25 tablet, Rfl: 4    omeprazole (PRILOSEC) 40 MG capsule, Take 40 mg by mouth 2 (two) times daily., Disp: , Rfl:     ticagrelor (BRILINTA) 90 mg tablet, Take 1 tablet (90 mg total) by mouth 2 (two) times daily., Disp: 180 tablet, Rfl: 3    folic acid (FOLVITE) 1 MG tablet, TAKE 1 TABLET BY MOUTH EVERY DAY, Disp: 90 tablet, Rfl: 1    risankizumab-rzaa 180 mg/1.2 mL (150 mg/mL) Injt, Inject 180 mg into the skin every 8 weeks. for 6 doses (Patient not taking: Reported on 1/22/2025), Disp: 1.2 mL, Rfl: 5    Review of Systems   Constitutional:  Negative for chills, diaphoresis, fever and malaise/fatigue.   Respiratory:  Positive for cough and shortness of breath.    Cardiovascular:  Negative for chest pain, palpitations, orthopnea, leg swelling and PND.   Gastrointestinal:  Negative for abdominal pain, nausea and vomiting.   Musculoskeletal:  Negative for falls.   Neurological:  Negative for focal weakness and weakness.  "        Objective:   /78   Pulse 65   Ht 5' 8" (1.727 m)   Wt 83.5 kg (184 lb)   SpO2 99%   BMI 27.98 kg/m²     Physical Exam  Constitutional:       General: She is not in acute distress.     Appearance: Normal appearance.   Cardiovascular:      Rate and Rhythm: Normal rate and regular rhythm.   Pulmonary:      Effort: Pulmonary effort is normal.      Breath sounds: Normal breath sounds.   Musculoskeletal:      Cervical back: Neck supple. No rigidity.      Right lower leg: No edema.      Left lower leg: No edema.   Skin:     General: Skin is warm and dry.   Neurological:      Mental Status: She is alert.           Assessment:     1. Sinus tachycardia  EKG 12-lead    very rare episodes of palpitations, recommend taking an extra metoprolol if palpitations last over two minutes.      2. CAD S/P percutaneous coronary angioplasty      severe single vessel CAD post PCI with ERASMO LAD      3. Primary hypertension      Well controlled on current meds      4. SOB (shortness of breath) on exertion      symptoms have resolved,              Plan:   Follow up in five  months, sooner if symptoms change.       "

## 2025-07-29 ENCOUNTER — OFFICE VISIT (OUTPATIENT)
Dept: GASTROENTEROLOGY | Facility: CLINIC | Age: 70
End: 2025-07-29
Payer: MEDICARE

## 2025-07-29 VITALS
OXYGEN SATURATION: 96 % | HEART RATE: 78 BPM | BODY MASS INDEX: 27.43 KG/M2 | DIASTOLIC BLOOD PRESSURE: 80 MMHG | HEIGHT: 68 IN | SYSTOLIC BLOOD PRESSURE: 129 MMHG | WEIGHT: 181 LBS

## 2025-07-29 DIAGNOSIS — K51.919 ULCERATIVE COLITIS WITH COMPLICATION, UNSPECIFIED LOCATION: Primary | ICD-10-CM

## 2025-07-29 DIAGNOSIS — R74.8 ELEVATED LIVER ENZYMES: ICD-10-CM

## 2025-07-29 PROCEDURE — 99214 OFFICE O/P EST MOD 30 MIN: CPT | Mod: S$PBB,,, | Performed by: NURSE PRACTITIONER

## 2025-07-29 PROCEDURE — 99214 OFFICE O/P EST MOD 30 MIN: CPT | Mod: PBBFAC | Performed by: NURSE PRACTITIONER

## 2025-07-29 PROCEDURE — 99999 PR PBB SHADOW E&M-EST. PATIENT-LVL IV: CPT | Mod: PBBFAC,,, | Performed by: NURSE PRACTITIONER

## 2025-07-29 NOTE — PROGRESS NOTES
Isabella Kelly is a 69 y.o. female here for Follow-up        PCP: Edy Villalpando  Referring Provider: Luisana Suazo, Jairo  [address] [place] [address] [place] [id number]     HPI:  Presents for follow up due to Ulcerative Colitis.  That she is doing well on Skyrizi. Colon was examined from the rectum to the cecum and no inflammation was seen.  The rectum mucosa was scarred consistent with controlled ulcerative colitis.  Diverticula were present in the descending and sigmoid colon.  Diminutive pseudopolyps were present in the descending colon.  No biopsies were taken due to Brilinta therapy.  Patient previously had moderately active to severe colitis noted in the sigmoid and rectum on colonoscopy in 2024.  The patient was previously receiving Remicade infusions.Patient has previously failed mesalamine, budesonide, zeposia, and now Entyvio. Patient had NSTEMI in November 2024. Is currently taking Brilinta. Does not take NSAID's. Reports that she does not have any diarrhea, nausea, vomiting, or rectal bleeding.  GI symptoms have improved on Skyrizi.  She did see Rheumatology due to elevated CRP with a positive SERENA and anti ds DNA.  According to note the patient was not found to have lupus.  States that the inflammation and pain that she was previously having in her hands has improved.  She does report a 10 lb weight gain.  Appetite is good.  She is taking Skyrizi injections every 8 weeks.  Labs from Dr. Jonathan Villalpando at [facility]'s reviewed, labs from 06/19/2025, liver enzymes are elevated.  , AST 81, alkaline phos 142.  Hepatitis-C is negative.  Hepatitis-B is also negative.  Does not drink alcohol.  Discussed that we will schedule a liver ultrasound for liver imaging and order some additional liver labs.  Patient does report that Lipitor was decreased.    Follow-up  Pertinent negatives include no abdominal pain, change in bowel habit, fatigue, fever, nausea or vomiting.          ROS:  Review of Systems   Constitutional:  Positive for unexpected weight change. Negative for appetite change, fatigue and fever.   HENT:  Negative for mouth sores and trouble swallowing.    Gastrointestinal:  Negative for abdominal pain, blood in stool, change in bowel habit, constipation, diarrhea, nausea, vomiting and reflux.   Musculoskeletal:  Negative for gait problem.   Integumentary:  Negative for pallor.   Psychiatric/Behavioral:  The patient is not nervous/anxious.           PMHX:  has a past medical history of Acquired hypothyroidism (01/13/2023), CAD S/P percutaneous coronary angioplasty (11/22/2024), Depressive disorder (09/2004), Esophageal reflux, Heart attack (11/22/2024), Hyperlipidemia (10/26/2011), Hypertension, IBD (inflammatory bowel disease), Iron deficiency anemia due to chronic blood loss (12/19/2022), PONV (postoperative nausea and vomiting), Post-hysterectomy menopause, Stress incontinence, Ulcerative colitis (08/2020), and Uterine prolapse.    PSHX:  has a past surgical history that includes anterior/posterior colporrhaphy with vaginal enterocele repair (06/18/2008); Appendectomy; Colonoscopy; Dilation and curettage of uterus; Hysteroscopy (11/28/2007); Endometrial biopsy (02/16/2006); ENDOMETRIAL BIOPSY REPEAT  (11/06/2007); SILVER NITRATE CAUTERY OF VAGINAL WALL (08/12/2008); SIS (11/15/2007); Total abdominal hysterectomy; TOT SLING (06/18/2008); ANGIOGRAM, CORONARY, WITH LEFT HEART CATHETERIZATION (N/A, 11/22/2024); percutaneous coronary intervention, artery (N/A, 11/22/2024); and heart stent (11/22/2024).    PFHX: Family history is unknown by patient.    PSlHX:  reports that she has never smoked. She has never used smokeless tobacco. She reports that she does not drink alcohol and does not use drugs.        Review of patient's allergies indicates:  No Known Allergies    Medication List with Changes/Refills   Current Medications    ASPIRIN 81 MG CHEW    Take 1 tablet (81 mg  "total) by mouth once daily.    ATORVASTATIN (LIPITOR) 80 MG TABLET    Take 1 tablet (80 mg total) by mouth once daily.    ESTRADIOL (ESTRACE) 1 MG TABLET    Take 1 tablet (1 mg total) by mouth once daily.    LEVOTHYROXINE (SYNTHROID) 100 MCG TABLET    Take 100 mcg by mouth before breakfast.    LISINOPRIL (PRINIVIL,ZESTRIL) 5 MG TABLET    Take 1 tablet (5 mg total) by mouth once daily.    METOPROLOL SUCCINATE (TOPROL-XL) 25 MG 24 HR TABLET    Take 25 mg by mouth once daily.    NITROGLYCERIN (NITROSTAT) 0.4 MG SL TABLET    Place 1 tablet (0.4 mg total) under the tongue every 5 (five) minutes as needed for Chest pain (for a max of 3 tabs in 15 minutes).    OMEPRAZOLE (PRILOSEC) 40 MG CAPSULE    Take 40 mg by mouth 2 (two) times daily.    RISANKIZUMAB-RZAA 180 MG/1.2 ML (150 MG/ML) INJT    Inject 180 mg into the skin every 8 weeks. for 6 doses    TICAGRELOR (BRILINTA) 90 MG TABLET    Take 1 tablet (90 mg total) by mouth 2 (two) times daily.        Objective Findings:  Vital Signs:  /80   Pulse 78   Ht 5' 8" (1.727 m)   Wt 82.1 kg (181 lb)   SpO2 96%   BMI 27.52 kg/m²  Body mass index is 27.52 kg/m².    Physical Exam:  Physical Exam  Vitals and nursing note reviewed.   Constitutional:       General: She is not in acute distress.     Appearance: Normal appearance.   HENT:      Mouth/Throat:      Mouth: Mucous membranes are moist.   Cardiovascular:      Rate and Rhythm: Normal rate.   Pulmonary:      Effort: Pulmonary effort is normal.   Abdominal:      General: Bowel sounds are normal. There is no distension.      Palpations: Abdomen is soft. There is no mass.      Tenderness: There is no abdominal tenderness.   Skin:     General: Skin is warm and dry.      Coloration: Skin is not jaundiced or pale.   Neurological:      Mental Status: She is alert and oriented to person, place, and time.   Psychiatric:         Mood and Affect: Mood normal.          Labs:  Lab Results   Component Value Date    WBC 4.82 " 04/28/2025    HGB 11.5 (L) 04/28/2025    HCT 35.0 (L) 04/28/2025    MCV 96.7 (H) 04/28/2025    RDW 12.8 04/28/2025     04/28/2025    LYMPH 32.2 04/28/2025    LYMPH 1.55 04/28/2025    MONO 10.2 (H) 04/28/2025    EOS 0.17 04/28/2025    BASO 0.01 04/28/2025     Lab Results   Component Value Date     03/25/2025    K 4.0 03/25/2025     03/25/2025    CO2 25 03/25/2025     (H) 03/25/2025    BUN 11 03/25/2025    CREATININE 0.76 03/25/2025    CALCIUM 9.4 03/25/2025    PROT 7.2 03/25/2025    ALBUMIN 3.9 03/25/2025    BILITOT 0.4 03/25/2025    ALKPHOS 94 03/25/2025    AST 29 03/25/2025    ALT 16 03/25/2025         Imaging: No results found.      Assessment:  Isabella Kelly is a 69 y.o. female here with:  1. Ulcerative colitis with complication, unspecified location    2. Elevated liver enzymes          Recommendations:  1. Liver ultrasound  2. AMA, anti-smooth muscle antibody, CRP, iron studies  3. We will continue Skyrizi injections at this time  4. May consider repeat colonoscopy in November when patient is able to hold Brilinta for biopsy    Follow up in about 3 months (around 10/29/2025).      Order summary:  Orders Placed This Encounter    US Abdomen Limited    Anti-Smooth Muscle Antibody    Antimitochondrial Antibody    Miscellaneous Test, Sendout CRP    Iron and TIBC    Ferritin       Thank you for allowing me to participate in the care of Isabella Kelly.      VANDANA Neville

## 2025-08-20 ENCOUNTER — HOSPITAL ENCOUNTER (OUTPATIENT)
Dept: RADIOLOGY | Facility: HOSPITAL | Age: 70
Discharge: HOME | End: 2025-08-20
Attending: NURSE PRACTITIONER
Payer: MEDICARE

## 2025-08-20 DIAGNOSIS — R74.8 ELEVATED LIVER ENZYMES: ICD-10-CM

## 2025-08-20 PROCEDURE — 76705 ECHO EXAM OF ABDOMEN: CPT | Mod: 26,,, | Performed by: STUDENT IN AN ORGANIZED HEALTH CARE EDUCATION/TRAINING PROGRAM

## 2025-08-20 PROCEDURE — 76705 ECHO EXAM OF ABDOMEN: CPT | Mod: TC

## (undated) DEVICE — OXISENSOR ADULT DIGIT N/S

## (undated) DEVICE — DRESSING TRANS 4X4 TEGADERM

## (undated) DEVICE — NAVIGATION KEY TOTAL KNEE FEE

## (undated) DEVICE — GLOVE SURGICAL PROTEXIS PI BLUE SIZE 7.5

## (undated) DEVICE — GUIDEWIRE OMNI J TIP 185CM

## (undated) DEVICE — SET FLD DEL LG BORE SPIK 72IN

## (undated) DEVICE — CONTRAST ISOVUE 370 100ML

## (undated) DEVICE — GLOVE SENSICARE PI SURG 8

## (undated) DEVICE — APPLICATOR CHLORAPREP HI-LITE TINTED ORANGE 26ML

## (undated) DEVICE — INTRODUCER KIT MICRO 4FR

## (undated) DEVICE — SET IV PRIMARY

## (undated) DEVICE — SET EXT IV LL MALE 2 VLVE 20IN

## (undated) DEVICE — BLADE SAGITTAL 21MM EDGE 90X1.27MM

## (undated) DEVICE — STAPLER SKIN PROXIMATE PLUS MD 35 REG DISP

## (undated) DEVICE — PROTECTOR ULNAR NERVE FOAM

## (undated) DEVICE — SOL IRRIGATION SALINE 0.9% 1000ML BOTTLE

## (undated) DEVICE — TOURNIQUET CUFF DISP QC 34 INCH

## (undated) DEVICE — BANDAGE ELASTIC 6 INCH X 5 YD STERILE

## (undated) DEVICE — WOUND DRAINAGE KIT MEDIUM 10

## (undated) DEVICE — IMMOBILIZER KNEE CUTAWAY 20IN

## (undated) DEVICE — GLOVE SURGICAL PROTEXIS PI CLASSIC SIZE 6.5

## (undated) DEVICE — SUTURE VICRYL 1 CP UD 27IN

## (undated) DEVICE — GUIDE VISTA 6FR JL 4.0

## (undated) DEVICE — Device

## (undated) DEVICE — SUTURE VICRYL 2-0 UNDYED CT-1 ANTI

## (undated) DEVICE — GLOVE SURGICAL PROTEXIS PI BLUE SIZE 8.0

## (undated) DEVICE — GLOVE SURG ULTRA TOUCH 6

## (undated) DEVICE — CLIPPER BLADE MOD 4406 (CAREF)

## (undated) DEVICE — DECANTER FLUID TRNSF WHITE 9IN

## (undated) DEVICE — GUIDEPIN 3.20MM 4 KANT SQUARE
Type: IMPLANTABLE DEVICE | Site: KNEE | Status: NON-FUNCTIONAL
Removed: 2021-08-31

## (undated) DEVICE — GLOVE SURGICAL PROTEXIS PI CLASSIC SIZE 7.5

## (undated) DEVICE — CATH DIAG IMPULSE 6FR FL4

## (undated) DEVICE — SHEATH INTRODUCER 6FR 11CM

## (undated) DEVICE — DRESSING AQUACEL A/G ADVANTAGE ANTIMICROBIAL 6 X 6IN

## (undated) DEVICE — GOWN TOGA SZ LG (DR. P)

## (undated) DEVICE — BOWL MIXER PRIZM

## (undated) DEVICE — GUIDESCREW 6 KANT HEXAGONAL 3.20MM
Type: IMPLANTABLE DEVICE | Site: KNEE | Status: NON-FUNCTIONAL
Removed: 2021-08-31

## (undated) DEVICE — KIT Y-ADAPTER W/ INSERT TORQUE

## (undated) DEVICE — CATH DIAG IMPULSE 6FR FR4

## (undated) DEVICE — DEVICE BLUE DIAMOND INFL 20ML

## (undated) DEVICE — FILM IOBAN ANTIMICROBL 35X35CM

## (undated) DEVICE — TUBING PRSS MON M/F LL 6IN

## (undated) DEVICE — THERAPY COLD UNIT COMBO SHOULDER AND KNEE

## (undated) DEVICE — ETCO2 NC MICROSTR FEM ST ADLT

## (undated) DEVICE — CDS KNEE TOTAL

## (undated) DEVICE — BATTERY NAVIGATION

## (undated) DEVICE — BLADE REAMER PATELLA SZ 42